# Patient Record
Sex: MALE | Race: WHITE | NOT HISPANIC OR LATINO | Employment: FULL TIME | URBAN - METROPOLITAN AREA
[De-identification: names, ages, dates, MRNs, and addresses within clinical notes are randomized per-mention and may not be internally consistent; named-entity substitution may affect disease eponyms.]

---

## 2017-11-18 ENCOUNTER — ANESTHESIA - HEALTHEAST (OUTPATIENT)
Dept: SURGERY | Facility: CLINIC | Age: 22
End: 2017-11-18

## 2017-11-18 ENCOUNTER — SURGERY - HEALTHEAST (OUTPATIENT)
Dept: SURGERY | Facility: CLINIC | Age: 22
End: 2017-11-18

## 2017-11-18 ASSESSMENT — MIFFLIN-ST. JEOR: SCORE: 1680.26

## 2021-05-28 ENCOUNTER — RECORDS - HEALTHEAST (OUTPATIENT)
Dept: ADMINISTRATIVE | Facility: CLINIC | Age: 26
End: 2021-05-28

## 2021-05-29 ENCOUNTER — RECORDS - HEALTHEAST (OUTPATIENT)
Dept: ADMINISTRATIVE | Facility: CLINIC | Age: 26
End: 2021-05-29

## 2021-05-30 ENCOUNTER — RECORDS - HEALTHEAST (OUTPATIENT)
Dept: ADMINISTRATIVE | Facility: CLINIC | Age: 26
End: 2021-05-30

## 2021-05-31 ENCOUNTER — RECORDS - HEALTHEAST (OUTPATIENT)
Dept: ADMINISTRATIVE | Facility: CLINIC | Age: 26
End: 2021-05-31

## 2021-05-31 VITALS — WEIGHT: 163.5 LBS | HEIGHT: 67 IN | BODY MASS INDEX: 25.66 KG/M2

## 2021-06-14 NOTE — ANESTHESIA PREPROCEDURE EVALUATION
Anesthesia Evaluation      Patient summary reviewed   No history of anesthetic complications     Airway   Mallampati: II  Neck ROM: full   Pulmonary - normal exam    breath sounds clear to auscultation  (+) a smoker                         Cardiovascular - normal exam  Exercise tolerance: > or = 4 METS  Rhythm: regular  Rate: normal,         Neuro/Psych - negative ROS     Endo/Other - negative ROS      GI/Hepatic/Renal - negative ROS           Dental    (+) poor dentition and chipped                       Anesthesia Plan  Planned anesthetic: general endotracheal  -- RSI with Succ  -- PONV prophylaxis with Decadron 10 mg and Zofran 4 mg  -- Equal volume reversal of relaxant (esmolol for iatrogenic tachycardia)    ASA 1 - emergent   Induction: intravenous   Anesthetic plan and risks discussed with: patient  Anesthesia plan special considerations: rapid sequence induction, antiemetics,   Post-op plan: routine recovery

## 2021-06-14 NOTE — ANESTHESIA CARE TRANSFER NOTE
Last vitals:   Vitals:    11/18/17 1236   BP: 166/81   Pulse: 90   Resp: 12   Temp: 36.3  C (97.3  F)   SpO2: 100%     Patient's level of consciousness is awake  Spontaneous respirations: yes  Maintains airway independently: yes  Dentition unchanged: yes  Oropharynx: oropharynx clear of all foreign objects    QCDR Measures:  ASA# 20 - Surgical Safety Checklist: WHO surgical safety checklist completed prior to induction  PQRS# 430 - Adult PONV Prevention: 4558F - Pt received => 2 anti-emetic agents (different classes) preop & intraop  ASA# 8 - Peds PONV Prevention: NA - Not pediatric patient, not GA or 2 or more risk factors NOT present  PQRS# 424 - Shira-op Temp Management: 4559F - At least one body temp DOCUMENTED => 35.5C or 95.9F within required timeframe  PQRS# 426 - PACU Transfer Protocol: - Transfer of care checklist used  ASA# 14 - Acute Post-op Pain: ASA14B - Patient did NOT experience pain >= 7 out of 10

## 2021-06-14 NOTE — ANESTHESIA POSTPROCEDURE EVALUATION
Patient: Duane Ramos  APPENDECTOMY, LAPAROSCOPIC  Anesthesia type: general    Patient location: floor  Last vitals:   Vitals:    11/18/17 1400   BP: 136/74   Pulse: 77   Resp: 16   Temp: 37  C (98.6  F)   SpO2: 97%     Post vital signs: stable  Level of consciousness: awake and responds to simple questions  Post-anesthesia pain: pain controlled  Post-anesthesia nausea and vomiting: no  Pulmonary: unassisted, return to baseline  Cardiovascular: stable and blood pressure at baseline  Hydration: adequate  Anesthetic events: no    QCDR Measures:  ASA# 11 - Shira-op Cardiac Arrest: ASA11B - Patient did NOT experience unanticipated cardiac arrest  ASA# 12 - Shira-op Mortality Rate: ASA12B - Patient did NOT die  ASA# 13 - PACU Re-Intubation Rate: ASA13B - Patient did NOT require a new airway mgmt  ASA# 10 - Composite Anes Safety: ASA10A - No serious adverse event    Additional Notes:

## 2021-06-16 PROBLEM — R10.31 RLQ ABDOMINAL PAIN: Status: ACTIVE | Noted: 2017-11-18

## 2024-02-11 ENCOUNTER — APPOINTMENT (OUTPATIENT)
Dept: CT IMAGING | Facility: CLINIC | Age: 29
DRG: 439 | End: 2024-02-11
Attending: FAMILY MEDICINE
Payer: COMMERCIAL

## 2024-02-11 ENCOUNTER — HOSPITAL ENCOUNTER (INPATIENT)
Facility: CLINIC | Age: 29
LOS: 3 days | Discharge: HOME | DRG: 439 | End: 2024-02-14
Attending: FAMILY MEDICINE | Admitting: STUDENT IN AN ORGANIZED HEALTH CARE EDUCATION/TRAINING PROGRAM
Payer: COMMERCIAL

## 2024-02-11 DIAGNOSIS — K85.20 ALCOHOL-INDUCED ACUTE PANCREATITIS WITHOUT INFECTION OR NECROSIS: ICD-10-CM

## 2024-02-11 LAB
ALBUMIN SERPL BCG-MCNC: 5.1 G/DL (ref 3.5–5.2)
ALBUMIN UR-MCNC: NEGATIVE MG/DL
ALP SERPL-CCNC: 67 U/L (ref 40–150)
ALT SERPL W P-5'-P-CCNC: 168 U/L (ref 0–70)
ANION GAP SERPL CALCULATED.3IONS-SCNC: 21 MMOL/L (ref 7–15)
APPEARANCE UR: CLEAR
AST SERPL W P-5'-P-CCNC: 85 U/L (ref 0–45)
BASOPHILS # BLD AUTO: 0.1 10E3/UL (ref 0–0.2)
BASOPHILS NFR BLD AUTO: 0 %
BILIRUB SERPL-MCNC: 0.3 MG/DL
BILIRUB UR QL STRIP: NEGATIVE
BUN SERPL-MCNC: 17.5 MG/DL (ref 6–20)
CALCIUM SERPL-MCNC: 9.6 MG/DL (ref 8.6–10)
CHLORIDE SERPL-SCNC: 101 MMOL/L (ref 98–107)
COLOR UR AUTO: YELLOW
CREAT SERPL-MCNC: 1.06 MG/DL (ref 0.67–1.17)
DEPRECATED HCO3 PLAS-SCNC: 22 MMOL/L (ref 22–29)
EGFRCR SERPLBLD CKD-EPI 2021: >90 ML/MIN/1.73M2
EOSINOPHIL # BLD AUTO: 0.1 10E3/UL (ref 0–0.7)
EOSINOPHIL NFR BLD AUTO: 0 %
ERYTHROCYTE [DISTWIDTH] IN BLOOD BY AUTOMATED COUNT: 11.9 % (ref 10–15)
ETHANOL SERPL-MCNC: 0.1 G/DL
GLUCOSE SERPL-MCNC: 145 MG/DL (ref 70–99)
GLUCOSE UR STRIP-MCNC: NEGATIVE MG/DL
HCT VFR BLD AUTO: 48.9 % (ref 40–53)
HGB BLD-MCNC: 16.7 G/DL (ref 13.3–17.7)
HGB UR QL STRIP: NEGATIVE
HOLD SPECIMEN: NORMAL
IMM GRANULOCYTES # BLD: 0.2 10E3/UL
IMM GRANULOCYTES NFR BLD: 1 %
KETONES UR STRIP-MCNC: 5 MG/DL
LEUKOCYTE ESTERASE UR QL STRIP: NEGATIVE
LIPASE SERPL-CCNC: >3000 U/L (ref 13–60)
LYMPHOCYTES # BLD AUTO: 4.9 10E3/UL (ref 0.8–5.3)
LYMPHOCYTES NFR BLD AUTO: 20 %
MAGNESIUM SERPL-MCNC: 2.2 MG/DL (ref 1.7–2.3)
MCH RBC QN AUTO: 30.1 PG (ref 26.5–33)
MCHC RBC AUTO-ENTMCNC: 34.2 G/DL (ref 31.5–36.5)
MCV RBC AUTO: 88 FL (ref 78–100)
MONOCYTES # BLD AUTO: 1.2 10E3/UL (ref 0–1.3)
MONOCYTES NFR BLD AUTO: 5 %
NEUTROPHILS # BLD AUTO: 18.5 10E3/UL (ref 1.6–8.3)
NEUTROPHILS NFR BLD AUTO: 74 %
NITRATE UR QL: NEGATIVE
NRBC # BLD AUTO: 0 10E3/UL
NRBC BLD AUTO-RTO: 0 /100
PH UR STRIP: 5 [PH] (ref 5–7)
PHOSPHATE SERPL-MCNC: 4.3 MG/DL (ref 2.5–4.5)
PLATELET # BLD AUTO: 310 10E3/UL (ref 150–450)
POTASSIUM SERPL-SCNC: 3.6 MMOL/L (ref 3.4–5.3)
PROT SERPL-MCNC: 8.6 G/DL (ref 6.4–8.3)
RBC # BLD AUTO: 5.54 10E6/UL (ref 4.4–5.9)
SODIUM SERPL-SCNC: 144 MMOL/L (ref 135–145)
SP GR UR STRIP: 1.01 (ref 1–1.03)
TROPONIN T SERPL HS-MCNC: <6 NG/L
UROBILINOGEN UR STRIP-MCNC: NORMAL MG/DL
WBC # BLD AUTO: 24.9 10E3/UL (ref 4–11)

## 2024-02-11 PROCEDURE — 99221 1ST HOSP IP/OBS SF/LOW 40: CPT

## 2024-02-11 PROCEDURE — 250N000011 HC RX IP 250 OP 636

## 2024-02-11 PROCEDURE — 96375 TX/PRO/DX INJ NEW DRUG ADDON: CPT | Performed by: FAMILY MEDICINE

## 2024-02-11 PROCEDURE — C9113 INJ PANTOPRAZOLE SODIUM, VIA: HCPCS | Performed by: FAMILY MEDICINE

## 2024-02-11 PROCEDURE — 84100 ASSAY OF PHOSPHORUS: CPT

## 2024-02-11 PROCEDURE — 83735 ASSAY OF MAGNESIUM: CPT

## 2024-02-11 PROCEDURE — 250N000013 HC RX MED GY IP 250 OP 250 PS 637

## 2024-02-11 PROCEDURE — 83690 ASSAY OF LIPASE: CPT | Performed by: FAMILY MEDICINE

## 2024-02-11 PROCEDURE — 81003 URINALYSIS AUTO W/O SCOPE: CPT | Performed by: FAMILY MEDICINE

## 2024-02-11 PROCEDURE — 84484 ASSAY OF TROPONIN QUANT: CPT | Performed by: FAMILY MEDICINE

## 2024-02-11 PROCEDURE — 99285 EMERGENCY DEPT VISIT HI MDM: CPT | Mod: 25 | Performed by: FAMILY MEDICINE

## 2024-02-11 PROCEDURE — 96361 HYDRATE IV INFUSION ADD-ON: CPT | Performed by: FAMILY MEDICINE

## 2024-02-11 PROCEDURE — 258N000003 HC RX IP 258 OP 636: Performed by: FAMILY MEDICINE

## 2024-02-11 PROCEDURE — 96374 THER/PROPH/DIAG INJ IV PUSH: CPT | Mod: 59 | Performed by: FAMILY MEDICINE

## 2024-02-11 PROCEDURE — 250N000009 HC RX 250: Performed by: FAMILY MEDICINE

## 2024-02-11 PROCEDURE — 93010 ELECTROCARDIOGRAM REPORT: CPT | Performed by: FAMILY MEDICINE

## 2024-02-11 PROCEDURE — 250N000011 HC RX IP 250 OP 636: Performed by: FAMILY MEDICINE

## 2024-02-11 PROCEDURE — 85004 AUTOMATED DIFF WBC COUNT: CPT | Performed by: EMERGENCY MEDICINE

## 2024-02-11 PROCEDURE — 258N000003 HC RX IP 258 OP 636

## 2024-02-11 PROCEDURE — 85025 COMPLETE CBC W/AUTO DIFF WBC: CPT | Performed by: FAMILY MEDICINE

## 2024-02-11 PROCEDURE — 250N000009 HC RX 250

## 2024-02-11 PROCEDURE — 250N000011 HC RX IP 250 OP 636: Performed by: EMERGENCY MEDICINE

## 2024-02-11 PROCEDURE — 120N000001 HC R&B MED SURG/OB

## 2024-02-11 PROCEDURE — 80053 COMPREHEN METABOLIC PANEL: CPT | Performed by: FAMILY MEDICINE

## 2024-02-11 PROCEDURE — 36415 COLL VENOUS BLD VENIPUNCTURE: CPT | Performed by: EMERGENCY MEDICINE

## 2024-02-11 PROCEDURE — 96376 TX/PRO/DX INJ SAME DRUG ADON: CPT | Performed by: FAMILY MEDICINE

## 2024-02-11 PROCEDURE — 82077 ASSAY SPEC XCP UR&BREATH IA: CPT | Performed by: FAMILY MEDICINE

## 2024-02-11 PROCEDURE — 74177 CT ABD & PELVIS W/CONTRAST: CPT

## 2024-02-11 RX ORDER — HALOPERIDOL 5 MG/ML
2.5-5 INJECTION INTRAMUSCULAR EVERY 6 HOURS PRN
Status: DISCONTINUED | OUTPATIENT
Start: 2024-02-11 | End: 2024-02-14 | Stop reason: HOSPADM

## 2024-02-11 RX ORDER — OXYCODONE HYDROCHLORIDE 5 MG/1
5 TABLET ORAL EVERY 4 HOURS PRN
Status: DISCONTINUED | OUTPATIENT
Start: 2024-02-11 | End: 2024-02-14 | Stop reason: HOSPADM

## 2024-02-11 RX ORDER — DIAZEPAM 5 MG
10 TABLET ORAL EVERY 30 MIN PRN
Status: DISCONTINUED | OUTPATIENT
Start: 2024-02-11 | End: 2024-02-14 | Stop reason: HOSPADM

## 2024-02-11 RX ORDER — NALOXONE HYDROCHLORIDE 0.4 MG/ML
0.4 INJECTION, SOLUTION INTRAMUSCULAR; INTRAVENOUS; SUBCUTANEOUS
Status: DISCONTINUED | OUTPATIENT
Start: 2024-02-11 | End: 2024-02-14 | Stop reason: HOSPADM

## 2024-02-11 RX ORDER — GABAPENTIN 300 MG/1
900 CAPSULE ORAL EVERY 8 HOURS
Qty: 27 CAPSULE | Refills: 0 | Status: DISCONTINUED | OUTPATIENT
Start: 2024-02-12 | End: 2024-02-14 | Stop reason: HOSPADM

## 2024-02-11 RX ORDER — ONDANSETRON 2 MG/ML
4 INJECTION INTRAMUSCULAR; INTRAVENOUS EVERY 6 HOURS PRN
Status: DISCONTINUED | OUTPATIENT
Start: 2024-02-11 | End: 2024-02-14 | Stop reason: HOSPADM

## 2024-02-11 RX ORDER — ONDANSETRON 4 MG/1
4 TABLET, ORALLY DISINTEGRATING ORAL EVERY 6 HOURS PRN
Status: DISCONTINUED | OUTPATIENT
Start: 2024-02-11 | End: 2024-02-14 | Stop reason: HOSPADM

## 2024-02-11 RX ORDER — ONDANSETRON 2 MG/ML
4 INJECTION INTRAMUSCULAR; INTRAVENOUS EVERY 6 HOURS PRN
Status: DISCONTINUED | OUTPATIENT
Start: 2024-02-11 | End: 2024-02-11

## 2024-02-11 RX ORDER — FOLIC ACID 1 MG/1
1 TABLET ORAL DAILY
Status: DISCONTINUED | OUTPATIENT
Start: 2024-02-12 | End: 2024-02-14 | Stop reason: HOSPADM

## 2024-02-11 RX ORDER — HYDROMORPHONE HYDROCHLORIDE 1 MG/ML
.3-.5 INJECTION, SOLUTION INTRAMUSCULAR; INTRAVENOUS; SUBCUTANEOUS
Status: DISCONTINUED | OUTPATIENT
Start: 2024-02-11 | End: 2024-02-11

## 2024-02-11 RX ORDER — HYDROMORPHONE HYDROCHLORIDE 1 MG/ML
0.5 INJECTION, SOLUTION INTRAMUSCULAR; INTRAVENOUS; SUBCUTANEOUS
Status: COMPLETED | OUTPATIENT
Start: 2024-02-11 | End: 2024-02-11

## 2024-02-11 RX ORDER — SODIUM CHLORIDE 9 MG/ML
INJECTION, SOLUTION INTRAVENOUS CONTINUOUS
Status: DISCONTINUED | OUTPATIENT
Start: 2024-02-11 | End: 2024-02-11 | Stop reason: ALTCHOICE

## 2024-02-11 RX ORDER — PROCHLORPERAZINE 25 MG
25 SUPPOSITORY, RECTAL RECTAL EVERY 12 HOURS PRN
Status: DISCONTINUED | OUTPATIENT
Start: 2024-02-11 | End: 2024-02-14 | Stop reason: HOSPADM

## 2024-02-11 RX ORDER — CALCIUM CARBONATE 500 MG/1
1000 TABLET, CHEWABLE ORAL 4 TIMES DAILY PRN
Status: DISCONTINUED | OUTPATIENT
Start: 2024-02-11 | End: 2024-02-11

## 2024-02-11 RX ORDER — ACETAMINOPHEN 325 MG/1
650 TABLET ORAL EVERY 4 HOURS PRN
Status: DISCONTINUED | OUTPATIENT
Start: 2024-02-11 | End: 2024-02-14 | Stop reason: HOSPADM

## 2024-02-11 RX ORDER — HYDROMORPHONE HYDROCHLORIDE 1 MG/ML
0.3 INJECTION, SOLUTION INTRAMUSCULAR; INTRAVENOUS; SUBCUTANEOUS
Status: DISCONTINUED | OUTPATIENT
Start: 2024-02-11 | End: 2024-02-14 | Stop reason: HOSPADM

## 2024-02-11 RX ORDER — MULTIPLE VITAMINS W/ MINERALS TAB 9MG-400MCG
1 TAB ORAL DAILY
Status: DISCONTINUED | OUTPATIENT
Start: 2024-02-12 | End: 2024-02-14 | Stop reason: HOSPADM

## 2024-02-11 RX ORDER — AMOXICILLIN 250 MG
1 CAPSULE ORAL 2 TIMES DAILY PRN
Status: DISCONTINUED | OUTPATIENT
Start: 2024-02-11 | End: 2024-02-13

## 2024-02-11 RX ORDER — AMOXICILLIN 250 MG
2 CAPSULE ORAL 2 TIMES DAILY PRN
Status: DISCONTINUED | OUTPATIENT
Start: 2024-02-11 | End: 2024-02-13

## 2024-02-11 RX ORDER — OLANZAPINE 5 MG/1
5-10 TABLET, ORALLY DISINTEGRATING ORAL EVERY 6 HOURS PRN
Status: DISCONTINUED | OUTPATIENT
Start: 2024-02-11 | End: 2024-02-14 | Stop reason: HOSPADM

## 2024-02-11 RX ORDER — CLONIDINE HYDROCHLORIDE 0.1 MG/1
0.1 TABLET ORAL EVERY 8 HOURS
Status: DISCONTINUED | OUTPATIENT
Start: 2024-02-11 | End: 2024-02-14 | Stop reason: HOSPADM

## 2024-02-11 RX ORDER — LIDOCAINE 40 MG/G
CREAM TOPICAL
Status: DISCONTINUED | OUTPATIENT
Start: 2024-02-11 | End: 2024-02-14 | Stop reason: HOSPADM

## 2024-02-11 RX ORDER — DIAZEPAM 10 MG/2ML
5-10 INJECTION, SOLUTION INTRAMUSCULAR; INTRAVENOUS EVERY 30 MIN PRN
Status: DISCONTINUED | OUTPATIENT
Start: 2024-02-11 | End: 2024-02-14 | Stop reason: HOSPADM

## 2024-02-11 RX ORDER — NALOXONE HYDROCHLORIDE 0.4 MG/ML
0.2 INJECTION, SOLUTION INTRAMUSCULAR; INTRAVENOUS; SUBCUTANEOUS
Status: DISCONTINUED | OUTPATIENT
Start: 2024-02-11 | End: 2024-02-14 | Stop reason: HOSPADM

## 2024-02-11 RX ORDER — GABAPENTIN 100 MG/1
100 CAPSULE ORAL EVERY 8 HOURS
Qty: 9 CAPSULE | Refills: 0 | Status: DISCONTINUED | OUTPATIENT
Start: 2024-02-19 | End: 2024-02-14 | Stop reason: HOSPADM

## 2024-02-11 RX ORDER — GABAPENTIN 300 MG/1
600 CAPSULE ORAL EVERY 8 HOURS
Qty: 12 CAPSULE | Refills: 0 | Status: DISCONTINUED | OUTPATIENT
Start: 2024-02-15 | End: 2024-02-14 | Stop reason: HOSPADM

## 2024-02-11 RX ORDER — IOPAMIDOL 755 MG/ML
97 INJECTION, SOLUTION INTRAVASCULAR ONCE
Status: COMPLETED | OUTPATIENT
Start: 2024-02-11 | End: 2024-02-11

## 2024-02-11 RX ORDER — GABAPENTIN 600 MG/1
1200 TABLET ORAL ONCE
Status: COMPLETED | OUTPATIENT
Start: 2024-02-11 | End: 2024-02-11

## 2024-02-11 RX ORDER — ONDANSETRON 4 MG/1
4 TABLET, ORALLY DISINTEGRATING ORAL EVERY 6 HOURS PRN
Status: DISCONTINUED | OUTPATIENT
Start: 2024-02-11 | End: 2024-02-11

## 2024-02-11 RX ORDER — SODIUM CHLORIDE, SODIUM LACTATE, POTASSIUM CHLORIDE, CALCIUM CHLORIDE 600; 310; 30; 20 MG/100ML; MG/100ML; MG/100ML; MG/100ML
INJECTION, SOLUTION INTRAVENOUS CONTINUOUS
Status: DISCONTINUED | OUTPATIENT
Start: 2024-02-11 | End: 2024-02-14 | Stop reason: HOSPADM

## 2024-02-11 RX ORDER — GABAPENTIN 300 MG/1
300 CAPSULE ORAL EVERY 8 HOURS
Qty: 6 CAPSULE | Refills: 0 | Status: DISCONTINUED | OUTPATIENT
Start: 2024-02-17 | End: 2024-02-14 | Stop reason: HOSPADM

## 2024-02-11 RX ORDER — FLUMAZENIL 0.1 MG/ML
0.2 INJECTION, SOLUTION INTRAVENOUS
Status: DISCONTINUED | OUTPATIENT
Start: 2024-02-11 | End: 2024-02-14 | Stop reason: HOSPADM

## 2024-02-11 RX ORDER — OXYCODONE HYDROCHLORIDE 5 MG/1
5 TABLET ORAL EVERY 4 HOURS PRN
Status: DISCONTINUED | OUTPATIENT
Start: 2024-02-11 | End: 2024-02-11

## 2024-02-11 RX ORDER — PROCHLORPERAZINE MALEATE 10 MG
10 TABLET ORAL EVERY 6 HOURS PRN
Status: DISCONTINUED | OUTPATIENT
Start: 2024-02-11 | End: 2024-02-14 | Stop reason: HOSPADM

## 2024-02-11 RX ORDER — CALCIUM CARBONATE 500 MG/1
1000 TABLET, CHEWABLE ORAL 4 TIMES DAILY PRN
Status: DISCONTINUED | OUTPATIENT
Start: 2024-02-11 | End: 2024-02-14 | Stop reason: HOSPADM

## 2024-02-11 RX ORDER — ONDANSETRON 2 MG/ML
4 INJECTION INTRAMUSCULAR; INTRAVENOUS ONCE
Status: COMPLETED | OUTPATIENT
Start: 2024-02-11 | End: 2024-02-11

## 2024-02-11 RX ADMIN — IOPAMIDOL 97 ML: 755 INJECTION, SOLUTION INTRAVENOUS at 19:11

## 2024-02-11 RX ADMIN — HYDROMORPHONE HYDROCHLORIDE 0.3 MG: 1 INJECTION, SOLUTION INTRAMUSCULAR; INTRAVENOUS; SUBCUTANEOUS at 23:25

## 2024-02-11 RX ADMIN — GABAPENTIN 1200 MG: 600 TABLET, FILM COATED ORAL at 22:32

## 2024-02-11 RX ADMIN — HYDROMORPHONE HYDROCHLORIDE 0.5 MG: 1 INJECTION, SOLUTION INTRAMUSCULAR; INTRAVENOUS; SUBCUTANEOUS at 20:20

## 2024-02-11 RX ADMIN — ONDANSETRON 4 MG: 2 INJECTION INTRAMUSCULAR; INTRAVENOUS at 18:15

## 2024-02-11 RX ADMIN — HYDROMORPHONE HYDROCHLORIDE 0.5 MG: 1 INJECTION, SOLUTION INTRAMUSCULAR; INTRAVENOUS; SUBCUTANEOUS at 21:01

## 2024-02-11 RX ADMIN — SODIUM CHLORIDE 64 ML: 9 INJECTION, SOLUTION INTRAVENOUS at 19:11

## 2024-02-11 RX ADMIN — PANTOPRAZOLE SODIUM 40 MG: 40 INJECTION, POWDER, FOR SOLUTION INTRAVENOUS at 19:02

## 2024-02-11 RX ADMIN — ASCORBIC ACID, VITAMIN A PALMITATE, CHOLECALCIFEROL, THIAMINE HYDROCHLORIDE, RIBOFLAVIN-5 PHOSPHATE SODIUM, PYRIDOXINE HYDROCHLORIDE, NIACINAMIDE, DEXPANTHENOL, ALPHA-TOCOPHEROL ACETATE, VITAMIN K1, FOLIC ACID, BIOTIN, CYANOCOBALAMIN: 200; 3300; 200; 6; 3.6; 6; 40; 15; 10; 150; 600; 60; 5 INJECTION, SOLUTION INTRAVENOUS at 23:15

## 2024-02-11 RX ADMIN — CLONIDINE HYDROCHLORIDE 0.1 MG: 0.1 TABLET ORAL at 22:31

## 2024-02-11 RX ADMIN — ACETAMINOPHEN 650 MG: 325 TABLET, FILM COATED ORAL at 22:32

## 2024-02-11 RX ADMIN — SODIUM CHLORIDE, POTASSIUM CHLORIDE, SODIUM LACTATE AND CALCIUM CHLORIDE: 600; 310; 30; 20 INJECTION, SOLUTION INTRAVENOUS at 22:34

## 2024-02-11 RX ADMIN — OXYCODONE HYDROCHLORIDE 2.5 MG: 5 TABLET ORAL at 22:32

## 2024-02-11 RX ADMIN — ONDANSETRON 4 MG: 4 TABLET, ORALLY DISINTEGRATING ORAL at 22:31

## 2024-02-11 RX ADMIN — HYDROMORPHONE HYDROCHLORIDE 0.5 MG: 1 INJECTION, SOLUTION INTRAMUSCULAR; INTRAVENOUS; SUBCUTANEOUS at 19:02

## 2024-02-11 RX ADMIN — SODIUM CHLORIDE 1000 ML: 9 INJECTION, SOLUTION INTRAVENOUS at 19:37

## 2024-02-11 ASSESSMENT — ACTIVITIES OF DAILY LIVING (ADL)
ADLS_ACUITY_SCORE: 18
ADLS_ACUITY_SCORE: 35
ADLS_ACUITY_SCORE: 35

## 2024-02-11 NOTE — LETTER
[facility] SURGICAL  [address] [address]  [phone]      2024    Duane Ramos  [address]  [facility] [place] [address]  [phone] (home)     : 1995      To Whom it may concern:    Duane Ramos was admitted to [facility] on 2024 for a serious medical condition.  He was discharged to home with recovery instructions on 2024.  He may return to work without restriction on 2024.      Sincerely,        Leo Melendez MD

## 2024-02-11 NOTE — LETTER
Communication to Referring Provider                    Duane Ramos MRN# [id number]   YOB: 1995 Age: 28 year old     Date of Admission:  ***  Date of Discharge:  {DISCHARGE DATE:119492}  Admitting Physician:  Anatoly Malin MD  Discharging Physician: {:[id number]} (Contact: ***)  Discharging Service:  {:[id number]}  Hospitalization Status: {:[id number]}     Primary Care Clinic:  {:[id number]}  Primary Care Provider: No Ref-Primary, Physician     {   Salutation            :[id number]}            You have been identified as the Primary Care Provider for Duane Ramos, who was recently admitted to {[facility]:[id number]}.  Thank you for the referral to our hospital.  It is our goal to provide the highest quality of care for our patients, including planning for seamless continuity of care by providing you with timely, accurate and concise information.  After reviewing the following combined discharge summary and final progress note, please contact us if you have any remaining questions.  The Discharging Physician will be the best informed, with their contact information listed above.  If unable to reach them, or if you have received this letter in error, please call *** and someone will try to help you.

## 2024-02-12 LAB
ALBUMIN SERPL BCG-MCNC: 4.6 G/DL (ref 3.5–5.2)
ALP SERPL-CCNC: 55 U/L (ref 40–150)
ALT SERPL W P-5'-P-CCNC: 123 U/L (ref 0–70)
ANION GAP SERPL CALCULATED.3IONS-SCNC: 18 MMOL/L (ref 7–15)
AST SERPL W P-5'-P-CCNC: 56 U/L (ref 0–45)
BASOPHILS # BLD AUTO: 0 10E3/UL (ref 0–0.2)
BASOPHILS NFR BLD AUTO: 0 %
BILIRUB SERPL-MCNC: 0.6 MG/DL
BUN SERPL-MCNC: 19.4 MG/DL (ref 6–20)
CALCIUM SERPL-MCNC: 8.7 MG/DL (ref 8.6–10)
CHLORIDE SERPL-SCNC: 99 MMOL/L (ref 98–107)
CREAT SERPL-MCNC: 0.89 MG/DL (ref 0.67–1.17)
DEPRECATED HCO3 PLAS-SCNC: 21 MMOL/L (ref 22–29)
EGFRCR SERPLBLD CKD-EPI 2021: >90 ML/MIN/1.73M2
EOSINOPHIL # BLD AUTO: 0 10E3/UL (ref 0–0.7)
EOSINOPHIL NFR BLD AUTO: 0 %
ERYTHROCYTE [DISTWIDTH] IN BLOOD BY AUTOMATED COUNT: 11.9 % (ref 10–15)
GLUCOSE SERPL-MCNC: 200 MG/DL (ref 70–99)
HCT VFR BLD AUTO: 48.6 % (ref 40–53)
HGB BLD-MCNC: 16.7 G/DL (ref 13.3–17.7)
IMM GRANULOCYTES # BLD: 0.2 10E3/UL
IMM GRANULOCYTES NFR BLD: 1 %
LYMPHOCYTES # BLD AUTO: 0.9 10E3/UL (ref 0.8–5.3)
LYMPHOCYTES NFR BLD AUTO: 5 %
MCH RBC QN AUTO: 30.1 PG (ref 26.5–33)
MCHC RBC AUTO-ENTMCNC: 34.4 G/DL (ref 31.5–36.5)
MCV RBC AUTO: 88 FL (ref 78–100)
MONOCYTES # BLD AUTO: 1 10E3/UL (ref 0–1.3)
MONOCYTES NFR BLD AUTO: 6 %
NEUTROPHILS # BLD AUTO: 15.7 10E3/UL (ref 1.6–8.3)
NEUTROPHILS NFR BLD AUTO: 88 %
NRBC # BLD AUTO: 0 10E3/UL
NRBC BLD AUTO-RTO: 0 /100
PLATELET # BLD AUTO: 253 10E3/UL (ref 150–450)
POTASSIUM SERPL-SCNC: 4.8 MMOL/L (ref 3.4–5.3)
PROT SERPL-MCNC: 7.5 G/DL (ref 6.4–8.3)
RBC # BLD AUTO: 5.54 10E6/UL (ref 4.4–5.9)
SODIUM SERPL-SCNC: 138 MMOL/L (ref 135–145)
WBC # BLD AUTO: 17.8 10E3/UL (ref 4–11)

## 2024-02-12 PROCEDURE — 80053 COMPREHEN METABOLIC PANEL: CPT

## 2024-02-12 PROCEDURE — 250N000011 HC RX IP 250 OP 636

## 2024-02-12 PROCEDURE — 258N000003 HC RX IP 258 OP 636

## 2024-02-12 PROCEDURE — 250N000011 HC RX IP 250 OP 636: Performed by: FAMILY MEDICINE

## 2024-02-12 PROCEDURE — 250N000013 HC RX MED GY IP 250 OP 250 PS 637

## 2024-02-12 PROCEDURE — 120N000001 HC R&B MED SURG/OB

## 2024-02-12 PROCEDURE — 250N000013 HC RX MED GY IP 250 OP 250 PS 637: Performed by: INTERNAL MEDICINE

## 2024-02-12 PROCEDURE — 99232 SBSQ HOSP IP/OBS MODERATE 35: CPT | Performed by: INTERNAL MEDICINE

## 2024-02-12 PROCEDURE — 36415 COLL VENOUS BLD VENIPUNCTURE: CPT

## 2024-02-12 PROCEDURE — 85025 COMPLETE CBC W/AUTO DIFF WBC: CPT

## 2024-02-12 RX ORDER — BISACODYL 5 MG
10 TABLET, DELAYED RELEASE (ENTERIC COATED) ORAL ONCE
Status: COMPLETED | OUTPATIENT
Start: 2024-02-12 | End: 2024-02-12

## 2024-02-12 RX ADMIN — OXYCODONE HYDROCHLORIDE 5 MG: 5 TABLET ORAL at 22:20

## 2024-02-12 RX ADMIN — CLONIDINE HYDROCHLORIDE 0.1 MG: 0.1 TABLET ORAL at 05:40

## 2024-02-12 RX ADMIN — SODIUM CHLORIDE, POTASSIUM CHLORIDE, SODIUM LACTATE AND CALCIUM CHLORIDE: 600; 310; 30; 20 INJECTION, SOLUTION INTRAVENOUS at 22:22

## 2024-02-12 RX ADMIN — SODIUM CHLORIDE, POTASSIUM CHLORIDE, SODIUM LACTATE AND CALCIUM CHLORIDE: 600; 310; 30; 20 INJECTION, SOLUTION INTRAVENOUS at 09:31

## 2024-02-12 RX ADMIN — HYDROMORPHONE HYDROCHLORIDE 0.3 MG: 1 INJECTION, SOLUTION INTRAMUSCULAR; INTRAVENOUS; SUBCUTANEOUS at 04:21

## 2024-02-12 RX ADMIN — THIAMINE HCL TAB 100 MG 100 MG: 100 TAB at 07:55

## 2024-02-12 RX ADMIN — BISACODYL 10 MG: 5 TABLET, COATED ORAL at 13:19

## 2024-02-12 RX ADMIN — CLONIDINE HYDROCHLORIDE 0.1 MG: 0.1 TABLET ORAL at 15:01

## 2024-02-12 RX ADMIN — GABAPENTIN 900 MG: 300 CAPSULE ORAL at 15:00

## 2024-02-12 RX ADMIN — HYDROMORPHONE HYDROCHLORIDE 0.3 MG: 1 INJECTION, SOLUTION INTRAMUSCULAR; INTRAVENOUS; SUBCUTANEOUS at 01:28

## 2024-02-12 RX ADMIN — SODIUM CHLORIDE, POTASSIUM CHLORIDE, SODIUM LACTATE AND CALCIUM CHLORIDE: 600; 310; 30; 20 INJECTION, SOLUTION INTRAVENOUS at 16:23

## 2024-02-12 RX ADMIN — ACETAMINOPHEN 650 MG: 325 TABLET, FILM COATED ORAL at 07:54

## 2024-02-12 RX ADMIN — FOLIC ACID 1 MG: 1 TABLET ORAL at 07:55

## 2024-02-12 RX ADMIN — GABAPENTIN 900 MG: 300 CAPSULE ORAL at 05:41

## 2024-02-12 RX ADMIN — OXYCODONE HYDROCHLORIDE 5 MG: 5 TABLET ORAL at 17:28

## 2024-02-12 RX ADMIN — HYDROMORPHONE HYDROCHLORIDE 0.3 MG: 1 INJECTION, SOLUTION INTRAMUSCULAR; INTRAVENOUS; SUBCUTANEOUS at 06:29

## 2024-02-12 RX ADMIN — GABAPENTIN 900 MG: 300 CAPSULE ORAL at 22:12

## 2024-02-12 RX ADMIN — OXYCODONE HYDROCHLORIDE 5 MG: 5 TABLET ORAL at 09:30

## 2024-02-12 RX ADMIN — ONDANSETRON 4 MG: 2 INJECTION INTRAMUSCULAR; INTRAVENOUS at 06:22

## 2024-02-12 RX ADMIN — SENNOSIDES AND DOCUSATE SODIUM 2 TABLET: 8.6; 5 TABLET ORAL at 11:54

## 2024-02-12 RX ADMIN — SENNOSIDES AND DOCUSATE SODIUM 1 TABLET: 8.6; 5 TABLET ORAL at 22:13

## 2024-02-12 RX ADMIN — CLONIDINE HYDROCHLORIDE 0.1 MG: 0.1 TABLET ORAL at 22:13

## 2024-02-12 RX ADMIN — ACETAMINOPHEN 650 MG: 325 TABLET, FILM COATED ORAL at 15:01

## 2024-02-12 RX ADMIN — CALCIUM CARBONATE (ANTACID) CHEW TAB 500 MG 1000 MG: 500 CHEW TAB at 22:20

## 2024-02-12 RX ADMIN — Medication 1 TABLET: at 07:55

## 2024-02-12 ASSESSMENT — ACTIVITIES OF DAILY LIVING (ADL)
ADLS_ACUITY_SCORE: 18

## 2024-02-12 NOTE — ED TRIAGE NOTES
Pt had sudden onset of abd pain this evening.  Pt states that it feels different than the 24 hr bug he had 2 weeks ago.  Sharp pain in upper abd.  RN notes patient to be pale in triage.  Pain 10/10.  Nausea and vomiting.      RN line and lab in triage while awaiting a room for patient.  Zofran administered for comfort.      Triage Assessment (Adult)       Row Name 02/11/24 1800          Triage Assessment    Airway WDL WDL        Respiratory WDL    Respiratory WDL WDL        Skin Circulation/Temperature WDL    Skin Circulation/Temperature WDL temperature;all     Skin Temperature cool        Cardiac WDL    Cardiac WDL WDL        Peripheral/Neurovascular WDL    Peripheral Neurovascular WDL WDL        Cognitive/Neuro/Behavioral WDL    Cognitive/Neuro/Behavioral WDL WDL

## 2024-02-12 NOTE — ED PROVIDER NOTES
History     Chief Complaint   Patient presents with    Abdominal Pain     Pt had sudden onset of abd pain this evening.  Pt states that it feels different than the 24 hr bug he had 2 weeks ago.  Sharp pain in upper abd.  RN notes patient to be pale in triage.  Pain 10/10.  Nausea and vomiting.       HPI  Duane Ramos is a 28 year old male, past medical history significant for daily alcohol use, presents to the emergency department accompanied by significant other with onset of severe mid abdominal pain around 3:00 this afternoon.  History is obtained from both the patient and his significant other.  She states that they were up late and drinking last night and so he slept until about noon.  Did not eat anything since last night but probably consumed a significant quantity of alcohol which he does on a regular basis.  The pain is sharp and severe central, worse all over when he moves.  He has not tried anything for pain.  He has never had pain like this before.  He is status post appendectomy.  He denies any other Co. Intoxicants.  He notes no fever chills or sweats.  Last normal bowel movement was yesterday, usually has a bowel movement by this time today but has not.  He is nauseated and had 4 episodes of nonblack/bloody or coffee-ground appearing emesis at home.  No history for black or tarry appearing stools.      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    Past Surgical History:    Past Surgical History:   Procedure Laterality Date    OTHER SURGICAL HISTORY Right 2008    broken clavicle    MD LAP,APPENDECTOMY N/A 11/18/2017    Procedure: APPENDECTOMY, LAPAROSCOPIC;  Surgeon: Milton Francois MD;  Location: [facility] OR;   "Service: General       Family History:    No family history on file.    Social History:  Marital Status:  Single [1]  Social History     Tobacco Use    Smoking status: Every Day    Smokeless tobacco: Never   Substance Use Topics    Alcohol use: Yes    Drug use: No        Medications:    No current outpatient medications on file.        Review of Systems   All other systems reviewed and are negative.      Physical Exam   BP: 124/68  Pulse: 80  Resp: 16  Height: 170.2 cm (5' 7\")  Weight: 90.7 kg (200 lb)  SpO2: 95 %      Physical Exam  Vitals and nursing note reviewed.   Constitutional:       General: He is in acute distress.      Appearance: He is well-developed and normal weight. He is ill-appearing.      Comments: Pale, diaphoretic, appears very uncomfortable due to pain   HENT:      Head: Normocephalic and atraumatic.      Mouth/Throat:      Mouth: Mucous membranes are moist.      Pharynx: Oropharynx is clear.   Eyes:      Extraocular Movements: Extraocular movements intact.      Pupils: Pupils are equal, round, and reactive to light.   Cardiovascular:      Rate and Rhythm: Normal rate and regular rhythm.      Heart sounds: Normal heart sounds.   Pulmonary:      Effort: Pulmonary effort is normal.      Breath sounds: Normal breath sounds.   Abdominal:      Comments: Obese abdomen, diminished bowel sounds, tender left upper quadrant and epigastric area primarily but guarding throughout the abdomen and also tender bilateral CVAs.  Rebound and referred pain are present.   Skin:     General: Skin is warm and dry.      Capillary Refill: Capillary refill takes less than 2 seconds.   Neurological:      General: No focal deficit present.      Mental Status: He is alert.   Psychiatric:         Mood and Affect: Mood normal.         Behavior: Behavior normal.         ED Course                 Procedures              EKG Interpretation:      Interpreted by Daren Irwin MD  Time reviewed: Time obtained 741 time " interpreted same 81 bpm sinus rhythm no acute ST-T wave changes.  Impression normal EKG             Results for orders placed or performed during the hospital encounter of 02/11/24 (from the past 24 hour(s))   [place] Draw    Narrative    The following orders were created for panel order [place] Draw.  Procedure                               Abnormality         Status                     ---------                               -----------         ------                     Extra Blue Top Tube[[id number]]                              Final result               Extra Red Top Tube[[id number]]                               Final result               Extra Green Top (Lithium...[[id number]]                      Final result               Extra Purple Top Tube[[id number]]                            Final result                 Please view results for these tests on the individual orders.   Extra Blue Top Tube   Result Value Ref Range    Hold Specimen JIC    Extra Red Top Tube   Result Value Ref Range    Hold Specimen JIC    Extra Green Top (Lithium Heparin) Tube   Result Value Ref Range    Hold Specimen JIC    Extra Purple Top Tube   Result Value Ref Range    Hold Specimen JIC    CBC with platelets, differential    Narrative    The following orders were created for panel order CBC with platelets, differential.  Procedure                               Abnormality         Status                     ---------                               -----------         ------                     CBC with platelets and d...[[id number]]  Abnormal            Final result                 Please view results for these tests on the individual orders.   Comprehensive metabolic panel   Result Value Ref Range    Sodium 144 135 - 145 mmol/L    Potassium 3.6 3.4 - 5.3 mmol/L    Carbon Dioxide (CO2) 22 22 - 29 mmol/L    Anion Gap 21 (H) 7 - 15 mmol/L    Urea Nitrogen 17.5 6.0 - 20.0 mg/dL    Creatinine 1.06 0.67 - 1.17 mg/dL    GFR Estimate >90 >60  mL/min/1.73m2    Calcium 9.6 8.6 - 10.0 mg/dL    Chloride 101 98 - 107 mmol/L    Glucose 145 (H) 70 - 99 mg/dL    Alkaline Phosphatase 67 40 - 150 U/L    AST 85 (H) 0 - 45 U/L     (H) 0 - 70 U/L    Protein Total 8.6 (H) 6.4 - 8.3 g/dL    Albumin 5.1 3.5 - 5.2 g/dL    Bilirubin Total 0.3 <=1.2 mg/dL   Lipase   Result Value Ref Range    Lipase >3,000 (H) 13 - 60 U/L   CBC with platelets and differential   Result Value Ref Range    WBC Count 24.9 (H) 4.0 - 11.0 10e3/uL    RBC Count 5.54 4.40 - 5.90 10e6/uL    Hemoglobin 16.7 13.3 - 17.7 g/dL    Hematocrit 48.9 40.0 - 53.0 %    MCV 88 78 - 100 fL    MCH 30.1 26.5 - 33.0 pg    MCHC 34.2 31.5 - 36.5 g/dL    RDW 11.9 10.0 - 15.0 %    Platelet Count 310 150 - 450 10e3/uL    % Neutrophils 74 %    % Lymphocytes 20 %    % Monocytes 5 %    % Eosinophils 0 %    % Basophils 0 %    % Immature Granulocytes 1 %    NRBCs per 100 WBC 0 <1 /100    Absolute Neutrophils 18.5 (H) 1.6 - 8.3 10e3/uL    Absolute Lymphocytes 4.9 0.8 - 5.3 10e3/uL    Absolute Monocytes 1.2 0.0 - 1.3 10e3/uL    Absolute Eosinophils 0.1 0.0 - 0.7 10e3/uL    Absolute Basophils 0.1 0.0 - 0.2 10e3/uL    Absolute Immature Granulocytes 0.2 <=0.4 10e3/uL    Absolute NRBCs 0.0 10e3/uL   Troponin T, High Sensitivity   Result Value Ref Range    Troponin T, High Sensitivity <6 <=22 ng/L   Alcohol level blood   Result Value Ref Range    Alcohol ethyl 0.10 (H) <=0.01 g/dL   CT Abdomen Pelvis w Contrast    Narrative    EXAM: CT ABDOMEN PELVIS W CONTRAST  LOCATION: [facility]  DATE: 2/11/2024    INDICATION: Severe abdominal pain, peritonitis on exam, significant leukocytosis  COMPARISON: 11/17/2017  TECHNIQUE: CT scan of the abdomen and pelvis was performed following injection of IV contrast. Multiplanar reformats were obtained. Dose reduction techniques were used.  CONTRAST: 97mL isovue 370    FINDINGS:   LOWER CHEST: Normal.    HEPATOBILIARY: Diffuse hepatic steatosis. No radiopaque  gallstones.    PANCREAS: Mild pancreatic enlargement with surrounding inflammatory/edematous changes. These extend inferiorly along the anterior pararenal spaces and into the transverse mesial colon. No contained areas of fluid accumulation with peripheral enhancement   to suggest acute pancreatic fluid collections. Preserved glandular enhancement. No evidence for necrosis.    SPLEEN: Normal.    ADRENAL GLANDS: Normal.    KIDNEYS/BLADDER: 5 mm calculus within the interpolar region of the right kidney. 3 mm calculus within the upper pole of the right kidney. No hydronephrosis.    BOWEL: Normal caliber small bowel. Postoperative changes within the cecum. Normal caliber colon.    LYMPH NODES: Ill-defined celiac axis lymph nodes which measure less than 1 cm in diameter. These are most likely reactive.    VASCULATURE: No abdominal aortic aneurysm. Normal portal venous and splenic venous enhancement. Normal hepatic venous enhancement.    PELVIC ORGANS: Small amount of free fluid within the pelvis. Partially visualized small bilateral hydroceles.    MUSCULOSKELETAL: Normal.      Impression    IMPRESSION:   1.  Imaging features consistent with acute interstitial pancreatitis. No evidence for acute pancreatic fluid collections. No necrosis.  2.  Hepatic steatosis.  3.  Small, nonobstructing right renal calculi.       Medications   HYDROmorphone (PF) (DILAUDID) injection 0.5 mg (0.5 mg Intravenous $Given 2/11/24 2020)   ondansetron (ZOFRAN) injection 4 mg (4 mg Intravenous $Given 2/11/24 1815)   sodium chloride 0.9% BOLUS 1,000 mL (1,000 mLs Intravenous $New Bag 2/11/24 1937)   pantoprazole (PROTONIX) IV push injection 40 mg (40 mg Intravenous $Given 2/11/24 1902)   iopamidol (ISOVUE-370) solution 97 mL (97 mLs Intravenous $Given 2/11/24 1911)   sodium chloride 0.9 % bag 500mL for CT scan flush use (64 mLs As instructed $Given 2/11/24 1911)   8:37 PM  I reviewed imaging as well as lab diagnostics in the room with the patient  and his significant other.  This is consistent with alcoholic pancreatitis without evidence of complication on CT.  The patient's lipase is markedly elevated at greater than 3000 per lab.  I recommended admission and I discussed the patient with the hospitalist team Doroteo who agreed to admit the patient.  The patient then announced to his nurse and also to the hospitalist when he came to see him that because of insurance reasons and cost concerns he did not want to be admitted to hospital but would prefer to go home.  I revisited with the patient and his significant other in the room, explained that he can certainly make the decision to go home if he wants to, this is certainly not recommended.  I would not expect that he would be able to tolerate really much in the way of oral intake including simple fluids like water without becoming extremely uncomfortable and needing to return.  He would also run the risk of significant dehydration not being able to drink at all.  I reinforced my recommendation that he be admitted to hospital and after discussion further with he and his significant other he decided he would like to stay.  Transition orders are placed.      Assessments & Plan (with Medical Decision Making)   Assessments and plan with medical decision making at the time stamp above.    Disclaimer: This note consists of symbols derived from keyboarding, dictation and/or voice recognition software. As a result, there may be errors in the script that have gone undetected. Please consider this when interpreting information found in this chart.      I have reviewed the nursing notes.    I have reviewed the findings, diagnosis, plan and need for follow up with the patient.          New Prescriptions    No medications on file       Final diagnoses:   Alcohol-induced acute pancreatitis without infection or necrosis       2/11/2024   [facility] EMERGENCY DEPT       Daren Irwin MD  02/11/24  2040

## 2024-02-12 NOTE — PLAN OF CARE
Completed admission skin check with admitting nurse and agree with assessment.

## 2024-02-12 NOTE — PROGRESS NOTES
[place] [place]    Patient admitted to room 2407 at approximately 2210 via cart from emergency room. Patient was accompanied by nurse.     Verbal SBAR report received from Aniya prior to patient arrival.     Patient ambulated to bed independently. Patient alert and oriented X 3. Pain is not well controlled.  Medication(s) being used: narcotic analgesics including hydromorphone (Dilaudid).  . Admission vital signs: Blood pressure (!) 149/99, pulse 69, temperature 97.5  F (36.4  C), temperature source Oral, resp. rate 18, height 1.702 m (5' 7\"), weight 90.7 kg (200 lb), SpO2 94%. Patient was oriented to plan of care, call light, bed controls, tv, telephone, bathroom, and visiting hours.     Risk Assessment    The following safety risks were identified during admission: none. Yellow risk band applied: NO.     Skin Initial Assessment    This writer admitted this patient and completed a full skin assessment and Raghu score in the Adult PCS flowsheet. Appropriate interventions initiated as needed.     Secondary skin check completed by Gwendolyn ARNOLD RN.    Raghu Risk Assessment  Sensory Perception: 4-->no impairment  Moisture: 4-->rarely moist  Activity: 4-->walks frequently  Mobility: 4-->no limitation  Nutrition: 4-->excellent  Friction and Shear: 3-->no apparent problem  Raghu Score: 23  Mattress: Standard gel/foam mattress (IsoFlex, Atmos Air, etc.)  Bed Frame: Standard width and length    Education    Patient has a [employer] to Observation order: No  Observation education completed and documented: N/A    Plan  Monitor and pain management.      Kala Valdes RN      "

## 2024-02-12 NOTE — PLAN OF CARE
"Goal Outcome Evaluation:      Plan of Care Reviewed With: patient    Overall Patient Progress: no changeOverall Patient Progress: no change     Pt A&Ox4. Able to make needs known. Up ad jason. C/O abdominal pain. Some relief stated with PRN oxycodone. Pt states works better than dilaudid.    C/O intermittent nausea and vomiting.  Relief stated after vomiting.   C/O constipation.  PRN medications given. Provider contacted. Dose bisacodyl ordered.Pt states he normally has 2 BM/day. Last BM 2/10 p.m.  Last time he ate solid food was also 2/10 p.m.     Patient BP risin/110 @ 1500  Pt states he feels \"bloated,\"reports abdominal pain, nausea relived by vomiting, denies headache, dizziness.  "

## 2024-02-12 NOTE — PLAN OF CARE
Goal Outcome Evaluation:      Plan of Care Reviewed With: patient    Overall Patient Progress: no changeOverall Patient Progress: no change    Alert and oriented. Up to bathroom independently. Complaints of severe abdominal pain during the night, rating an 8 or 9 out of 10. He received IV Dilaudid at 2320, 0130, and 0420 with short lived relief. One episode of nausea and emesis while in the bathroom. Taking sips of water, but no other clear liquids since admission. Patient states emesis was clear. CIWA scores were 1 at 0000 and 0400. IV vitamin bag running at 100cc an hour for the whole bag, then will resume the ordered LR at 150cc an hour.

## 2024-02-12 NOTE — H&P
"[facility]    History and Physical  Hospital Medicine       Date of Admission:  2/11/2024  Date of Service: 2/11/2024     Assessment & Plan   Duane Ramos is a 28 year old male who presents on 2/11/2024 with severe epigastric pain and chronic alcohol use.    Alcohol-induced acute pancreatitis without infection or necrosis  Alcohol use disorder    States he drinks nearly daily and has 2-3 shots, with more on weekends. Last drink 0200 PTA. Woke up with severe epigastric pain 10/10. Arrived pale with frequent episodes of non-bloody emesis. Hasn't eaten since last night. Last bowel movement yesterday.    Lipase >3,000. AST 85. . WBC 24.9, likely reactionary. Afebrile. Alcohol level 0.1. UA non-infectious. CT showed acute interstitial pancreatitis, hepatic steatosis, small non-obstructing right renal calculi    - Clear liquid diet  - LR 150ml/hr  - CIWA protocol  - SW consulted for chemical dependency    Hepatic steatosis  Seen on CT on admission. AST 85. . Chronic alcohol use.  - CMP in am    Clinically Significant Risk Factors Present on Admission             # Anion Gap Metabolic Acidosis: Highest Anion Gap = 21 mmol/L in last 2 days, will monitor and treat as appropriate           # Obesity: Estimated body mass index is 31.32 kg/m  as calculated from the following:    Height as of this encounter: 1.702 m (5' 7\").    Weight as of this encounter: 90.7 kg (200 lb).               Diet: Clear Liquid Diet  DVT Prophylaxis: Ambulate every shift  Quevedo Catheter: Not present  Code Status: Full Code  Lines: Peripheral IV    Disposition Plan      Expected Discharge Date: 02/12/2024             Entered: Doroteo Mckeon PA-C 02/11/2024, 10:39 PM     Doroteo Mckeon PA-C        The patient's care was discussed with the Attending Physician, Dr. Malin .    Primary Care Physician   No primary care provider on file. None    History is obtained from the patient, his spouse, and review of " old records via the EMR.    History of Present Illness   Duane Ramos is a 28 year old male who presents on 2/11/2024 with severe epigastric pain and chronic alcohol use.    States he drinks almost daily and has 2-3 shots per night. He drank last night and his last drink was at 0200. He woke up around noon and developed 10/10 epigastric pain around 1500. He vomited 10x today. Last bowel movement last night. Last food intake last night.     Denies black vomit, black stools, chest pain, SOB, diarrhea, jaundice    Review of Systems   The 10 point Review of Systems is negative other than noted in the HPI or here.     Past Medical History    No past medical history on file.  Patient Active Problem List    Diagnosis Date Noted    Alcohol-induced acute pancreatitis without infection or necrosis 02/11/2024     Priority: Medium    RLQ abdominal pain 11/18/2017     Priority: Medium        Past Surgical History     Past Surgical History:   Procedure Laterality Date    OTHER SURGICAL HISTORY Right 2008    broken clavicle    ME LAP,APPENDECTOMY N/A 11/18/2017    Procedure: APPENDECTOMY, LAPAROSCOPIC;  Surgeon: Milton Francois MD;  Location: [facility];  Service: General        Prior to Admission Medications   None     Allergies   No Known Allergies    Family History    No family history on file.    Social History   Social History     Socioeconomic History    Marital status: Single     Spouse name: Not on file    Number of children: Not on file    Years of education: Not on file    Highest education level: Not on file   Occupational History    Not on file   Tobacco Use    Smoking status: Every Day    Smokeless tobacco: Never   Substance and Sexual Activity    Alcohol use: Yes    Drug use: No    Sexual activity: Not on file   Other Topics Concern    Not on file   Social History Narrative    Works as  at [employer].      Social Determinants of Health     Financial Resource Strain: Not on file   Food Insecurity: Not  "on file   Transportation Needs: Not on file   Physical Activity: Not on file   Stress: Not on file   Social Connections: Not on file   Interpersonal Safety: Not on file   Housing Stability: Not on file       Physical Exam   BP (!) 149/99   Pulse 69   Temp 97.5  F (36.4  C) (Oral)   Resp 18   Ht 1.702 m (5' 7\")   Wt 90.7 kg (200 lb)   SpO2 93%   BMI 31.32 kg/m       Weight: 200 lbs 0 oz Body mass index is 31.32 kg/m .     Constitutional: Alert, oriented, cooperative, in no acute distress, appears nontoxic.  HENT: Oropharynx is clear and moist. No evidence of cranial trauma. Normocephalic. Eyes anicteric. EOM intact. PERRLA  Cardiovascular: Regular rate and rhythm, normal S1 and S2, and no murmur noted. Good peripheral pulses in wrists bilaterally. No lower extremity edema.  Respiratory: Clear to auscultation bilaterally with no adventitious breath sounds.   GI: Soft, tender in epigastrium, normal bowel sounds, no hepatomegaly, no masses.  Musculoskeletal: Normal muscle bulk and tone. FROM in all extremities   Skin: Warm and dry, no rashes.   Neurologic: Cranial nerves 2-12 are grossly intact.       Data   Data reviewed today:   Recent Labs   Lab 02/11/24  1813   WBC 24.9*   HGB 16.7   MCV 88         POTASSIUM 3.6   CHLORIDE 101   CO2 22   BUN 17.5   CR 1.06   ANIONGAP 21*   BUZZ 9.6   *   ALBUMIN 5.1   PROTTOTAL 8.6*   BILITOTAL 0.3   ALKPHOS 67   *   AST 85*   LIPASE >3,000*       Recent Results (from the past 24 hour(s))   CT Abdomen Pelvis w Contrast    Narrative    EXAM: CT ABDOMEN PELVIS W CONTRAST  LOCATION: [facility]  DATE: 2/11/2024    INDICATION: Severe abdominal pain, peritonitis on exam, significant leukocytosis  COMPARISON: 11/17/2017  TECHNIQUE: CT scan of the abdomen and pelvis was performed following injection of IV contrast. Multiplanar reformats were obtained. Dose reduction techniques were used.  CONTRAST: 97mL isovue 370    FINDINGS: "   LOWER CHEST: Normal.    HEPATOBILIARY: Diffuse hepatic steatosis. No radiopaque gallstones.    PANCREAS: Mild pancreatic enlargement with surrounding inflammatory/edematous changes. These extend inferiorly along the anterior pararenal spaces and into the transverse mesial colon. No contained areas of fluid accumulation with peripheral enhancement   to suggest acute pancreatic fluid collections. Preserved glandular enhancement. No evidence for necrosis.    SPLEEN: Normal.    ADRENAL GLANDS: Normal.    KIDNEYS/BLADDER: 5 mm calculus within the interpolar region of the right kidney. 3 mm calculus within the upper pole of the right kidney. No hydronephrosis.    BOWEL: Normal caliber small bowel. Postoperative changes within the cecum. Normal caliber colon.    LYMPH NODES: Ill-defined celiac axis lymph nodes which measure less than 1 cm in diameter. These are most likely reactive.    VASCULATURE: No abdominal aortic aneurysm. Normal portal venous and splenic venous enhancement. Normal hepatic venous enhancement.    PELVIC ORGANS: Small amount of free fluid within the pelvis. Partially visualized small bilateral hydroceles.    MUSCULOSKELETAL: Normal.      Impression    IMPRESSION:   1.  Imaging features consistent with acute interstitial pancreatitis. No evidence for acute pancreatic fluid collections. No necrosis.  2.  Hepatic steatosis.  3.  Small, nonobstructing right renal calculi.       I personally reviewed the abdominal CT image(s) showing pancreatitis, hepatic steatosis.

## 2024-02-12 NOTE — PROGRESS NOTES
"[facility]    Hospitalist Progress Note    Date of Service (when I saw the patient): 02/12/2024    Assessment & Plan   Duane Ramos is a 28 year old male who presents on 2/11/2024 with severe epigastric pain and chronic alcohol use.     Alcohol-induced acute pancreatitis without infection or necrosis  Alcohol use disorder    States he drinks nearly daily and has 2-3 shots, with more on weekends. Last drink 0200 PTA. Woke up with severe epigastric pain 10/10. Arrived pale with frequent episodes of non-bloody emesis. Hasn't eaten since last night. Last bowel movement yesterday.    Lipase >3,000. AST 85. . WBC 24.9, likely reactionary. Afebrile. Alcohol level 0.1. UA non-infectious. CT showed acute interstitial pancreatitis, hepatic steatosis, small non-obstructing right renal calculi  - Patient tolerating only sips, continue clear liquid diet  - LR 150ml/hr  - CIWA protocol, recommend cessation  - Patient declines chemical dependency     Hepatic steatosis  Seen on CT on admission. AST 85. . Chronic alcohol use.  -Transaminases improving    Clinically Significant Risk Factors Present on Admission       # Anion Gap Metabolic Acidosis: Highest Anion Gap = 21 mmol/L in last 2 days, will monitor and treat as appropriate           # Obesity: Estimated body mass index is 31.32 kg/m  as calculated from the following:    Height as of this encounter: 1.702 m (5' 7\").    Weight as of this encounter: 90.7 kg (200 lb).             Diet: Clear Liquid Diet  DVT Prophylaxis: Ambulate every shift  Quevedo Catheter: Not present  Code Status: Full Code  Lines: Peripheral IV     Disposition: Expected discharge in 1-3 days once tolerating regular diet.    40 MINUTES SPENT BY ME on the date of service doing chart review, history, exam, documentation & further activities per the note.    Leo Melendez MD    Interval History   Patient sitting comfortably in bed.  He states that he does not want " to eat due to epigastric pain.  Also complains of constipation, last bowel movement was several days ago.    -Data reviewed today: I reviewed all new labs and imaging results over the last 24 hours. I personally reviewed no images or EKG's today.    Physical Exam   Temp: 98  F (36.7  C) Temp src: Oral BP: (!) 161/104 Pulse: 68   Resp: 16 SpO2: 95 % O2 Device: None (Room air)    Vitals:    02/11/24 1800   Weight: 90.7 kg (200 lb)     Vital Signs with Ranges  Temp:  [97.5  F (36.4  C)-98.3  F (36.8  C)] 98  F (36.7  C)  Pulse:  [67-80] 68  Resp:  [16-18] 16  BP: (124-186)/() 161/104  SpO2:  [93 %-95 %] 95 %  No intake/output data recorded.    Gen: Well nourished, well developed, alert and oriented x 3, no acute distressed  HEENT: Atraumatic, normocephalic; sclera non-injected, anicterric; oral mucosa moist, no lesion, no exudate  Lungs: Clear to ausculation, no wheezes, no rhonchi, no rales  Heart: Regular rate, regular rhythm, no gallops, no rubs, no murmurs  GI: Bowel sound minimal no hepatosplenomegaly, no masses, mild epigastric tenderness, non-distended, no guarding, no rebound tenderness  Lymph: No lymphadenopathy, no edema  Skin: No rashes, no chronic venous stasis     Medications    lactated ringers Stopped (02/12/24 0932)      cloNIDine  0.1 mg Oral Q8H    folic acid  1 mg Oral Daily    [START ON 2/19/2024] gabapentin  100 mg Oral Q8H    [START ON 2/17/2024] gabapentin  300 mg Oral Q8H    [START ON 2/15/2024] gabapentin  600 mg Oral Q8H    gabapentin  900 mg Oral Q8H    multivitamin w/minerals  1 tablet Oral Daily    sodium chloride (PF)  3 mL Intracatheter Q8H    thiamine  100 mg Oral Daily       Data   Recent Labs   Lab 02/12/24  0510 02/11/24  1813   WBC 17.8* 24.9*   HGB 16.7 16.7   MCV 88 88    310    144   POTASSIUM 4.8 3.6   CHLORIDE 99 101   CO2 21* 22   BUN 19.4 17.5   CR 0.89 1.06   ANIONGAP 18* 21*   BUZZ 8.7 9.6   * 145*   ALBUMIN 4.6 5.1   PROTTOTAL 7.5 8.6*   BILITOTAL  0.6 0.3   ALKPHOS 55 67   * 168*   AST 56* 85*   LIPASE  --  >3,000*       Recent Results (from the past 24 hour(s))   CT Abdomen Pelvis w Contrast    Narrative    EXAM: CT ABDOMEN PELVIS W CONTRAST  LOCATION: [facility]  DATE: 2/11/2024    INDICATION: Severe abdominal pain, peritonitis on exam, significant leukocytosis  COMPARISON: 11/17/2017  TECHNIQUE: CT scan of the abdomen and pelvis was performed following injection of IV contrast. Multiplanar reformats were obtained. Dose reduction techniques were used.  CONTRAST: 97mL isovue 370    FINDINGS:   LOWER CHEST: Normal.    HEPATOBILIARY: Diffuse hepatic steatosis. No radiopaque gallstones.    PANCREAS: Mild pancreatic enlargement with surrounding inflammatory/edematous changes. These extend inferiorly along the anterior pararenal spaces and into the transverse mesial colon. No contained areas of fluid accumulation with peripheral enhancement   to suggest acute pancreatic fluid collections. Preserved glandular enhancement. No evidence for necrosis.    SPLEEN: Normal.    ADRENAL GLANDS: Normal.    KIDNEYS/BLADDER: 5 mm calculus within the interpolar region of the right kidney. 3 mm calculus within the upper pole of the right kidney. No hydronephrosis.    BOWEL: Normal caliber small bowel. Postoperative changes within the cecum. Normal caliber colon.    LYMPH NODES: Ill-defined celiac axis lymph nodes which measure less than 1 cm in diameter. These are most likely reactive.    VASCULATURE: No abdominal aortic aneurysm. Normal portal venous and splenic venous enhancement. Normal hepatic venous enhancement.    PELVIC ORGANS: Small amount of free fluid within the pelvis. Partially visualized small bilateral hydroceles.    MUSCULOSKELETAL: Normal.      Impression    IMPRESSION:   1.  Imaging features consistent with acute interstitial pancreatitis. No evidence for acute pancreatic fluid collections. No necrosis.  2.  Hepatic  steatosis.  3.  Small, nonobstructing right renal calculi.

## 2024-02-13 LAB
ALBUMIN SERPL BCG-MCNC: 3.5 G/DL (ref 3.5–5.2)
ALP SERPL-CCNC: 37 U/L (ref 40–150)
ALT SERPL W P-5'-P-CCNC: 61 U/L (ref 0–70)
ANION GAP SERPL CALCULATED.3IONS-SCNC: 10 MMOL/L (ref 7–15)
AST SERPL W P-5'-P-CCNC: 57 U/L (ref 0–45)
BILIRUB DIRECT SERPL-MCNC: 0.52 MG/DL (ref 0–0.3)
BILIRUB SERPL-MCNC: 1.6 MG/DL
BUN SERPL-MCNC: 12.7 MG/DL (ref 6–20)
CALCIUM SERPL-MCNC: 8.3 MG/DL (ref 8.6–10)
CHLORIDE SERPL-SCNC: 94 MMOL/L (ref 98–107)
CREAT SERPL-MCNC: 0.89 MG/DL (ref 0.67–1.17)
DEPRECATED HCO3 PLAS-SCNC: 23 MMOL/L (ref 22–29)
EGFRCR SERPLBLD CKD-EPI 2021: >90 ML/MIN/1.73M2
ERYTHROCYTE [DISTWIDTH] IN BLOOD BY AUTOMATED COUNT: 12.1 % (ref 10–15)
GLUCOSE SERPL-MCNC: 170 MG/DL (ref 70–99)
HCT VFR BLD AUTO: 47 % (ref 40–53)
HGB BLD-MCNC: 16.1 G/DL (ref 13.3–17.7)
LIPASE SERPL-CCNC: 1127 U/L (ref 13–60)
MCH RBC QN AUTO: 30 PG (ref 26.5–33)
MCHC RBC AUTO-ENTMCNC: 34.3 G/DL (ref 31.5–36.5)
MCV RBC AUTO: 88 FL (ref 78–100)
PLATELET # BLD AUTO: 200 10E3/UL (ref 150–450)
POTASSIUM SERPL-SCNC: 4.4 MMOL/L (ref 3.4–5.3)
PROT SERPL-MCNC: 6.2 G/DL (ref 6.4–8.3)
RBC # BLD AUTO: 5.37 10E6/UL (ref 4.4–5.9)
SODIUM SERPL-SCNC: 127 MMOL/L (ref 135–145)
WBC # BLD AUTO: 19.9 10E3/UL (ref 4–11)

## 2024-02-13 PROCEDURE — 250N000013 HC RX MED GY IP 250 OP 250 PS 637

## 2024-02-13 PROCEDURE — 120N000001 HC R&B MED SURG/OB

## 2024-02-13 PROCEDURE — 250N000013 HC RX MED GY IP 250 OP 250 PS 637: Performed by: INTERNAL MEDICINE

## 2024-02-13 PROCEDURE — 258N000003 HC RX IP 258 OP 636

## 2024-02-13 PROCEDURE — 83690 ASSAY OF LIPASE: CPT | Performed by: INTERNAL MEDICINE

## 2024-02-13 PROCEDURE — 99232 SBSQ HOSP IP/OBS MODERATE 35: CPT | Performed by: INTERNAL MEDICINE

## 2024-02-13 PROCEDURE — 85027 COMPLETE CBC AUTOMATED: CPT | Performed by: INTERNAL MEDICINE

## 2024-02-13 PROCEDURE — 36415 COLL VENOUS BLD VENIPUNCTURE: CPT | Performed by: INTERNAL MEDICINE

## 2024-02-13 PROCEDURE — 80076 HEPATIC FUNCTION PANEL: CPT | Performed by: INTERNAL MEDICINE

## 2024-02-13 RX ORDER — BISACODYL 5 MG
10 TABLET, DELAYED RELEASE (ENTERIC COATED) ORAL AT BEDTIME
Status: DISCONTINUED | OUTPATIENT
Start: 2024-02-13 | End: 2024-02-14 | Stop reason: HOSPADM

## 2024-02-13 RX ORDER — AMOXICILLIN 250 MG
2 CAPSULE ORAL 2 TIMES DAILY
Status: DISCONTINUED | OUTPATIENT
Start: 2024-02-13 | End: 2024-02-14 | Stop reason: HOSPADM

## 2024-02-13 RX ADMIN — ACETAMINOPHEN 650 MG: 325 TABLET, FILM COATED ORAL at 09:25

## 2024-02-13 RX ADMIN — Medication 1 TABLET: at 08:10

## 2024-02-13 RX ADMIN — FOLIC ACID 1 MG: 1 TABLET ORAL at 08:09

## 2024-02-13 RX ADMIN — SODIUM CHLORIDE, POTASSIUM CHLORIDE, SODIUM LACTATE AND CALCIUM CHLORIDE: 600; 310; 30; 20 INJECTION, SOLUTION INTRAVENOUS at 13:18

## 2024-02-13 RX ADMIN — OXYCODONE HYDROCHLORIDE 5 MG: 5 TABLET ORAL at 12:59

## 2024-02-13 RX ADMIN — SODIUM CHLORIDE, POTASSIUM CHLORIDE, SODIUM LACTATE AND CALCIUM CHLORIDE: 600; 310; 30; 20 INJECTION, SOLUTION INTRAVENOUS at 05:13

## 2024-02-13 RX ADMIN — SENNOSIDES AND DOCUSATE SODIUM 2 TABLET: 8.6; 5 TABLET ORAL at 20:58

## 2024-02-13 RX ADMIN — SENNOSIDES AND DOCUSATE SODIUM 2 TABLET: 8.6; 5 TABLET ORAL at 13:00

## 2024-02-13 RX ADMIN — THIAMINE HCL TAB 100 MG 100 MG: 100 TAB at 08:09

## 2024-02-13 RX ADMIN — SODIUM CHLORIDE, POTASSIUM CHLORIDE, SODIUM LACTATE AND CALCIUM CHLORIDE: 600; 310; 30; 20 INJECTION, SOLUTION INTRAVENOUS at 19:43

## 2024-02-13 ASSESSMENT — ACTIVITIES OF DAILY LIVING (ADL)
ADLS_ACUITY_SCORE: 19
ADLS_ACUITY_SCORE: 18
ADLS_ACUITY_SCORE: 19
ADLS_ACUITY_SCORE: 18

## 2024-02-13 NOTE — PLAN OF CARE
"  Problem: Adult Inpatient Plan of Care  Goal: Plan of Care Review  Description: The Plan of Care Review/Shift note should be completed every shift.  The Outcome Evaluation is a brief statement about your assessment that the patient is improving, declining, or no change.  This information will be displayed automatically on your shift  note.  Outcome: Not Progressing  Flowsheets (Taken 2/12/2024 2153)  Plan of Care Reviewed With: patient  Goal: Patient-Specific Goal (Individualized)  Description: You can add care plan individualizations to a care plan. Examples of Individualization might be:  \"Parent requests to be called daily at 9am for status\", \"I have a hard time hearing out of my right ear\", or \"Do not touch me to wake me up as it startles  me\".  Outcome: Not Progressing  Goal: Absence of Hospital-Acquired Illness or Injury  Outcome: Not Progressing  Intervention: Identify and Manage Fall Risk  Recent Flowsheet Documentation  Taken 2/12/2024 1728 by Supriya Lara RN  Safety Promotion/Fall Prevention:   clutter free environment maintained   nonskid shoes/slippers when out of bed   room near nurse's station  Intervention: Prevent Skin Injury  Recent Flowsheet Documentation  Taken 2/12/2024 1728 by Supriya Lara RN  Body Position:   position changed independently   weight shifting  Taken 2/12/2024 1713 by Supriya Lara RN  Body Position:   position changed independently   weight shifting  Intervention: Prevent and Manage VTE (Venous Thromboembolism) Risk  Recent Flowsheet Documentation  Taken 2/12/2024 1728 by Supriya Lara RN  VTE Prevention/Management: SCDs (sequential compression devices) off  Goal: Optimal Comfort and Wellbeing  Outcome: Not Progressing  Intervention: Monitor Pain and Promote Comfort  Recent Flowsheet Documentation  Taken 2/12/2024 1728 by Supriya Lara RN  Pain Management Interventions: medication (see MAR)  Goal: Readiness for Transition of " Care  Outcome: Not Progressing     Problem: Pain Acute  Goal: Optimal Pain Control and Function  Outcome: Not Progressing  Intervention: Develop Pain Management Plan  Recent Flowsheet Documentation  Taken 2/12/2024 1728 by Supriya Lara RN  Pain Management Interventions: medication (see MAR)  Intervention: Prevent or Manage Pain  Recent Flowsheet Documentation  Taken 2/12/2024 1728 by Supriya Lara RN  Medication Review/Management: medications reviewed     Problem: Nausea and Vomiting  Goal: Nausea and Vomiting Relief  Outcome: Not Progressing   Goal Outcome Evaluation:      Plan of Care Reviewed With: patient      C/O RUQ and RLQ/LUQ pain, Oxycodone given.Patient stated pain was better. Ambulated in the hallways. No BM this shift, senna given. Patient requested TUMS.

## 2024-02-13 NOTE — PROGRESS NOTES
Pt is refusing his Gabapentin and Clonidine this am. CIWA's remain 0. Pt is A & O X 4 and totally appropriate.

## 2024-02-13 NOTE — PROGRESS NOTES
"[facility]    Hospitalist Progress Note    Date of Service (when I saw the patient): 02/13/2024    Assessment & Plan   Duane Ramos is a 28 year old male who presents on 2/11/2024 with severe epigastric pain and chronic alcohol use.     Alcohol-induced acute pancreatitis without infection or necrosis  Alcohol use disorder    States he drinks nearly daily and has 2-3 shots, with more on weekends. Last drink 0200 PTA. Woke up with severe epigastric pain 10/10. Arrived pale with frequent episodes of non-bloody emesis. Hasn't eaten since last night. Last bowel movement yesterday.    Lipase >3,000. AST 85. . WBC 24.9, likely reactionary. Afebrile. Alcohol level 0.1. UA non-infectious. CT showed acute interstitial pancreatitis, hepatic steatosis, small non-obstructing right renal calculi  - Patient tolerating some clear liquids, advance to full liquids for lunch  - Unfortunately, patient is not tolerating even clear liquids and has been vomiting.  He complains of abdominal distention and pain  - LR 150ml/hr  - CIWA protocol, recommend cessation  - Patient declines chemical dependency  - Patient to stay an additional night due to pain and poor intake    Narcotic induced constipation  Patient states been several days since he has had a bowel movement.  He complains of constipation.  - Shira-Colace 2 tabs twice daily and Dulcolax 10 mg at bedtime     Hepatic steatosis  Seen on CT on admission. AST 85. . Chronic alcohol use.  -Transaminases improving    Clinically Significant Risk Factors Present on Admission       # Anion Gap Metabolic Acidosis: Highest Anion Gap = 21 mmol/L in last 2 days, will monitor and treat as appropriate           # Obesity: Estimated body mass index is 31.32 kg/m  as calculated from the following:    Height as of this encounter: 1.702 m (5' 7\").    Weight as of this encounter: 90.7 kg (200 lb).             Diet: Clear Liquid Diet  DVT Prophylaxis: Ambulate " every shift  Quevedo Catheter: Not present  Code Status: Full Code  Lines: Peripheral IV     Disposition: Expected discharge in 1-3 days once tolerating regular diet.    45 MINUTES SPENT BY ME on the date of service doing chart review, history, exam, documentation & further activities per the note.    Leo Melendez MD    Interval History   Patient sitting in bed.  States his abdomen feels distended.  He complains of early satiety and constipation.  He continues to have abdominal pain requiring oxycodone.    -Data reviewed today: I reviewed all new labs and imaging results over the last 24 hours. I personally reviewed no images or EKG's today.    Physical Exam   Temp: 98.9  F (37.2  C) Temp src: Oral BP: (!) 163/95 Pulse: 107   Resp: 18 SpO2: 96 % O2 Device: None (Room air)    Vitals:    02/11/24 1800   Weight: 90.7 kg (200 lb)     Vital Signs with Ranges  Temp:  [98.6  F (37  C)-98.9  F (37.2  C)] 98.9  F (37.2  C)  Pulse:  [] 107  Resp:  [18] 18  BP: (131-178)/() 163/95  SpO2:  [96 %-97 %] 96 %  I/O last 3 completed shifts:  In: 3599 [P.O.:480; I.V.:3119]  Out: -     Gen: Well nourished, well developed, alert and oriented x 3, no acute distressed  HEENT: Atraumatic, normocephalic; sclera non-injected, anicterric; oral mucosa moist, no lesion, no exudate  Lungs: Clear to ausculation, no wheezes, no rhonchi, no rales  Heart: Regular rate, regular rhythm, no gallops, no rubs, no murmurs  GI: Bowel sound minimal no hepatosplenomegaly, no masses, mild epigastric tenderness, moderately distended, no guarding, no rebound tenderness  Lymph: No lymphadenopathy, no edema  Skin: No rashes, no chronic venous stasis     Medications    lactated ringers 150 mL/hr at 02/13/24 1318      [Held by provider] cloNIDine  0.1 mg Oral Q8H    folic acid  1 mg Oral Daily    [Held by provider] gabapentin  100 mg Oral Q8H    [Held by provider] gabapentin  300 mg Oral Q8H    [Held by provider] gabapentin  600 mg Oral Q8H     [Held by provider] gabapentin  900 mg Oral Q8H    multivitamin w/minerals  1 tablet Oral Daily    sodium chloride (PF)  3 mL Intracatheter Q8H    thiamine  100 mg Oral Daily       Data   Recent Labs   Lab 02/13/24  0557 02/12/24  0510 02/11/24  1813   WBC 19.9* 17.8* 24.9*   HGB 16.1 16.7 16.7   MCV 88 88 88    253 310   * 138 144   POTASSIUM 4.4 4.8 3.6   CHLORIDE 94* 99 101   CO2 23 21* 22   BUN 12.7 19.4 17.5   CR 0.89 0.89 1.06   ANIONGAP 10 18* 21*   BUZZ 8.3* 8.7 9.6   * 200* 145*   ALBUMIN 3.5 4.6 5.1   PROTTOTAL 6.2* 7.5 8.6*   BILITOTAL 1.6* 0.6 0.3   ALKPHOS 37* 55 67   ALT 61 123* 168*   AST 57* 56* 85*   LIPASE 1,127*  --  >3,000*       No results found for this or any previous visit (from the past 24 hour(s)).

## 2024-02-13 NOTE — PLAN OF CARE
Goal Outcome Evaluation:      Plan of Care Reviewed With: patient, significant other    Overall Patient Progress: no change    A&Ox4. Up ad jason per clearance. Able to make needs known. C/O epigastic and abdominal pain. PRN medications give some relief. Concern with lack of bowel movement. States he normally has 2 bowel movements a day and last had a BM on 10th.     Ambulated in [place] with fiance. Reported increased epigastric pain 7/10. Emesis with mucous clumps colored tan/brown upon return to room.    Ambulated 2 more times in [place] with fiance.  Reported increased abdominal pain and nausea, but no emesis.    Pt was able to eat full full-liquid diet for dinner without nausea. Reported feeling very full.

## 2024-02-13 NOTE — PROGRESS NOTES
"CIWA= 0 X 2. Pt states abdominal pain \"not too bad\" when asked. Pt would prefer not to take narcotics in order to help his bowels start moving again. Pt states no flatus or BM so far overnight. IV continues patent at 150 ml/hr.  "

## 2024-02-14 VITALS
DIASTOLIC BLOOD PRESSURE: 85 MMHG | TEMPERATURE: 98.6 F | SYSTOLIC BLOOD PRESSURE: 166 MMHG | RESPIRATION RATE: 18 BRPM | WEIGHT: 200 LBS | OXYGEN SATURATION: 92 % | HEIGHT: 67 IN | HEART RATE: 97 BPM | BODY MASS INDEX: 31.39 KG/M2

## 2024-02-14 LAB
ALBUMIN SERPL BCG-MCNC: 3.3 G/DL (ref 3.5–5.2)
ALP SERPL-CCNC: 41 U/L (ref 40–150)
ALT SERPL W P-5'-P-CCNC: 42 U/L (ref 0–70)
ANION GAP SERPL CALCULATED.3IONS-SCNC: 11 MMOL/L (ref 7–15)
AST SERPL W P-5'-P-CCNC: 41 U/L (ref 0–45)
BASOPHILS # BLD AUTO: 0 10E3/UL (ref 0–0.2)
BASOPHILS NFR BLD AUTO: 0 %
BILIRUB SERPL-MCNC: 1 MG/DL
BUN SERPL-MCNC: 8.2 MG/DL (ref 6–20)
CALCIUM SERPL-MCNC: 8.5 MG/DL (ref 8.6–10)
CHLORIDE SERPL-SCNC: 99 MMOL/L (ref 98–107)
CREAT SERPL-MCNC: 0.81 MG/DL (ref 0.67–1.17)
DEPRECATED HCO3 PLAS-SCNC: 25 MMOL/L (ref 22–29)
EGFRCR SERPLBLD CKD-EPI 2021: >90 ML/MIN/1.73M2
EOSINOPHIL # BLD AUTO: 0 10E3/UL (ref 0–0.7)
EOSINOPHIL NFR BLD AUTO: 0 %
ERYTHROCYTE [DISTWIDTH] IN BLOOD BY AUTOMATED COUNT: 12.3 % (ref 10–15)
GLUCOSE SERPL-MCNC: 153 MG/DL (ref 70–99)
HCT VFR BLD AUTO: 35.1 % (ref 40–53)
HGB BLD-MCNC: 12 G/DL (ref 13.3–17.7)
IMM GRANULOCYTES # BLD: 0.1 10E3/UL
IMM GRANULOCYTES NFR BLD: 1 %
LYMPHOCYTES # BLD AUTO: 0.8 10E3/UL (ref 0.8–5.3)
LYMPHOCYTES NFR BLD AUTO: 7 %
MCH RBC QN AUTO: 30.7 PG (ref 26.5–33)
MCHC RBC AUTO-ENTMCNC: 34.2 G/DL (ref 31.5–36.5)
MCV RBC AUTO: 90 FL (ref 78–100)
MONOCYTES # BLD AUTO: 0.8 10E3/UL (ref 0–1.3)
MONOCYTES NFR BLD AUTO: 7 %
NEUTROPHILS # BLD AUTO: 9.4 10E3/UL (ref 1.6–8.3)
NEUTROPHILS NFR BLD AUTO: 85 %
NRBC # BLD AUTO: 0 10E3/UL
NRBC BLD AUTO-RTO: 0 /100
PLATELET # BLD AUTO: 135 10E3/UL (ref 150–450)
POTASSIUM SERPL-SCNC: 3.8 MMOL/L (ref 3.4–5.3)
PROT SERPL-MCNC: 6.1 G/DL (ref 6.4–8.3)
RBC # BLD AUTO: 3.91 10E6/UL (ref 4.4–5.9)
SODIUM SERPL-SCNC: 135 MMOL/L (ref 135–145)
WBC # BLD AUTO: 11.1 10E3/UL (ref 4–11)

## 2024-02-14 PROCEDURE — 99239 HOSP IP/OBS DSCHRG MGMT >30: CPT | Performed by: INTERNAL MEDICINE

## 2024-02-14 PROCEDURE — 250N000013 HC RX MED GY IP 250 OP 250 PS 637

## 2024-02-14 PROCEDURE — 80053 COMPREHEN METABOLIC PANEL: CPT | Performed by: INTERNAL MEDICINE

## 2024-02-14 PROCEDURE — 258N000003 HC RX IP 258 OP 636

## 2024-02-14 PROCEDURE — 85025 COMPLETE CBC W/AUTO DIFF WBC: CPT | Performed by: INTERNAL MEDICINE

## 2024-02-14 PROCEDURE — 250N000013 HC RX MED GY IP 250 OP 250 PS 637: Performed by: INTERNAL MEDICINE

## 2024-02-14 PROCEDURE — 36415 COLL VENOUS BLD VENIPUNCTURE: CPT | Performed by: INTERNAL MEDICINE

## 2024-02-14 RX ADMIN — FOLIC ACID 1 MG: 1 TABLET ORAL at 08:07

## 2024-02-14 RX ADMIN — THIAMINE HCL TAB 100 MG 100 MG: 100 TAB at 08:07

## 2024-02-14 RX ADMIN — Medication 1 TABLET: at 08:07

## 2024-02-14 RX ADMIN — SENNOSIDES AND DOCUSATE SODIUM 2 TABLET: 8.6; 5 TABLET ORAL at 08:07

## 2024-02-14 RX ADMIN — SODIUM CHLORIDE, POTASSIUM CHLORIDE, SODIUM LACTATE AND CALCIUM CHLORIDE: 600; 310; 30; 20 INJECTION, SOLUTION INTRAVENOUS at 01:50

## 2024-02-14 ASSESSMENT — ACTIVITIES OF DAILY LIVING (ADL)
ADLS_ACUITY_SCORE: 18

## 2024-02-14 NOTE — PLAN OF CARE
Goal Outcome Evaluation:      Plan of Care Reviewed With: patient    Overall Patient Progress: improvingOverall Patient Progress: improving    Outcome Evaluation: Patient's nausea has improved. Patient's diet advanced yesterday from liquids to full liquid. Discharge probable once patient tolerating a regular diet.

## 2024-02-14 NOTE — DISCHARGE SUMMARY
[facility]  Hospitalist Discharge Summary       Date of Admission:  2/11/2024  Date of Discharge:  2/14/2024 11:50 AM  Discharging Provider: Leo Melendez MD      Discharge Diagnoses     Alcohol-induced acute pancreatitis without infection or necrosis  Alcohol use disorder  Narcotic induced constipation  Hepatic steatosis  Possible essential hypertension  Anion Gap Metabolic Acidosis  Obesity    Follow-ups Needed After Discharge   Follow-up Appointments     Follow-up and recommended labs and tests       Follow up with primary care provider, Physician No Ref-Primary, within 7   days for hospital follow- up.  The following labs/tests are recommended:   CBC and CMP.          Discharge Disposition   Discharged to home  Condition at discharge: Stable    Hospital Course   Duane Ramos is a 28 year old male who presents on 2/11/2024 with severe epigastric pain due to alcoholic pancreatitis from chronic alcohol use.     Alcohol-induced acute pancreatitis without infection or necrosis  Alcohol use disorder    States he drinks nearly daily and has 2-3 shots, with more on weekends. Last drink 0200 PTA. Woke up with severe epigastric pain 10/10. Arrived pale with frequent episodes of non-bloody emesis.     On admission, lipase >3,000. AST 85. . WBC 24.9, likely reactionary. Afebrile. Alcohol level 0.1. UA non-infectious. CT showed acute interstitial pancreatitis, hepatic steatosis, small non-obstructing right renal calculi  - LR 150ml/hr  - Recommend alcohol cessation  - Patient declines chemical dependency  - Patient tolerating full liquid diet at time of discharge    Narcotic induced constipation  Patient states been several days since he has had a bowel movement.  He complains of constipation.  - Shira-Colace 2 tabs twice daily and Dulcolax 10 mg at bedtime during hospitalization.  Constipation resolved prior to discharge     Hepatic steatosis  Seen on CT on admission. AST 85. ALT  "168. Chronic alcohol use.  -Transaminases improving    Possible essential hypertension  Blood pressure noted to be elevated during hospitalization  -Patient encouraged to establish care with PCP for monitoring  -Recommending patient  a home blood pressure monitor to record blood pressure at home.    Clinically Significant Risk Factors Present on Admission       # Anion Gap Metabolic Acidosis: Highest Anion Gap = 21 mmol/L in last 2 days, will monitor and treat as appropriate           # Obesity: Estimated body mass index is 31.32 kg/m  as calculated from the following:    Height as of this encounter: 1.702 m (5' 7\").    Weight as of this encounter: 90.7 kg (200 lb).           Consultations This Hospital Stay   CARE MANAGEMENT / SOCIAL WORK IP CONSULT    Code Status   Full Code    35 MINUTES SPENT BY ME on the date of service doing chart review, history, exam, documentation & further activities per the note.         Leo Melendez MD  [facility]  ______________________________________________________________________    Physical Exam   Vital Signs: Temp: 98.6  F (37  C) Temp src: Oral BP: (!) 166/85 Pulse: 97   Resp: 18 SpO2: 92 % O2 Device: None (Room air)    Weight: 200 lbs 0 oz       Gen: Well nourished, well developed, alert and oriented x 3, no acute distressed  HEENT: Atraumatic, normocephalic; sclera non-injected, anicterric; oral mucosa moist, no lesion, no exudate  Lungs: Clear to ausculation, no wheezes, no rhonchi, no rales  Heart: Regular rate, regular rhythm, no gallops, no rubs, no murmurs  GI: Bowel sound normal, no hepatosplenomegaly, no masses, non-tender, non-distended, no guarding, no rebound tenderness  Lymph: No lymphadenopathy, no edema  Skin: No rashes, no chronic venous stasis     Primary Care Physician   Physician No Ref-Primary    Discharge Orders      Reason for your hospital stay    This is a 28-year-old male admitted with alcoholic pancreatitis. "     Follow-up and recommended labs and tests     Follow up with primary care provider, Physician No Ref-Primary, within 7 days for hospital follow- up.  The following labs/tests are recommended: CBC and CMP.     Activity    Your activity upon discharge: activity as tolerated     Full Code     Diet    Follow this diet upon discharge: Orders Placed This Encounter      Full Liquid Diet         Significant Results and Procedures   Most Recent 3 CBC's:  Recent Labs   Lab Test 02/14/24  0554 02/13/24  0557 02/12/24  0510   WBC 11.1* 19.9* 17.8*   HGB 12.0* 16.1 16.7   MCV 90 88 88   * 200 253     Most Recent 3 BMP's:  Recent Labs   Lab Test 02/14/24  0554 02/13/24  0557 02/12/24  0510    127* 138   POTASSIUM 3.8 4.4 4.8   CHLORIDE 99 94* 99   CO2 25 23 21*   BUN 8.2 12.7 19.4   CR 0.81 0.89 0.89   ANIONGAP 11 10 18*   BUZZ 8.5* 8.3* 8.7   * 170* 200*     Most Recent 2 LFT's:  Recent Labs   Lab Test 02/14/24  0554 02/13/24  0557   AST 41 57*   ALT 42 61   ALKPHOS 41 37*   BILITOTAL 1.0 1.6*   ,   Results for orders placed or performed during the hospital encounter of 02/11/24   CT Abdomen Pelvis w Contrast    Narrative    EXAM: CT ABDOMEN PELVIS W CONTRAST  LOCATION: [facility]  DATE: 2/11/2024    INDICATION: Severe abdominal pain, peritonitis on exam, significant leukocytosis  COMPARISON: 11/17/2017  TECHNIQUE: CT scan of the abdomen and pelvis was performed following injection of IV contrast. Multiplanar reformats were obtained. Dose reduction techniques were used.  CONTRAST: 97mL isovue 370    FINDINGS:   LOWER CHEST: Normal.    HEPATOBILIARY: Diffuse hepatic steatosis. No radiopaque gallstones.    PANCREAS: Mild pancreatic enlargement with surrounding inflammatory/edematous changes. These extend inferiorly along the anterior pararenal spaces and into the transverse mesial colon. No contained areas of fluid accumulation with peripheral enhancement   to suggest acute  pancreatic fluid collections. Preserved glandular enhancement. No evidence for necrosis.    SPLEEN: Normal.    ADRENAL GLANDS: Normal.    KIDNEYS/BLADDER: 5 mm calculus within the interpolar region of the right kidney. 3 mm calculus within the upper pole of the right kidney. No hydronephrosis.    BOWEL: Normal caliber small bowel. Postoperative changes within the cecum. Normal caliber colon.    LYMPH NODES: Ill-defined celiac axis lymph nodes which measure less than 1 cm in diameter. These are most likely reactive.    VASCULATURE: No abdominal aortic aneurysm. Normal portal venous and splenic venous enhancement. Normal hepatic venous enhancement.    PELVIC ORGANS: Small amount of free fluid within the pelvis. Partially visualized small bilateral hydroceles.    MUSCULOSKELETAL: Normal.      Impression    IMPRESSION:   1.  Imaging features consistent with acute interstitial pancreatitis. No evidence for acute pancreatic fluid collections. No necrosis.  2.  Hepatic steatosis.  3.  Small, nonobstructing right renal calculi.       Discharge Medications   There are no discharge medications for this patient.    Allergies   No Known Allergies

## 2024-02-14 NOTE — PLAN OF CARE
[facility] DISCHARGE NOTE    Patient discharged to home at 11:51 AM via wheel chair. Accompanied by spouse and staff. Discharge instructions reviewed with patient, opportunity offered to ask questions. Prescriptions - None ordered for discharge. All belongings sent with patient.    Nita Cosby RN

## 2024-02-15 ENCOUNTER — OFFICE VISIT (OUTPATIENT)
Dept: URGENT CARE | Facility: URGENT CARE | Age: 29
End: 2024-02-15
Payer: COMMERCIAL

## 2024-02-15 VITALS
DIASTOLIC BLOOD PRESSURE: 95 MMHG | SYSTOLIC BLOOD PRESSURE: 168 MMHG | OXYGEN SATURATION: 96 % | HEART RATE: 87 BPM | BODY MASS INDEX: 31.95 KG/M2 | RESPIRATION RATE: 20 BRPM | WEIGHT: 204 LBS | TEMPERATURE: 98.8 F

## 2024-02-15 DIAGNOSIS — R05.9 COUGH, UNSPECIFIED TYPE: Primary | ICD-10-CM

## 2024-02-15 PROCEDURE — 99212 OFFICE O/P EST SF 10 MIN: CPT | Performed by: NURSE PRACTITIONER

## 2024-02-15 RX ORDER — IBUPROFEN 200 MG
400 TABLET ORAL EVERY 6 HOURS PRN
Status: ON HOLD | COMMUNITY
End: 2024-03-28

## 2024-02-15 NOTE — PROGRESS NOTES
SUBJECTIVE:   Duane Ramos is a 28 year old male presenting with a chief complaint of cough  Discharged from hospital yesterday for alcohol induced pancreatitis was there 4 days  Noticed on his AVS his 02 was only 92. Presents today to have it rechecked  Feels like symptoms are improving  Denies anything new or worsening with fever or sob, no body aches  Hydrated  Was told to do full liquid diet    History reviewed. No pertinent past medical history.  Current Outpatient Medications   Medication Sig Dispense Refill    ibuprofen (ADVIL/MOTRIN) 200 MG tablet Take 200 mg by mouth every 4 hours as needed for pain       Social History     Tobacco Use    Smoking status: Former     Types: Cigarettes    Smokeless tobacco: Never   Substance Use Topics    Alcohol use: Yes       ROS:  Review of systems negative except as stated above.    OBJECTIVE:  BP (!) 168/95   Pulse 87   Temp 98.8  F (37.1  C) (Oral)   Resp 20   Wt 92.5 kg (204 lb)   SpO2 96%   BMI 31.95 kg/m    GENERAL APPEARANCE: alert and no distress  RESP: lungs clear to auscultation - no rales, rhonchi or wheezes  CV: regular rates and rhythm, normal S1 S2, no murmur noted    ASSESSMENT:  (R05.9) Cough, unspecified type  (primary encounter diagnosis)    Plan: Will home treat and monitor sx vitals are stable o2 is increased to 96 percent lungs are clear  To er if emergent sx as reviewed   Not improving see pcp

## 2024-02-19 ENCOUNTER — TELEPHONE (OUTPATIENT)
Facility: CLINIC | Age: 29
End: 2024-02-19
Payer: COMMERCIAL

## 2024-02-19 NOTE — TELEPHONE ENCOUNTER
Forms/Letter Request    Type of form/letter: letter- pt requesting a work note re admission dates and a return to work date    Do we have the form/letter: Yes    When is form/letter needed by: asap    How would you like the form/letter returned: [facility]    Patient Notified form requests are processed in 5-7 business days:Yes    Could we send this information to you in [place] or would you prefer to receive a phone call?:   Patient would prefer a phone call   Okay to leave a detailed message?: Yes at Cell number on file:    Telephone Information:   Mobile [phone]

## 2024-02-26 NOTE — PROGRESS NOTES
"  Assessment & Plan     (Z09) Hospital discharge follow-up  (primary encounter diagnosis)  (K85.20) Alcohol-induced acute pancreatitis without infection or necrosis  Comment: improved  Plan: Comprehensive metabolic panel (BMP + Alb, Alk         Phos, ALT, AST, Total. Bili, TP), Lipase, CT         Abdomen Pelvis w Contrast        Check labs, CT ordered.            MED REC REQUIRED  Post Medication Reconciliation Status: discharge medications reconciled, continue medications without change  BMI  Estimated body mass index is 29.44 kg/m  as calculated from the following:    Height as of this encounter: 1.702 m (5' 7\").    Weight as of this encounter: 85.3 kg (188 lb).   Weight management plan: Discussed healthy diet and exercise guidelines      See Patient Instructions    Subjective   Duane is a 28 year old, presenting for the following health issues:  ER F/U        2/29/2024     7:57 AM   Additional Questions   Roomed by Eliud Kline MA   Accompanied by Self     History of Present Illness       Reason for visit:  I was admitted for pancreatitis. I need follow up scans    He eats 0-1 servings of fruits and vegetables daily.He consumes 1 sweetened beverage(s) daily.He exercises with enough effort to increase his heart rate 30 to 60 minutes per day.  He exercises with enough effort to increase his heart rate 5 days per week.   He is taking medications regularly.           Hospital Follow-up Visit:    Hospital/Nursing Home/IP Rehab Facility:  [facility]  Date of Admission: 02/11/2024  Date of Discharge: 02/15/2024  Reason(s) for Admission: Pancreatitis    Was your hospitalization related to COVID-19? No   Problems taking medications regularly:  None  Medication changes since discharge: None  Problems adhering to non-medication therapy:  None    Summary of hospitalization:  [facility] hospital discharge summary reviewed  Diagnostic Tests/Treatments reviewed.  Follow up needed: repeat CT and labs  Other " "Healthcare Providers Involved in Patient s Care:         None  Update since discharge: improved.     Continues to have some mild pain in left mid abdomen, no nausea/vomiting, no diarrhea.   Constipation has improved  He has stopped alcohol, tried THC drink though not planning to use that regularly.  No longer smokes or chews, does not vape.    Feels he does not need support to remain sober.      Plan of care communicated with patient                   Review of Systems  Constitutional, HEENT, cardiovascular, pulmonary, gi and gu systems are negative, except as otherwise noted.      Objective    /83   Pulse 77   Temp 97.8  F (36.6  C) (Tympanic)   Resp 16   Ht 1.702 m (5' 7\")   Wt 85.3 kg (188 lb)   SpO2 98%   BMI 29.44 kg/m    Body mass index is 29.44 kg/m .  Physical Exam   GENERAL: alert and no distress  EYES: Eyes grossly normal to inspection, PERRL and conjunctivae and sclerae normal  ABDOMEN: tenderness left middle abdomen, no R/G/R, no organomegaly or masses, and bowel sounds normal  MS: no gross musculoskeletal defects noted, no edema  SKIN: no suspicious lesions or rashes  PSYCH: mentation appears normal, affect normal/bright            Signed Electronically by: Rogelio Mcdonough PA-C      "

## 2024-02-27 NOTE — PROGRESS NOTES
FYI: Tracking    Waste of 5 mg oxycodone recorded as 0 mg in [employer].  Medication fell out of its torn packaging onto the floor when removed from [employer]. This was never brought to patient (charted as not administered). Second dose of 5 mg oxycodone removed from [employer] was administered to patient (charted as administered).     Event witnessed by Robbie YORK RN. Reported to charge nurse Hermes SOMERS and floor pharmacist. Reattempt to get [employer] to record waste properly with Hermes SOMERS RN failed. Hermes SOMERS RN witnessed waste into waste container and reported it.  Follow-up to event by Meche CABRERA, pharmacy .

## 2024-02-29 ENCOUNTER — OFFICE VISIT (OUTPATIENT)
Dept: FAMILY MEDICINE | Facility: CLINIC | Age: 29
End: 2024-02-29
Payer: COMMERCIAL

## 2024-02-29 VITALS
DIASTOLIC BLOOD PRESSURE: 83 MMHG | OXYGEN SATURATION: 98 % | SYSTOLIC BLOOD PRESSURE: 128 MMHG | TEMPERATURE: 97.8 F | BODY MASS INDEX: 29.51 KG/M2 | HEIGHT: 67 IN | RESPIRATION RATE: 16 BRPM | WEIGHT: 188 LBS | HEART RATE: 77 BPM

## 2024-02-29 DIAGNOSIS — K85.20 ALCOHOL-INDUCED ACUTE PANCREATITIS WITHOUT INFECTION OR NECROSIS: ICD-10-CM

## 2024-02-29 DIAGNOSIS — R93.5 ABNORMAL CT OF THE ABDOMEN: ICD-10-CM

## 2024-02-29 DIAGNOSIS — Z09 HOSPITAL DISCHARGE FOLLOW-UP: Primary | ICD-10-CM

## 2024-02-29 PROBLEM — F10.10 ETOH ABUSE: Status: ACTIVE | Noted: 2024-02-29

## 2024-02-29 LAB
ALBUMIN SERPL BCG-MCNC: 4.4 G/DL (ref 3.5–5.2)
ALP SERPL-CCNC: 64 U/L (ref 40–150)
ALT SERPL W P-5'-P-CCNC: 50 U/L (ref 0–70)
ANION GAP SERPL CALCULATED.3IONS-SCNC: 13 MMOL/L (ref 7–15)
AST SERPL W P-5'-P-CCNC: 34 U/L (ref 0–45)
BILIRUB SERPL-MCNC: 0.4 MG/DL
BUN SERPL-MCNC: 12.5 MG/DL (ref 6–20)
CALCIUM SERPL-MCNC: 10 MG/DL (ref 8.6–10)
CHLORIDE SERPL-SCNC: 101 MMOL/L (ref 98–107)
CREAT SERPL-MCNC: 0.96 MG/DL (ref 0.67–1.17)
DEPRECATED HCO3 PLAS-SCNC: 24 MMOL/L (ref 22–29)
EGFRCR SERPLBLD CKD-EPI 2021: >90 ML/MIN/1.73M2
GLUCOSE SERPL-MCNC: 107 MG/DL (ref 70–99)
LIPASE SERPL-CCNC: 127 U/L (ref 13–60)
POTASSIUM SERPL-SCNC: 4.5 MMOL/L (ref 3.4–5.3)
PROT SERPL-MCNC: 7.3 G/DL (ref 6.4–8.3)
SODIUM SERPL-SCNC: 138 MMOL/L (ref 135–145)

## 2024-02-29 PROCEDURE — 99214 OFFICE O/P EST MOD 30 MIN: CPT | Performed by: FAMILY MEDICINE

## 2024-02-29 PROCEDURE — 36415 COLL VENOUS BLD VENIPUNCTURE: CPT | Performed by: FAMILY MEDICINE

## 2024-02-29 PROCEDURE — 83690 ASSAY OF LIPASE: CPT | Performed by: FAMILY MEDICINE

## 2024-02-29 PROCEDURE — 80053 COMPREHEN METABOLIC PANEL: CPT | Performed by: FAMILY MEDICINE

## 2024-02-29 NOTE — LETTER
February 29, 2024      Duane M Rachel  [address] [place] [address]        To Whom It May Concern:    Duane Ramos was seen in our clinic 02/29/24. He may return to work without restrictions.      Sincerely,        Yarely Tee MD

## 2024-03-01 ENCOUNTER — MYC MEDICAL ADVICE (OUTPATIENT)
Dept: FAMILY MEDICINE | Facility: CLINIC | Age: 29
End: 2024-03-01
Payer: COMMERCIAL

## 2024-03-01 NOTE — TELEPHONE ENCOUNTER
Routing to provider, see [facility] messages and answers to questions/symptoms below.  Patient was in ER on 2/11 for alcohol-induced acute pancreatitis.  Had follow up visit with Dr. Tee yesterday (not PCP, has never seen primary care with [place] until yesterday).  Unclear on plan, patient reporting worsening pain since visit yesterday. No fever, is nauseous but no vomiting. Rates pain 6/10. Constant pain. Not following liquid diet any longer, started eating normal foods. Tried a piece of pizza. Denies any recent alcohol use.  Lab values were rechecked yesterday, values returning and trending back to normal levels.    Please advise on next best steps. Return to ER? Or UC? Otherwise, patient asking if there is anything he can take to help or for his pain.      Georgina Huber RN  Clinical Triage/Primary Care  [facility]

## 2024-03-01 NOTE — TELEPHONE ENCOUNTER
No response after sending out [facility] triage on huddle board x 2 notifications.  Clinic office closed at 5:00 PM. No one left in building.    Additional message sent on to patient advising to be seen again in ER if worsening/increasing pain. Advised for patient to call and speak with FNA triage RNs if needed, still has questions, etc.as primary offices have now closed.      Georgina Huber RN  Clinical Triage/Primary Care  [facility]

## 2024-03-03 ENCOUNTER — HEALTH MAINTENANCE LETTER (OUTPATIENT)
Age: 29
End: 2024-03-03

## 2024-03-11 ENCOUNTER — ANCILLARY PROCEDURE (OUTPATIENT)
Dept: CT IMAGING | Facility: CLINIC | Age: 29
End: 2024-03-11
Attending: FAMILY MEDICINE
Payer: COMMERCIAL

## 2024-03-11 DIAGNOSIS — K85.20 ALCOHOL-INDUCED ACUTE PANCREATITIS WITHOUT INFECTION OR NECROSIS: ICD-10-CM

## 2024-03-11 PROCEDURE — 74177 CT ABD & PELVIS W/CONTRAST: CPT | Mod: TC | Performed by: RADIOLOGY

## 2024-03-11 RX ORDER — IOPAMIDOL 755 MG/ML
200 INJECTION, SOLUTION INTRAVASCULAR ONCE
Status: COMPLETED | OUTPATIENT
Start: 2024-03-11 | End: 2024-03-11

## 2024-03-11 RX ADMIN — Medication 73 ML: at 18:20

## 2024-03-11 RX ADMIN — IOPAMIDOL 96 ML: 755 INJECTION, SOLUTION INTRAVASCULAR at 18:20

## 2024-03-12 ENCOUNTER — TELEPHONE (OUTPATIENT)
Dept: GASTROENTEROLOGY | Facility: CLINIC | Age: 29
End: 2024-03-12
Payer: COMMERCIAL

## 2024-03-12 ENCOUNTER — MYC MEDICAL ADVICE (OUTPATIENT)
Dept: FAMILY MEDICINE | Facility: CLINIC | Age: 29
End: 2024-03-12
Payer: COMMERCIAL

## 2024-03-12 DIAGNOSIS — N20.0 KIDNEY STONE: Primary | ICD-10-CM

## 2024-03-12 NOTE — TELEPHONE ENCOUNTER
Advanced [facility]oscopy     Referring provider: Yarely Tee MD     Referred to: [facility]     Provider Requested: na     Referral Received: 03/12/24    Records received: Epic     Images received: PACS    Insurance Coverage:[facility]    Evaluation for:   acute pancreatitis, fu scan shows necrosis and loculated evette-pancreatic fluid compressing the splenic vein without thrombosis.     Clinical History (per RN review):     Admission for acute pancreatitis @ [facility] in February 2024.   Alcohol-induced acute pancreatitis without infection or necrosis  Alcohol use disorder    States he drinks nearly daily and has 2-3 shots, with more on weekends. Last drink 0200 PTA. Woke up with severe epigastric pain 10/10. Arrived pale with frequent episodes of non-bloody emesis.     On admission, lipase >3,000. AST 85. . WBC 24.9, likely reactionary. Afebrile. Alcohol level 0.1. UA non-infectious. CT showed acute interstitial pancreatitis, hepatic steatosis, small non-obstructing right renal calculi  - LR 150ml/hr  - Recommend alcohol cessation  - Patient declines chemical dependency  - Patient tolerating full liquid diet at time of discharge    Follow up CT w/ concern for fluid collection compressing splenic vein (prior imaging report states no concern for fluid collections)    Narrative & Impression   EXAM: CT ABDOMEN PELVIS W CONTRAST  LOCATION: [facility]  DATE: 3/11/2024     INDICATION: Follow-up alcohol induced pancreatitis.  COMPARISON: 02/11/2024 and 1/17/2017  TECHNIQUE: CT scan of the abdomen and pelvis was performed following injection of IV contrast. Multiplanar reformats were obtained. Dose reduction techniques were used.  CONTRAST: 96mL Isovue 370     FINDINGS:   LOWER CHEST: Negative.     HEPATOBILIARY: Diffuse hepatic steatosis. Normal gallbladder and biliary system.     PANCREAS: Multiloculated low dense peripancreatic fluid collections, largest measuring 7.8 x 13.6 x  8.3 cm image 82 series 5. Smaller low-density fluid collection adjacent to pancreatic uncinate measures 3.5 x 5.1 x 6 cm. Suspect necrosis of   neck/proximal body with isolated segment of pancreatic tail image 74 series 5.     SPLEEN: Stable mild splenomegaly measuring 13.7 cm.     ADRENAL GLANDS: Normal.     KIDNEYS/BLADDER: No significant mass, stone, or hydronephrosis. Normal bladder.     BOWEL: No obstruction or inflammatory change. Previous appendectomy.     LYMPH NODES: No lymphadenopathy.     VASCULATURE: No aortoiliac aneurysm. Splenic vein is patent but narrowed adjacent to the largest pancreatic fluid collection.     PELVIC ORGANS: Small amount of free peritoneal fluid in the cul-de-sac.     MUSCULOSKELETAL: No suspicious osseous lesions                                                                      IMPRESSION:   1.  Multiloculated peripancreatic fluid collections as described above with probable necrotic segment of pancreatic neck/proximal body.  2.  Mild splenomegaly is stable.  3.  Narrowed segment of splenic vein adjacent to the largest peripancreatic fluid collection without splenic vein thrombosis       Prior CT during admission:  Narrative & Impression   EXAM: CT ABDOMEN PELVIS W CONTRAST  LOCATION: [facility]  DATE: 2/11/2024     INDICATION: Severe abdominal pain, peritonitis on exam, significant leukocytosis  COMPARISON: 11/17/2017  TECHNIQUE: CT scan of the abdomen and pelvis was performed following injection of IV contrast. Multiplanar reformats were obtained. Dose reduction techniques were used.  CONTRAST: 97mL isovue 370     FINDINGS:   LOWER CHEST: Normal.     HEPATOBILIARY: Diffuse hepatic steatosis. No radiopaque gallstones.     PANCREAS: Mild pancreatic enlargement with surrounding inflammatory/edematous changes. These extend inferiorly along the anterior pararenal spaces and into the transverse mesial colon. No contained areas of fluid accumulation with  peripheral enhancement   to suggest acute pancreatic fluid collections. Preserved glandular enhancement. No evidence for necrosis.     SPLEEN: Normal.     ADRENAL GLANDS: Normal.     KIDNEYS/BLADDER: 5 mm calculus within the interpolar region of the right kidney. 3 mm calculus within the upper pole of the right kidney. No hydronephrosis.     BOWEL: Normal caliber small bowel. Postoperative changes within the cecum. Normal caliber colon.     LYMPH NODES: Ill-defined celiac axis lymph nodes which measure less than 1 cm in diameter. These are most likely reactive.     VASCULATURE: No abdominal aortic aneurysm. Normal portal venous and splenic venous enhancement. Normal hepatic venous enhancement.     PELVIC ORGANS: Small amount of free fluid within the pelvis. Partially visualized small bilateral hydroceles.     MUSCULOSKELETAL: Normal.                                                                      IMPRESSION:   1.  Imaging features consistent with acute interstitial pancreatitis. No evidence for acute pancreatic fluid collections. No necrosis.  2.  Hepatic steatosis.  3.  Small, nonobstructing right renal calculi.       MD review date:   MD Decision for clinic consultation/Orders:            Referral updates/Patient contacted:

## 2024-03-13 NOTE — H&P (VIEW-ONLY)
"Establish care and follow up  Assessment & Plan     Alcohol-induced acute pancreatitis without infection or necrosis  Ongoing   - Lipase; Future  - Lipase  Has not had alcohol since.  Will track lipase down again.  Visit with GI 4/1, labs and CT scheduled for 3/26.  Will need surgical resection of nerotized tissue of pancreas. GI to follow spleen for splenic vein thrombosis     Reviewed previous ED notes and imaging notes and discussed with patient expected outcome.       The longitudinal plan of care for the diagnosis(es)/condition(s) as documented were addressed during this visit. Due to the added complexity in care, I will continue to support Duane in the subsequent management and with ongoing continuity of care.        Follow up with GI     Subjective   Duane is a 28 year old, presenting for the following health issues:  RECHECK and Establish Care        3/20/2024     4:45 PM   Additional Questions   Roomed by Eliud Kline MA   Accompanied by Self     History of Present Illness       Reason for visit:  Talk about primary care provider    He eats 0-1 servings of fruits and vegetables daily.He consumes 1 sweetened beverage(s) daily.He exercises with enough effort to increase his heart rate 9 or less minutes per day.  He exercises with enough effort to increase his heart rate 3 or less days per week.   He is taking medications regularly.       Review of Systems  Constitutional, neuro, ENT, endocrine, pulmonary, cardiac, gastrointestinal, genitourinary, musculoskeletal, integument and psychiatric systems are negative, except as otherwise noted.      Objective    /83   Pulse 74   Temp 97.5  F (36.4  C) (Tympanic)   Resp 16   Ht 1.702 m (5' 7\")   Wt 82.1 kg (181 lb)   SpO2 98%   BMI 28.35 kg/m    Body mass index is 28.35 kg/m .      Physical Exam   GENERAL: alert and no distress  EYES: Eyes grossly normal to inspection, PERRL and conjunctivae and sclerae normal  NECK: no adenopathy, no asymmetry, masses, or " scars  RESP: lungs clear to auscultation - no rales, rhonchi or wheezes  CV: regular rate and rhythm, normal S1 S2, no S3 or S4, no murmur, click or rub, no peripheral edema  ABDOMEN: soft, nontender, no hepatosplenomegaly, no masses and bowel sounds normal  MS: no gross musculoskeletal defects noted, no edema  SKIN: no suspicious lesions or rashes  PSYCH: mentation appears normal, affect normal/bright        Signed Electronically by: Rogelio Mcdonough PA-C

## 2024-03-14 NOTE — TELEPHONE ENCOUNTER
Referral for GI is in the system.   Not sure why patient isn't able to schedule an appointment.   Routing to referral team to advise if anything is needed from provider.    Collette Barrera BSN, RN

## 2024-03-19 ENCOUNTER — PATIENT OUTREACH (OUTPATIENT)
Dept: GASTROENTEROLOGY | Facility: CLINIC | Age: 29
End: 2024-03-19
Payer: COMMERCIAL

## 2024-03-19 ENCOUNTER — PREP FOR PROCEDURE (OUTPATIENT)
Dept: GASTROENTEROLOGY | Facility: CLINIC | Age: 29
End: 2024-03-19
Payer: COMMERCIAL

## 2024-03-19 DIAGNOSIS — K85.91 NECROTIZING PANCREATITIS: ICD-10-CM

## 2024-03-19 DIAGNOSIS — K85.20 ALCOHOL-INDUCED ACUTE PANCREATITIS WITHOUT INFECTION OR NECROSIS: Primary | ICD-10-CM

## 2024-03-19 DIAGNOSIS — K86.89 ACUTE PANCREATIC FLUID COLLECTION: Primary | ICD-10-CM

## 2024-03-19 DIAGNOSIS — K86.89 ACUTE PANCREATIC FLUID COLLECTION: ICD-10-CM

## 2024-03-19 NOTE — TELEPHONE ENCOUNTER
Called pt to discuss referral and Dr Gallardo's recommendations:    1) It appears my next available clinic visit as a first overbook will be .   OK to schedule clinic visit then. Ideally in person to allow exam.     2) Labs and imaging the Thurs or Friday before clinic.   CT abdomen with contrast.   CBC, CMP, CRP, PEth, Hgb A1C, triglycerides     3) Please tentatively schedule for EUS necrosectomy in OR , which will be dependent on status at that visit. General, 45 minute endoscopic time.     4) Please arrange for virtual dietitian consultation ASAP for education in pancreatitis diet and calorie counts.     Pt was at work and had limited time to talk, but he is in agreement with in person clinic on  at 1 pm. Will have scheduling call to make CT and lab appointment next week. Orders placed.    Pt in agreement with dietician consult, order placed and message sent to scheduling for asap appt. [employer]hart message also sent with pancreatitis prevention guidelines.     Pt understands that procedure will likely follow clinic visit, though  would be next available date for procedure. Did not yet offer a procedure date, will call pt back to discuss that. Order placed and message routed to Dr Gallardo as update that  is not an option.       Procedure/Imaging/Clinic: EUS with necrosectomy  Physician: Sami  Timin24  Scope time needed:45 min  Anesthesia:General  Dx: necrotizing pancreatitis, evette pancreatic fluid collection  Tier:Tier 2 - Not life/limb threatening but potential for  patient morbidity/mortality or adverse  patient/disease outcome if  surgery/procedure not done within 30 day  Location: [facility].  Header of letter for pt communication:  Endoscopic ultrasound with necrosectomy    Comments: Referred by Dr Yarely Smith, RN, BSN,   [facility]

## 2024-03-20 ENCOUNTER — OFFICE VISIT (OUTPATIENT)
Dept: FAMILY MEDICINE | Facility: CLINIC | Age: 29
End: 2024-03-20
Payer: COMMERCIAL

## 2024-03-20 VITALS
WEIGHT: 181 LBS | HEART RATE: 74 BPM | DIASTOLIC BLOOD PRESSURE: 83 MMHG | HEIGHT: 67 IN | TEMPERATURE: 97.5 F | RESPIRATION RATE: 16 BRPM | SYSTOLIC BLOOD PRESSURE: 123 MMHG | OXYGEN SATURATION: 98 % | BODY MASS INDEX: 28.41 KG/M2

## 2024-03-20 DIAGNOSIS — K85.20 ALCOHOL-INDUCED ACUTE PANCREATITIS WITHOUT INFECTION OR NECROSIS: Primary | ICD-10-CM

## 2024-03-20 PROCEDURE — 99213 OFFICE O/P EST LOW 20 MIN: CPT | Performed by: PHYSICIAN ASSISTANT

## 2024-03-20 PROCEDURE — G2211 COMPLEX E/M VISIT ADD ON: HCPCS | Performed by: PHYSICIAN ASSISTANT

## 2024-03-20 PROCEDURE — 36415 COLL VENOUS BLD VENIPUNCTURE: CPT | Performed by: PHYSICIAN ASSISTANT

## 2024-03-20 PROCEDURE — 83690 ASSAY OF LIPASE: CPT | Performed by: PHYSICIAN ASSISTANT

## 2024-03-20 ASSESSMENT — PAIN SCALES - GENERAL: PAINLEVEL: NO PAIN (0)

## 2024-03-21 LAB — LIPASE SERPL-CCNC: 240 U/L (ref 13–60)

## 2024-03-23 ENCOUNTER — HOSPITAL ENCOUNTER (EMERGENCY)
Facility: CLINIC | Age: 29
Discharge: STILL ADMITTED OR ADMINISTRATIVE | DRG: 438 | End: 2024-03-23
Attending: EMERGENCY MEDICINE | Admitting: EMERGENCY MEDICINE
Payer: COMMERCIAL

## 2024-03-23 ENCOUNTER — APPOINTMENT (OUTPATIENT)
Dept: CT IMAGING | Facility: CLINIC | Age: 29
DRG: 438 | End: 2024-03-23
Attending: EMERGENCY MEDICINE
Payer: COMMERCIAL

## 2024-03-23 ENCOUNTER — HOSPITAL ENCOUNTER (INPATIENT)
Facility: CLINIC | Age: 29
LOS: 4 days | Discharge: HOME | DRG: 438 | End: 2024-03-28
Attending: INTERNAL MEDICINE | Admitting: INTERNAL MEDICINE
Payer: COMMERCIAL

## 2024-03-23 VITALS
SYSTOLIC BLOOD PRESSURE: 116 MMHG | RESPIRATION RATE: 16 BRPM | HEART RATE: 80 BPM | HEIGHT: 67 IN | BODY MASS INDEX: 28.35 KG/M2 | DIASTOLIC BLOOD PRESSURE: 72 MMHG | TEMPERATURE: 98 F | OXYGEN SATURATION: 97 %

## 2024-03-23 DIAGNOSIS — D62 ANEMIA DUE TO BLOOD LOSS, ACUTE: ICD-10-CM

## 2024-03-23 DIAGNOSIS — K86.89 ACUTE PANCREATIC FLUID COLLECTION: ICD-10-CM

## 2024-03-23 DIAGNOSIS — R52 PAIN: Primary | ICD-10-CM

## 2024-03-23 DIAGNOSIS — K85.90 ACUTE HEMORRHAGIC PANCREATITIS: ICD-10-CM

## 2024-03-23 DIAGNOSIS — K85.20 ALCOHOL-INDUCED ACUTE PANCREATITIS WITHOUT INFECTION OR NECROSIS: ICD-10-CM

## 2024-03-23 LAB
ABO/RH(D): NORMAL
ALBUMIN SERPL BCG-MCNC: 4.3 G/DL (ref 3.5–5.2)
ALP SERPL-CCNC: 79 U/L (ref 40–150)
ALT SERPL W P-5'-P-CCNC: 26 U/L (ref 0–70)
ANION GAP SERPL CALCULATED.3IONS-SCNC: 15 MMOL/L (ref 7–15)
ANTIBODY SCREEN: NEGATIVE
AST SERPL W P-5'-P-CCNC: 17 U/L (ref 0–45)
BASOPHILS # BLD AUTO: 0.1 10E3/UL (ref 0–0.2)
BASOPHILS NFR BLD AUTO: 0 %
BILIRUB SERPL-MCNC: 0.6 MG/DL
BUN SERPL-MCNC: 19.1 MG/DL (ref 6–20)
CALCIUM SERPL-MCNC: 9.7 MG/DL (ref 8.6–10)
CHLORIDE SERPL-SCNC: 100 MMOL/L (ref 98–107)
CREAT SERPL-MCNC: 0.94 MG/DL (ref 0.67–1.17)
DEPRECATED HCO3 PLAS-SCNC: 24 MMOL/L (ref 22–29)
EGFRCR SERPLBLD CKD-EPI 2021: >90 ML/MIN/1.73M2
EOSINOPHIL # BLD AUTO: 0 10E3/UL (ref 0–0.7)
EOSINOPHIL NFR BLD AUTO: 0 %
ERYTHROCYTE [DISTWIDTH] IN BLOOD BY AUTOMATED COUNT: 12.3 % (ref 10–15)
GLUCOSE BLDC GLUCOMTR-MCNC: 157 MG/DL (ref 70–99)
GLUCOSE SERPL-MCNC: 179 MG/DL (ref 70–99)
HCT VFR BLD AUTO: 31.9 % (ref 40–53)
HGB BLD-MCNC: 10.5 G/DL (ref 13.3–17.7)
HOLD SPECIMEN: NORMAL
IMM GRANULOCYTES # BLD: 0.1 10E3/UL
IMM GRANULOCYTES NFR BLD: 0 %
LACTATE SERPL-SCNC: 1.8 MMOL/L (ref 0.7–2)
LIPASE SERPL-CCNC: 884 U/L (ref 13–60)
LYMPHOCYTES # BLD AUTO: 2.2 10E3/UL (ref 0.8–5.3)
LYMPHOCYTES NFR BLD AUTO: 11 %
MCH RBC QN AUTO: 28.5 PG (ref 26.5–33)
MCHC RBC AUTO-ENTMCNC: 32.9 G/DL (ref 31.5–36.5)
MCV RBC AUTO: 87 FL (ref 78–100)
MONOCYTES # BLD AUTO: 0.8 10E3/UL (ref 0–1.3)
MONOCYTES NFR BLD AUTO: 4 %
NEUTROPHILS # BLD AUTO: 16.6 10E3/UL (ref 1.6–8.3)
NEUTROPHILS NFR BLD AUTO: 84 %
NRBC # BLD AUTO: 0 10E3/UL
NRBC BLD AUTO-RTO: 0 /100
PLATELET # BLD AUTO: 346 10E3/UL (ref 150–450)
POTASSIUM SERPL-SCNC: 3.8 MMOL/L (ref 3.4–5.3)
PROT SERPL-MCNC: 6.8 G/DL (ref 6.4–8.3)
RADIOLOGIST FLAGS: ABNORMAL
RBC # BLD AUTO: 3.68 10E6/UL (ref 4.4–5.9)
SODIUM SERPL-SCNC: 139 MMOL/L (ref 135–145)
SPECIMEN EXPIRATION DATE: NORMAL
WBC # BLD AUTO: 19.8 10E3/UL (ref 4–11)

## 2024-03-23 PROCEDURE — 96374 THER/PROPH/DIAG INJ IV PUSH: CPT | Mod: 59 | Performed by: EMERGENCY MEDICINE

## 2024-03-23 PROCEDURE — 250N000011 HC RX IP 250 OP 636: Performed by: EMERGENCY MEDICINE

## 2024-03-23 PROCEDURE — 250N000009 HC RX 250: Performed by: EMERGENCY MEDICINE

## 2024-03-23 PROCEDURE — 83690 ASSAY OF LIPASE: CPT | Performed by: EMERGENCY MEDICINE

## 2024-03-23 PROCEDURE — 250N000011 HC RX IP 250 OP 636: Performed by: INTERNAL MEDICINE

## 2024-03-23 PROCEDURE — 80053 COMPREHEN METABOLIC PANEL: CPT | Performed by: EMERGENCY MEDICINE

## 2024-03-23 PROCEDURE — 96376 TX/PRO/DX INJ SAME DRUG ADON: CPT | Mod: 59 | Performed by: EMERGENCY MEDICINE

## 2024-03-23 PROCEDURE — 82962 GLUCOSE BLOOD TEST: CPT

## 2024-03-23 PROCEDURE — 93005 ELECTROCARDIOGRAM TRACING: CPT | Performed by: EMERGENCY MEDICINE

## 2024-03-23 PROCEDURE — 86923 COMPATIBILITY TEST ELECTRIC: CPT | Performed by: INTERNAL MEDICINE

## 2024-03-23 PROCEDURE — 258N000003 HC RX IP 258 OP 636: Performed by: EMERGENCY MEDICINE

## 2024-03-23 PROCEDURE — 85025 COMPLETE CBC W/AUTO DIFF WBC: CPT | Performed by: EMERGENCY MEDICINE

## 2024-03-23 PROCEDURE — 36415 COLL VENOUS BLD VENIPUNCTURE: CPT | Performed by: EMERGENCY MEDICINE

## 2024-03-23 PROCEDURE — 99223 1ST HOSP IP/OBS HIGH 75: CPT | Mod: AI | Performed by: INTERNAL MEDICINE

## 2024-03-23 PROCEDURE — 99291 CRITICAL CARE FIRST HOUR: CPT | Mod: 25 | Performed by: EMERGENCY MEDICINE

## 2024-03-23 PROCEDURE — 86900 BLOOD TYPING SEROLOGIC ABO: CPT | Performed by: EMERGENCY MEDICINE

## 2024-03-23 PROCEDURE — 93010 ELECTROCARDIOGRAM REPORT: CPT | Performed by: EMERGENCY MEDICINE

## 2024-03-23 PROCEDURE — 83605 ASSAY OF LACTIC ACID: CPT | Performed by: EMERGENCY MEDICINE

## 2024-03-23 PROCEDURE — 74177 CT ABD & PELVIS W/CONTRAST: CPT

## 2024-03-23 RX ORDER — NALOXONE HYDROCHLORIDE 0.4 MG/ML
0.4 INJECTION, SOLUTION INTRAMUSCULAR; INTRAVENOUS; SUBCUTANEOUS
Status: DISCONTINUED | OUTPATIENT
Start: 2024-03-23 | End: 2024-03-28 | Stop reason: HOSPADM

## 2024-03-23 RX ORDER — NALOXONE HYDROCHLORIDE 0.4 MG/ML
0.2 INJECTION, SOLUTION INTRAMUSCULAR; INTRAVENOUS; SUBCUTANEOUS
Status: DISCONTINUED | OUTPATIENT
Start: 2024-03-23 | End: 2024-03-28 | Stop reason: HOSPADM

## 2024-03-23 RX ORDER — HYDROMORPHONE HYDROCHLORIDE 1 MG/ML
0.3 INJECTION, SOLUTION INTRAMUSCULAR; INTRAVENOUS; SUBCUTANEOUS
Status: DISCONTINUED | OUTPATIENT
Start: 2024-03-23 | End: 2024-03-28 | Stop reason: HOSPADM

## 2024-03-23 RX ORDER — OXYCODONE HYDROCHLORIDE 5 MG/1
5 TABLET ORAL EVERY 4 HOURS PRN
Status: DISCONTINUED | OUTPATIENT
Start: 2024-03-23 | End: 2024-03-28 | Stop reason: HOSPADM

## 2024-03-23 RX ORDER — FLUMAZENIL 0.1 MG/ML
0.2 INJECTION, SOLUTION INTRAVENOUS
Status: DISCONTINUED | OUTPATIENT
Start: 2024-03-23 | End: 2024-03-26

## 2024-03-23 RX ORDER — IOPAMIDOL 755 MG/ML
89 INJECTION, SOLUTION INTRAVASCULAR ONCE
Status: COMPLETED | OUTPATIENT
Start: 2024-03-23 | End: 2024-03-23

## 2024-03-23 RX ORDER — HEPARIN SODIUM 200 [USP'U]/100ML
1 INJECTION, SOLUTION INTRAVENOUS CONTINUOUS PRN
Status: DISCONTINUED | OUTPATIENT
Start: 2024-03-23 | End: 2024-03-26

## 2024-03-23 RX ORDER — NALOXONE HYDROCHLORIDE 0.4 MG/ML
0.4 INJECTION, SOLUTION INTRAMUSCULAR; INTRAVENOUS; SUBCUTANEOUS
Status: DISCONTINUED | OUTPATIENT
Start: 2024-03-23 | End: 2024-03-24

## 2024-03-23 RX ORDER — SODIUM CHLORIDE 9 MG/ML
INJECTION, SOLUTION INTRAVENOUS CONTINUOUS
Status: DISCONTINUED | OUTPATIENT
Start: 2024-03-24 | End: 2024-03-26

## 2024-03-23 RX ORDER — FENTANYL CITRATE 50 UG/ML
25-50 INJECTION, SOLUTION INTRAMUSCULAR; INTRAVENOUS EVERY 5 MIN PRN
Status: DISCONTINUED | OUTPATIENT
Start: 2024-03-23 | End: 2024-03-26

## 2024-03-23 RX ORDER — NALOXONE HYDROCHLORIDE 0.4 MG/ML
0.2 INJECTION, SOLUTION INTRAMUSCULAR; INTRAVENOUS; SUBCUTANEOUS
Status: DISCONTINUED | OUTPATIENT
Start: 2024-03-23 | End: 2024-03-24

## 2024-03-23 RX ORDER — OXYCODONE HYDROCHLORIDE 5 MG/1
10 TABLET ORAL EVERY 4 HOURS PRN
Status: DISCONTINUED | OUTPATIENT
Start: 2024-03-23 | End: 2024-03-28 | Stop reason: HOSPADM

## 2024-03-23 RX ORDER — HYDROMORPHONE HYDROCHLORIDE 1 MG/ML
0.5 INJECTION, SOLUTION INTRAMUSCULAR; INTRAVENOUS; SUBCUTANEOUS
Status: DISCONTINUED | OUTPATIENT
Start: 2024-03-23 | End: 2024-03-28 | Stop reason: HOSPADM

## 2024-03-23 RX ORDER — FENTANYL CITRATE 50 UG/ML
50 INJECTION, SOLUTION INTRAMUSCULAR; INTRAVENOUS EVERY 5 MIN PRN
Status: DISCONTINUED | OUTPATIENT
Start: 2024-03-23 | End: 2024-03-23 | Stop reason: HOSPADM

## 2024-03-23 RX ORDER — DEXTROSE, SODIUM CHLORIDE, SODIUM LACTATE, POTASSIUM CHLORIDE, AND CALCIUM CHLORIDE 5; .6; .31; .03; .02 G/100ML; G/100ML; G/100ML; G/100ML; G/100ML
INJECTION, SOLUTION INTRAVENOUS CONTINUOUS
Status: DISCONTINUED | OUTPATIENT
Start: 2024-03-23 | End: 2024-03-23 | Stop reason: HOSPADM

## 2024-03-23 RX ADMIN — FENTANYL CITRATE 50 MCG: 50 INJECTION INTRAMUSCULAR; INTRAVENOUS at 20:13

## 2024-03-23 RX ADMIN — SODIUM CHLORIDE, POTASSIUM CHLORIDE, SODIUM LACTATE AND CALCIUM CHLORIDE 1000 ML: 600; 310; 30; 20 INJECTION, SOLUTION INTRAVENOUS at 20:15

## 2024-03-23 RX ADMIN — HYDROMORPHONE HYDROCHLORIDE 0.5 MG: 1 INJECTION, SOLUTION INTRAMUSCULAR; INTRAVENOUS; SUBCUTANEOUS at 23:50

## 2024-03-23 RX ADMIN — FENTANYL CITRATE 50 MCG: 50 INJECTION INTRAMUSCULAR; INTRAVENOUS at 21:26

## 2024-03-23 RX ADMIN — IOPAMIDOL 89 ML: 755 INJECTION, SOLUTION INTRAVENOUS at 20:26

## 2024-03-23 RX ADMIN — SODIUM CHLORIDE 62 ML: 9 INJECTION, SOLUTION INTRAVENOUS at 20:27

## 2024-03-23 RX ADMIN — SODIUM CHLORIDE, SODIUM LACTATE, POTASSIUM CHLORIDE, CALCIUM CHLORIDE AND DEXTROSE MONOHYDRATE: 5; 600; 310; 30; 20 INJECTION, SOLUTION INTRAVENOUS at 22:02

## 2024-03-23 ASSESSMENT — ENCOUNTER SYMPTOMS
UNEXPECTED WEIGHT CHANGE: 1
WOUND: 0
LIGHT-HEADEDNESS: 1
NECK STIFFNESS: 0
SHORTNESS OF BREATH: 0
COUGH: 0
NAUSEA: 1
SEIZURES: 0
NECK PAIN: 0
ABDOMINAL PAIN: 1
SORE THROAT: 0
HEADACHES: 0
WEAKNESS: 0
SPEECH DIFFICULTY: 0
FEVER: 0
VOMITING: 1
CONFUSION: 0
DIARRHEA: 0
APPETITE CHANGE: 1
CHEST TIGHTNESS: 0

## 2024-03-23 ASSESSMENT — ACTIVITIES OF DAILY LIVING (ADL)
ADLS_ACUITY_SCORE: 35

## 2024-03-23 ASSESSMENT — COLUMBIA-SUICIDE SEVERITY RATING SCALE - C-SSRS
6. HAVE YOU EVER DONE ANYTHING, STARTED TO DO ANYTHING, OR PREPARED TO DO ANYTHING TO END YOUR LIFE?: NO
2. HAVE YOU ACTUALLY HAD ANY THOUGHTS OF KILLING YOURSELF IN THE PAST MONTH?: NO
1. IN THE PAST MONTH, HAVE YOU WISHED YOU WERE DEAD OR WISHED YOU COULD GO TO SLEEP AND NOT WAKE UP?: NO

## 2024-03-24 ENCOUNTER — APPOINTMENT (OUTPATIENT)
Dept: INTERVENTIONAL RADIOLOGY/VASCULAR | Facility: CLINIC | Age: 29
DRG: 438 | End: 2024-03-24
Attending: RADIOLOGY
Payer: COMMERCIAL

## 2024-03-24 PROBLEM — K86.89: Status: ACTIVE | Noted: 2024-03-24

## 2024-03-24 LAB
ALBUMIN SERPL BCG-MCNC: 3.8 G/DL (ref 3.5–5.2)
ALP SERPL-CCNC: 63 U/L (ref 40–150)
ALT SERPL W P-5'-P-CCNC: 21 U/L (ref 0–70)
ANION GAP SERPL CALCULATED.3IONS-SCNC: 14 MMOL/L (ref 7–15)
APTT PPP: 31 SECONDS (ref 22–38)
AST SERPL W P-5'-P-CCNC: 14 U/L (ref 0–45)
BILIRUB SERPL-MCNC: 0.9 MG/DL
BLD PROD TYP BPU: NORMAL
BLD PROD TYP BPU: NORMAL
BLOOD COMPONENT TYPE: NORMAL
BLOOD COMPONENT TYPE: NORMAL
BUN SERPL-MCNC: 14.9 MG/DL (ref 6–20)
CALCIUM SERPL-MCNC: 8.7 MG/DL (ref 8.6–10)
CHLORIDE SERPL-SCNC: 103 MMOL/L (ref 98–107)
CODING SYSTEM: NORMAL
CODING SYSTEM: NORMAL
CREAT SERPL-MCNC: 0.88 MG/DL (ref 0.67–1.17)
CROSSMATCH: NORMAL
CROSSMATCH: NORMAL
DEPRECATED HCO3 PLAS-SCNC: 22 MMOL/L (ref 22–29)
EGFRCR SERPLBLD CKD-EPI 2021: >90 ML/MIN/1.73M2
ERYTHROCYTE [DISTWIDTH] IN BLOOD BY AUTOMATED COUNT: 12.3 % (ref 10–15)
FIBRINOGEN PPP-MCNC: 320 MG/DL (ref 170–490)
GLUCOSE SERPL-MCNC: 126 MG/DL (ref 70–99)
HCT VFR BLD AUTO: 24.8 % (ref 40–53)
HGB BLD-MCNC: 7.7 G/DL (ref 13.3–17.7)
HGB BLD-MCNC: 7.9 G/DL (ref 13.3–17.7)
HGB BLD-MCNC: 8.2 G/DL (ref 13.3–17.7)
HGB BLD-MCNC: 8.4 G/DL (ref 13.3–17.7)
HGB BLD-MCNC: 8.5 G/DL (ref 13.3–17.7)
HGB BLD-MCNC: 8.6 G/DL (ref 13.3–17.7)
INR PPP: 1.23 (ref 0.85–1.15)
ISSUE DATE AND TIME: NORMAL
ISSUE DATE AND TIME: NORMAL
MCH RBC QN AUTO: 28 PG (ref 26.5–33)
MCHC RBC AUTO-ENTMCNC: 31.9 G/DL (ref 31.5–36.5)
MCV RBC AUTO: 88 FL (ref 78–100)
PLATELET # BLD AUTO: 168 10E3/UL (ref 150–450)
POTASSIUM SERPL-SCNC: 4.1 MMOL/L (ref 3.4–5.3)
PROCALCITONIN SERPL IA-MCNC: 0.16 NG/ML
PROT SERPL-MCNC: 5.8 G/DL (ref 6.4–8.3)
RBC # BLD AUTO: 2.82 10E6/UL (ref 4.4–5.9)
SODIUM SERPL-SCNC: 139 MMOL/L (ref 135–145)
UNIT ABO/RH: NORMAL
UNIT ABO/RH: NORMAL
UNIT NUMBER: NORMAL
UNIT NUMBER: NORMAL
UNIT STATUS: NORMAL
UNIT STATUS: NORMAL
UNIT TYPE ISBT: 5100
UNIT TYPE ISBT: 5100
WBC # BLD AUTO: 7.2 10E3/UL (ref 4–11)

## 2024-03-24 PROCEDURE — C9113 INJ PANTOPRAZOLE SODIUM, VIA: HCPCS | Performed by: INTERNAL MEDICINE

## 2024-03-24 PROCEDURE — 255N000002 HC RX 255 OP 636: Performed by: RADIOLOGY

## 2024-03-24 PROCEDURE — 272N000196 HC ACCESSORY CR5

## 2024-03-24 PROCEDURE — 36245 INS CATH ABD/L-EXT ART 1ST: CPT

## 2024-03-24 PROCEDURE — 85018 HEMOGLOBIN: CPT | Performed by: HOSPITALIST

## 2024-03-24 PROCEDURE — 85018 HEMOGLOBIN: CPT | Performed by: INTERNAL MEDICINE

## 2024-03-24 PROCEDURE — 36415 COLL VENOUS BLD VENIPUNCTURE: CPT | Performed by: HOSPITALIST

## 2024-03-24 PROCEDURE — C1769 GUIDE WIRE: HCPCS

## 2024-03-24 PROCEDURE — 36415 COLL VENOUS BLD VENIPUNCTURE: CPT | Performed by: INTERNAL MEDICINE

## 2024-03-24 PROCEDURE — 84145 PROCALCITONIN (PCT): CPT | Performed by: INTERNAL MEDICINE

## 2024-03-24 PROCEDURE — 258N000003 HC RX IP 258 OP 636: Performed by: INTERNAL MEDICINE

## 2024-03-24 PROCEDURE — 250N000011 HC RX IP 250 OP 636: Performed by: RADIOLOGY

## 2024-03-24 PROCEDURE — 272N000116 HC CATH CR1

## 2024-03-24 PROCEDURE — 85730 THROMBOPLASTIN TIME PARTIAL: CPT | Performed by: HOSPITALIST

## 2024-03-24 PROCEDURE — 99233 SBSQ HOSP IP/OBS HIGH 50: CPT | Performed by: HOSPITALIST

## 2024-03-24 PROCEDURE — B4131ZZ FLUOROSCOPY OF SPLENIC ARTERIES USING LOW OSMOLAR CONTRAST: ICD-10-PCS | Performed by: RADIOLOGY

## 2024-03-24 PROCEDURE — 250N000011 HC RX IP 250 OP 636: Performed by: INTERNAL MEDICINE

## 2024-03-24 PROCEDURE — 272N000127 HC CATH CR16

## 2024-03-24 PROCEDURE — 75726 ARTERY X-RAYS ABDOMEN: CPT

## 2024-03-24 PROCEDURE — 120N000001 HC R&B MED SURG/OB

## 2024-03-24 PROCEDURE — P9016 RBC LEUKOCYTES REDUCED: HCPCS | Performed by: INTERNAL MEDICINE

## 2024-03-24 PROCEDURE — 80053 COMPREHEN METABOLIC PANEL: CPT | Performed by: INTERNAL MEDICINE

## 2024-03-24 PROCEDURE — 99221 1ST HOSP IP/OBS SF/LOW 40: CPT | Performed by: STUDENT IN AN ORGANIZED HEALTH CARE EDUCATION/TRAINING PROGRAM

## 2024-03-24 PROCEDURE — 272N000567 HC SHEATH CR4

## 2024-03-24 PROCEDURE — 85384 FIBRINOGEN ACTIVITY: CPT | Performed by: HOSPITALIST

## 2024-03-24 PROCEDURE — 85610 PROTHROMBIN TIME: CPT | Performed by: HOSPITALIST

## 2024-03-24 PROCEDURE — 258N000003 HC RX IP 258 OP 636: Performed by: HOSPITALIST

## 2024-03-24 PROCEDURE — 250N000013 HC RX MED GY IP 250 OP 250 PS 637: Performed by: INTERNAL MEDICINE

## 2024-03-24 PROCEDURE — 250N000009 HC RX 250: Performed by: RADIOLOGY

## 2024-03-24 RX ORDER — IOPAMIDOL 612 MG/ML
100 INJECTION, SOLUTION INTRAVASCULAR ONCE
Status: COMPLETED | OUTPATIENT
Start: 2024-03-24 | End: 2024-03-24

## 2024-03-24 RX ORDER — LIDOCAINE 40 MG/G
CREAM TOPICAL
Status: DISCONTINUED | OUTPATIENT
Start: 2024-03-24 | End: 2024-03-28 | Stop reason: HOSPADM

## 2024-03-24 RX ORDER — AMOXICILLIN 250 MG
2 CAPSULE ORAL 2 TIMES DAILY PRN
Status: DISCONTINUED | OUTPATIENT
Start: 2024-03-24 | End: 2024-03-28 | Stop reason: HOSPADM

## 2024-03-24 RX ORDER — ACETAMINOPHEN 650 MG/1
650 SUPPOSITORY RECTAL EVERY 4 HOURS PRN
Status: DISCONTINUED | OUTPATIENT
Start: 2024-03-24 | End: 2024-03-28 | Stop reason: HOSPADM

## 2024-03-24 RX ORDER — ACETAMINOPHEN 325 MG/1
650 TABLET ORAL EVERY 4 HOURS PRN
Status: DISCONTINUED | OUTPATIENT
Start: 2024-03-24 | End: 2024-03-28 | Stop reason: HOSPADM

## 2024-03-24 RX ORDER — LIDOCAINE 40 MG/G
CREAM TOPICAL
Status: DISCONTINUED | OUTPATIENT
Start: 2024-03-24 | End: 2024-03-24

## 2024-03-24 RX ORDER — PIPERACILLIN SODIUM, TAZOBACTAM SODIUM 3; .375 G/15ML; G/15ML
3.38 INJECTION, POWDER, LYOPHILIZED, FOR SOLUTION INTRAVENOUS EVERY 6 HOURS
Status: DISCONTINUED | OUTPATIENT
Start: 2024-03-24 | End: 2024-03-24

## 2024-03-24 RX ORDER — AMOXICILLIN 250 MG
1 CAPSULE ORAL 2 TIMES DAILY PRN
Status: DISCONTINUED | OUTPATIENT
Start: 2024-03-24 | End: 2024-03-28 | Stop reason: HOSPADM

## 2024-03-24 RX ORDER — ONDANSETRON 4 MG/1
4 TABLET, ORALLY DISINTEGRATING ORAL EVERY 6 HOURS PRN
Status: DISCONTINUED | OUTPATIENT
Start: 2024-03-24 | End: 2024-03-28 | Stop reason: HOSPADM

## 2024-03-24 RX ORDER — CALCIUM CARBONATE 500 MG/1
1000 TABLET, CHEWABLE ORAL 4 TIMES DAILY PRN
Status: DISCONTINUED | OUTPATIENT
Start: 2024-03-24 | End: 2024-03-28 | Stop reason: HOSPADM

## 2024-03-24 RX ORDER — ONDANSETRON 2 MG/ML
4 INJECTION INTRAMUSCULAR; INTRAVENOUS EVERY 6 HOURS PRN
Status: DISCONTINUED | OUTPATIENT
Start: 2024-03-24 | End: 2024-03-28 | Stop reason: HOSPADM

## 2024-03-24 RX ADMIN — HYDROMORPHONE HYDROCHLORIDE 0.5 MG: 1 INJECTION, SOLUTION INTRAMUSCULAR; INTRAVENOUS; SUBCUTANEOUS at 02:08

## 2024-03-24 RX ADMIN — OXYCODONE HYDROCHLORIDE 10 MG: 5 TABLET ORAL at 20:20

## 2024-03-24 RX ADMIN — OXYCODONE HYDROCHLORIDE 10 MG: 5 TABLET ORAL at 07:26

## 2024-03-24 RX ADMIN — ACETAMINOPHEN 650 MG: 325 TABLET, FILM COATED ORAL at 15:59

## 2024-03-24 RX ADMIN — MIDAZOLAM 0.5 MG: 1 INJECTION INTRAMUSCULAR; INTRAVENOUS at 01:09

## 2024-03-24 RX ADMIN — PIPERACILLIN AND TAZOBACTAM 3.38 G: 3; .375 INJECTION, POWDER, FOR SOLUTION INTRAVENOUS at 02:16

## 2024-03-24 RX ADMIN — HYDROMORPHONE HYDROCHLORIDE 0.5 MG: 1 INJECTION, SOLUTION INTRAMUSCULAR; INTRAVENOUS; SUBCUTANEOUS at 06:07

## 2024-03-24 RX ADMIN — HEPARIN SODIUM 5 BAG: 200 INJECTION, SOLUTION INTRAVENOUS at 01:10

## 2024-03-24 RX ADMIN — MIDAZOLAM 1 MG: 1 INJECTION INTRAMUSCULAR; INTRAVENOUS at 00:52

## 2024-03-24 RX ADMIN — PANTOPRAZOLE SODIUM 40 MG: 40 INJECTION, POWDER, FOR SOLUTION INTRAVENOUS at 20:20

## 2024-03-24 RX ADMIN — FENTANYL CITRATE 50 MCG: 50 INJECTION, SOLUTION INTRAMUSCULAR; INTRAVENOUS at 00:52

## 2024-03-24 RX ADMIN — OXYCODONE HYDROCHLORIDE 10 MG: 5 TABLET ORAL at 03:04

## 2024-03-24 RX ADMIN — ACETAMINOPHEN 650 MG: 325 TABLET, FILM COATED ORAL at 20:20

## 2024-03-24 RX ADMIN — HYDROMORPHONE HYDROCHLORIDE 0.5 MG: 1 INJECTION, SOLUTION INTRAMUSCULAR; INTRAVENOUS; SUBCUTANEOUS at 04:14

## 2024-03-24 RX ADMIN — SODIUM CHLORIDE: 9 INJECTION, SOLUTION INTRAVENOUS at 00:16

## 2024-03-24 RX ADMIN — PANTOPRAZOLE SODIUM 40 MG: 40 INJECTION, POWDER, FOR SOLUTION INTRAVENOUS at 10:18

## 2024-03-24 RX ADMIN — ACETAMINOPHEN 650 MG: 325 TABLET, FILM COATED ORAL at 12:00

## 2024-03-24 RX ADMIN — IOPAMIDOL 60 ML: 612 INJECTION, SOLUTION INTRAVENOUS at 01:19

## 2024-03-24 RX ADMIN — FENTANYL CITRATE 25 MCG: 50 INJECTION, SOLUTION INTRAMUSCULAR; INTRAVENOUS at 01:11

## 2024-03-24 RX ADMIN — FENTANYL CITRATE 50 MCG: 50 INJECTION, SOLUTION INTRAMUSCULAR; INTRAVENOUS at 00:59

## 2024-03-24 RX ADMIN — OXYCODONE HYDROCHLORIDE 10 MG: 5 TABLET ORAL at 11:23

## 2024-03-24 RX ADMIN — SODIUM CHLORIDE: 9 INJECTION, SOLUTION INTRAVENOUS at 20:23

## 2024-03-24 RX ADMIN — LIDOCAINE HYDROCHLORIDE 8 ML: 10 INJECTION, SOLUTION INFILTRATION; PERINEURAL at 01:00

## 2024-03-24 RX ADMIN — MIDAZOLAM 1 MG: 1 INJECTION INTRAMUSCULAR; INTRAVENOUS at 00:59

## 2024-03-24 RX ADMIN — OXYCODONE HYDROCHLORIDE 10 MG: 5 TABLET ORAL at 15:29

## 2024-03-24 ASSESSMENT — ACTIVITIES OF DAILY LIVING (ADL)
ADLS_ACUITY_SCORE: 37
ADLS_ACUITY_SCORE: 21
ADLS_ACUITY_SCORE: 41
ADLS_ACUITY_SCORE: 37
ADLS_ACUITY_SCORE: 38
ADLS_ACUITY_SCORE: 36
ADLS_ACUITY_SCORE: 37
ADLS_ACUITY_SCORE: 41
ADLS_ACUITY_SCORE: 38
ADLS_ACUITY_SCORE: 41
ADLS_ACUITY_SCORE: 36
ADLS_ACUITY_SCORE: 21
ADLS_ACUITY_SCORE: 37
ADLS_ACUITY_SCORE: 21
ADLS_ACUITY_SCORE: 41
ADLS_ACUITY_SCORE: 38
ADLS_ACUITY_SCORE: 37
ADLS_ACUITY_SCORE: 21
ADLS_ACUITY_SCORE: 38
ADLS_ACUITY_SCORE: 38
ADLS_ACUITY_SCORE: 21
ADLS_ACUITY_SCORE: 41
ADLS_ACUITY_SCORE: 41

## 2024-03-24 NOTE — ED NOTES
MD in to speak with pt and SO.   Patient Placement on phone currently regarding transfer.  Pt aware and I will update them asap.  Pt remain VSS, pain 7/10.   MD aware.

## 2024-03-24 NOTE — PROCEDURES
[facility]    Procedure: IR Procedure Note    Date/Time: 3/24/2024 1:23 AM    Performed by: Ezio Morrow MD  Authorized by: zEio Morrow MD      UNIVERSAL PROTOCOL   Site Marked: NA  Prior Images Obtained and Reviewed:  Yes  Required items: Required blood products, implants, devices and special equipment available    Patient identity confirmed:  Verbally with patient, arm band, provided demographic data and hospital-assigned identification number  Patient was reevaluated immediately before administering moderate or deep sedation or anesthesia  Confirmation Checklist:  Patient's identity using two indicators, relevant allergies, procedure was appropriate and matched the consent or emergent situation and correct equipment/implants were available  Time out: Immediately prior to the procedure a time out was called    Universal Protocol: the Joint Commission Universal Protocol was followed    Preparation: Patient was prepped and draped in usual sterile fashion       ANESTHESIA    Anesthesia:  Local infiltration  Local Anesthetic:  Lidocaine 1% without epinephrine      SEDATION  Patient Sedated: Yes    Vital signs: Vital signs monitored during sedation    See dictated procedure note for full details.  Findings: Angiogram as per PACS    Specimens: none    Complications: None    Condition: Stable    Plan: Interventional Radiology Post-Procedure Note    Procedure: Angiogram without embolization.    Attending: Ezio Morrow MD    Findings: No evidence of active arterial hemorrhage from left gastric, splenic, GDA, gastroepiploic or hepatic arteries.    Plan: Bedrest x 2 hours.      PROCEDURE    Patient Tolerance:  Patient tolerated the procedure well with no immediate complications  Length of time physician/provider present for 1:1 monitoring during sedation: 45

## 2024-03-24 NOTE — ED NOTES
Report called to receiving hospital.  EMS called.   Pt and SO aware.  Pt remains vitally stable.

## 2024-03-24 NOTE — CONSULTS
GASTROENTEROLOGY CONSULTATION      Duane Ramos  [id number] [place] [address]  28 year old male     Admission Date/Time: 3/23/2024  Primary Care Provider: Rogelio Mcdonough     We were asked to see the patient in consultation by Dr. Fleming for evaluation of intra-abdominal bleeding.    IRP:      #1 Acute pancreatitis with evolving peripancreatic necrotic collection and internal arterial bleeding with negative angiogram overnight  #2 Prior alcohol use        Likely bleeding has stopped spontaneously. High risk arterial bleeding in the setting of necrotic collection.   - suggest inpatient monitoring  - trend Hb, vitals. If evidence of brisk bleeding, repeat IR angiogram next step, consider empiric embolization in that case.   - IV fluids  - PRBC as needed.  - NPO for now.  - No role for endoscopic intervention.  - avoid heparin/lovenox.   - mechanical DVT prophylaxis.    GI team will follow.    Todd Herring MD   [facility]  [phone]      ________________________________________________________________________        HPI:  Duane Ramos is a 28 year old male who was admitted due to presyncope/fall with dizziness. Pt had acute pancreatitis about 1 month ago 2/2 alcohol. He reports being sober since. Reports ongoing moderate upper abd pain since then with some severe flare-ups. Yesterday, he had severe pain with N/V and almost fell. In the triage- 2 syncopal episodes prompting emergent CT that showed large evolving necrotic collection with arterial bleeding within. Angiogram was done but was negative. He has not had any hematemesis or melena or rectal bleeding. Pain is better now.     ROS: A comprehensive ten point review of systems was negative aside from those in mentioned in the HPI.      PAST MEDICAL HISTORY:  No past medical history on file.  SOCIAL HISTORY:  Social History     Tobacco Use    Smoking status: Former     Types: Cigarettes    Smokeless tobacco: Never   Vaping Use     "Vaping Use: Never used   Substance Use Topics    Alcohol use: Yes    Drug use: No     FAMILY HISTORY:  No family history on file.  ALLERGIES: No Known Allergies  MEDICATIONS:   Prior to Admission medications    Medication Sig Start Date End Date Taking? Authorizing Provider   ibuprofen (ADVIL/MOTRIN) 200 MG tablet Take 400 mg by mouth every 6 hours as needed for pain   Yes Reported, Patient     PHYSICAL EXAM:   /73 (BP Location: Left arm)   Pulse 96   Temp 98.5  F (36.9  C) (Axillary)   Resp 22   Ht 1.702 m (5' 7\")   Wt 83.7 kg (184 lb 8.4 oz)   SpO2 94%   BMI 28.90 kg/m     GEN: No distress, Alert, comfortable.  DAI: No pallor, No icterus, oral mucosa moist.    NECK: No thyromegaly. No mass.    HEART: RRR, S1 S2 normal.   LUNGS: CTA BL  ABD: soft, non-tender, non-distended, no peritoneal signs.  NEURO: No gross motor deficits.  PSYCH: A O X 3.  EXTREMITIES: No pedal edema.      ADDITIONAL DATA:   I reviewed the patient's new clinical lab test results.   Recent Labs   Lab Test 03/24/24  1011 03/24/24  0528 03/24/24  0010 03/23/24 2002 02/14/24  0554   WBC  --  7.2  --  19.8* 11.1*   HGB 7.9* 7.9*  7.9* 8.5* 10.5* 12.0*   MCV  --  88  --  87 90   PLT  --  168  --  346 135*   INR 1.23*  --   --   --   --      Recent Labs   Lab Test 03/24/24  0528 03/23/24 2022 03/23/24 2002 02/29/24  0820     --  139 138   POTASSIUM 4.1  --  3.8 4.5   CHLORIDE 103  --  100 101   CO2 22 -- 24 24   BUN 14.9  --  19.1 12.5   CR 0.88  --  0.94 0.96   ANIONGAP 14  --  15 13   BUZZ 8.7  --  9.7 10.0   * 157* 179* 107*     Recent Labs   Lab Test 03/24/24  0528 03/23/24 2002 03/20/24  1731 02/29/24  0820 02/12/24  0510 02/11/24 2056   ALBUMIN 3.8 4.3  --  4.4   < >  --    BILITOTAL 0.9 0.6  --  0.4   < >  --    ALT 21 26  --  50   < >  --    AST 14 17  --  34   < >  --    PROTEIN  --   --   --   --   --  Negative   LIPASE  --  884* 240* 127*   < >  --     < > = values in this interval not displayed.        I " reviewed the patient's new imaging results.    Total time: 50 minutes.

## 2024-03-24 NOTE — PRE-PROCEDURE
GENERAL PRE-PROCEDURE:   Procedure:  Angiogram    Risks and benefits: Risks, benefits and alternatives were discussed    Consent given by:  Patient  Patient states understanding of procedure being performed: Yes    Patient's understanding of procedure matches consent: Yes    Procedure consent matches procedure scheduled: Yes    Expected level of sedation:  Moderate  Appropriately NPO:  Yes  ASA Class:  2  Mallampati  :  Grade 2- soft palate, base of uvula, tonsillar pillars, and portion of posterior pharyngeal wall visible  Lungs:  Lungs clear with good breath sounds bilaterally  Heart:  Normal heart sounds and rate  History & Physical reviewed:  History and physical reviewed and no updates needed  Statement of review:  I have reviewed the lab findings, diagnostic data, medications, and the plan for sedation    
03-Apr-2022 15:21

## 2024-03-24 NOTE — ED NOTES
Pt arrived via triage, passed out in lobby x 2, falling onto table, and SO and triage RN catching pt.  Rapid response called and pt taken to room 1.  Pt awake, quiet, appears confused, and is very, very pale.  No increased Resp rate or work of breathing.  Skin cool, mosit, and weak pulse noted at a controled rate.   MD arrival and report given.  SO at bedside providing information.     Pt undressed and placed on full monitors, 16g and 18g IV's placed in R arm, labs drawn and sent.    Cardiac:  S1, S2, no MRG noted.  NSR on monitor, occasional PVC's and PAC's noted.  Lungs: CTA bilat.  No cough, pt denies SOB  Abd: Round, pain at rest 7/10, increased with the slightest of touch, no BS noted.        1 lt LR hung WO per MD orders.  Pt medicated for pain.       Pt transported to CT with RN on monitor.  Pt tolerated well.    PLAN:  Will continue to monitor closely.  Comfort and reassurance given to pt and SO.

## 2024-03-24 NOTE — PLAN OF CARE
Pt arrived to floor ~2340.    AxOx4, pleasant; anxious at times. Tele = SR / ST.    VSS on RA. Assist x1 / SBA. NPO except meds + ice chips. Urinal at bedside with adequate output. No BM.    PIV x2, infusing NS at 150. Scheduled abx.    R groin site-- soft and non-tender, dressing CDI.    No skin issues noted.    Pain managed with PRN IV Dilaudid 0.5mg x4, PO Oxycodone 10mg x2. Denies nausea / shortness of breath.    Hgb 8.5 at 0000 -> 7.9 at 0600.    Pt down to IR ~0030, then back to floor at 0145. Site checks now hourly until 0800.    Tentative plan for surgery consult + GI consult today. Conditional transfusion orders in place. Continue plan of care.

## 2024-03-24 NOTE — PLAN OF CARE
Goal Outcome Evaluation:  VSS, tele SR-ST, pain 6-7/10 abd, oxycodone q4h, tylenol heat. Hgb stable q4h, last 8.2. NPO. cont to monitor

## 2024-03-24 NOTE — PHARMACY-ADMISSION MEDICATION HISTORY
Pharmacy Intern Admission Medication History    Admission medication history is complete. The information provided in this note is only as accurate as the sources available at the time of the update.    Information Source(s): Patient via in-person    Pertinent Information: None    Changes made to PTA medication list:  Added: None  Deleted: None  Changed: Updated ibuprofen directions    Allergies reviewed with patient and updates made in EHR: yes    Medication History Completed By: Osvaldo Johansen 3/24/2024 9:35 AM    PTA Med List   Medication Sig Last Dose    ibuprofen (ADVIL/MOTRIN) 200 MG tablet Take 400 mg by mouth every 6 hours as needed for pain  at prn

## 2024-03-24 NOTE — PROGRESS NOTES
[facility]    Hospitalist Progress Note    Interval History   Patient is awake and alert.  Endorsing ongoing epigastric pain and left mid abdominal pain.  Denies any radiation into the back.  Afebrile.  Mild nausea.    -Data reviewed today: I reviewed all new labs and imaging results over the last 24 hours. I personally reviewed the abdominal CT image(s) showing active bleeding with a large peripancreatic fluid collection described on 3/11/2024.  Fluid contains large amount of blood products and has increased in size compared to prior exam.  Small volume hemoperitoneum. .    Physical Exam   Temp: 98.6  F (37  C) Temp src: Oral BP: 136/85 Pulse: 90   Resp: 18 SpO2: 100 % O2 Device: None (Room air)    Vitals:    03/23/24 2340   Weight: 83.7 kg (184 lb 8.4 oz)     Vital Signs with Ranges  Temp:  [97.6  F (36.4  C)-98.8  F (37.1  C)] 98.6  F (37  C)  Pulse:  [] 90  Resp:  [10-62] 18  BP: (106-147)/() 136/85  SpO2:  [91 %-100 %] 100 %  I/O last 3 completed shifts:  In: 435 [I.V.:435]  Out: 350 [Urine:350]    Physical Exam  Constitutional:       Appearance: He is ill-appearing.   Cardiovascular:      Rate and Rhythm: Normal rate and regular rhythm.      Pulses: Normal pulses.      Heart sounds: Normal heart sounds.   Pulmonary:      Effort: Pulmonary effort is normal. No respiratory distress.      Breath sounds: Normal breath sounds.   Abdominal:      General: Abdomen is flat. Bowel sounds are normal. There is no distension.      Tenderness: There is no abdominal tenderness. There is no guarding.      Comments: Significant epigastric tenderness and left mid abdominal tenderness.  Bowel sounds present.   Skin:     General: Skin is warm and dry.      Coloration: Skin is pale.   Neurological:      General: No focal deficit present.           Medications    heparin      STATIN NOT PRESCRIBED      sodium chloride 125 mL/hr at 03/24/24 1200      pantoprazole  40 mg Intravenous BID    sodium  chloride (PF)  3 mL Intracatheter Q8H    sodium chloride (PF)  3 mL Intracatheter Q8H       Data   Recent Labs   Lab 03/24/24  1011 03/24/24  0528 03/24/24  0010 03/23/24 2022 03/23/24 2002 03/23/24 2002 03/20/24  1731   WBC  --  7.2  --   --   --  19.8*  --    HGB 7.9* 7.9*  7.9* 8.5*  --    < > 10.5*  --    MCV  --  88  --   --   --  87  --    PLT  --  168  --   --   --  346  --    INR 1.23*  --   --   --   --   --   --    NA  --  139  --   --   --  139  --    POTASSIUM  --  4.1  --   --   --  3.8  --    CHLORIDE  --  103  --   --   --  100  --    CO2  --  22  --   --   --  24  --    BUN  --  14.9  --   --   --  19.1  --    CR  --  0.88  --   --   --  0.94  --    ANIONGAP  --  14  --   --   --  15  --    BUZZ  --  8.7  --   --   --  9.7  --    GLC  --  126*  --  157*  --  179*  --    ALBUMIN  --  3.8  --   --   --  4.3  --    PROTTOTAL  --  5.8*  --   --   --  6.8  --    BILITOTAL  --  0.9  --   --   --  0.6  --    ALKPHOS  --  63  --   --   --  79  --    ALT  --  21  --   --   --  26  --    AST  --  14  --   --   --  17  --    LIPASE  --   --   --   --   --  884* 240*    < > = values in this interval not displayed.       Recent Results (from the past 24 hour(s))   CT Abdomen Pelvis w Contrast   Result Value    Radiologist flags Active arterial bleeding (AA)    Narrative    EXAM: CT ABDOMEN PELVIS W CONTRAST  LOCATION: [facility]  DATE: 3/23/2024    INDICATION: severe abdominal pain, syncope, hx of pancreatitis  COMPARISON: 03/11/2024  TECHNIQUE: CT scan of the abdomen and pelvis was performed following injection of IV contrast. Multiplanar reformats were obtained. Dose reduction techniques were used.  CONTRAST: 62 mL Isovue 370    FINDINGS:   LOWER CHEST: Normal.    HEPATOBILIARY: Normal.    PANCREAS: Interval slight enlargement of known large peripancreatic fluid collection, currently 15 cm transversely by 10 cm AP by 10 cm long. There is a small blush of extravasated contrast  material along the superior margin of the fluid collection.   There is a large amount of hemorrhage within the peripancreatic fluid collection.    SPLEEN: Normal.    ADRENAL GLANDS: Normal.    KIDNEYS/BLADDER: Normal.    BOWEL: No free air. Moderate ascites compatible with hemoperitoneum. There is mild wall thickening of the distal stomach and proximal duodenum, which are displaced anteriorly by the pancreatic fluid collection.    LYMPH NODES: Normal.    VASCULATURE: The upper IVC has a flattened morphology. Abdominal aorta normal in caliber. There is marked narrowing of the portal venous confluence which is compressed posteriorly by the peripancreatic hematoma.    PELVIC ORGANS: Normal.    MUSCULOSKELETAL: Normal.      Impression    IMPRESSION:   1.  Active bleeding within large peripancreatic fluid collection described on 03/11/2024. The fluid collection contains large amount of blood products and has increased in size slightly compared to recent prior exam.  2.  Small volume hemoperitoneum.      [Critical Result: Active arterial bleeding ]    Finding was identified on 3/23/2024 8:41 PM CDT.     Dr. Machuca was contacted by me on 3/23/2024 8:47 PM CDT and verbalized understanding of the critical result.   IR Visceral Angiogram    Narrative    [place] [facility]  LOCATION: [facility]  DATE: 3/24/2024    PROCEDURE: Celiac angiogram, left gastric arteriogram, moderate  sedation    ATTENDING: Ezio Morrow MD    INDICATION: 28-year-old male with CT evidence of hemorrhage into a  pancreatic pseudocyst.    CONSENT: The risks, benefits and alternatives of the procedure were  discussed with the patient  in detail. All questions were answered.  Informed consent was given to proceed with the procedure.    MODERATE SEDATION: Versed 2.5 mg IV; Fentanyl 125 mcg IV.  Under  physician supervision, Versed and fentanyl were administered for  moderate sedation. Pulse oximetry, heart rate and blood pressure were  continuously  monitored by an independent trained observer. The  physician spent 30 minutes of face-to-face sedation time with the  patient.    CONTRAST: 60 mL Isovue 300, intra-arterially.  ANTIBIOTICS: None.  ADDITIONAL MEDICATIONS: None.    FLUOROSCOPIC TIME: 3.1 minutes.  RADIATION DOSE: Air Kerma: 351 mGy.    COMPLICATIONS: No immediate complications.    STERILE BARRIER TECHNIQUE: Maximum sterile barrier technique was used.  Cutaneous antisepsis was performed at the operative site with  application of 2% chlorhexidine and large sterile drape. Prior to the  procedure, the  and assistant performed hand hygiene and wore  hat, mask, sterile gown, and sterile gloves during the entire  procedure.    PROCEDURE:  The procedure, including the risks, benefits, and  alternatives to the procedure itself were discussed with the patient.  When all of their questions were answered informed written and verbal  consent was obtained. The patient was then brought to the  Interventional Radiology suite, placed in a supine position, and both  of the patient's groins were sterilely prepped and draped. The right  common femoral artery was noted to be ultrasound patent. After giving  local anesthesia with lidocaine, the right common femoral artery was  punctured with a 21 gauge needle, under ultrasound guidance with a  permanent image stored. A 0.018 inch wire advanced through the needle  into the external iliac artery under fluoroscopic guidance. The needle  was then exchanged over the wire for a 4 [place] coaxial dilator. The  inner 3 [place] dilator and 0.018 inch wire were then exchanged for a  0.035 inch guidewire. The outer 4 [place] dilator was then exchanged  over the guidewire for a 5 [place] vascular sheath.  A pressurized  heparinized saline drip was then administered through the side arm of  the sheath.     A 5 [place] Cobra catheter was advanced over a 0.035 inch angled  Glidewire  and used to select the celiac axis. Digital  subtraction  angiography was performed. The Cobra catheter was then removed from  the celiac axis and used to select the left gastric artery which arose  separately from the abdominal aorta. Digital subtraction angiography  was performed.     Contrast was injected through the right femoral sheath. The right  femoral sheath was removed and hemostasis was achieved with use of a  Perclose arterial closure device. A sterile dressing was applied.    FINDINGS:  1.  Patent celiac, splenic, gastroduodenal, gastroepiploic, and left  gastric arteries.  2.  No evidence of active extravasation of contrast, pseudoaneurysm,  or early venous filling to suggest a source of acute arterial  hemorrhage.      Impression    IMPRESSION:    No evidence of acute arterial hemorrhage within the celiac,  gastroduodenal, gastroepiploic, or left gastric arterial distribution.    COLETTE CHEN MD         SYSTEM ID:  [id number]         Assessment & Plan      Duane Ramos is a 28 year old male admitted on 3/23/2024. He presents with abdominal pain, syncope     Large peripancreatic necrotic fluid collection with hemorrhage  Small volume hemoperitoneum  Syncope  Recent alcohol induced pancreatitis  *Pt hospitalized 2/11-14 for pancreatitis 2/2 Etoh. CT at that time showed aucte interstitial pancreatitis.   *ongoing abd pain over last month. Has had 30 lb weight loss 2/2 poor appetite. Worsening pain PTA similar to previous and n/v. At least 2 syncopal episodes (in triage) and presyncopal events before. No f/c.   *in ED AFVSS, HR borderline tachy. Lipase 884 (previous 240 3/20). WBC 19.8.   *CT w/ active bleeding within large peripancreatic fluid collection described 3/11. Collection contains large amount of blood products, sl larger than previous. Also small volume hemoperitoneum  - IMC  - no anticoagulants  - prn analgesics  - IV fluids, NPO  - IR consult. Underwent IR angiogram with no evidence of active arterial hemorrhage from left gastric,  "splenic gastroduodenal artery, gastro diploic or hepatic arteries.  No embolization was performed.  -Hemoglobin continues to trend down-1 unit of packed RBCs was given on 3/24/2024 for hemoglobin less than 8.  - GI consult completed.  Reviewed notes.  Appreciate recommendations.  -Continue close monitoring and n.p.o. status for now.  If there are signs of increasing bleeding patient might have to undergo repeat IR procedure with empiric embolization.  - surgery consult  - Discontinued this since there is no evidence of active infection at this time.  started on Zosyn on admission.    -  Pro-Jose within normal limits.  - pantoprazole 40 mg IV BID  -Every 4 hours hemoglobin checks.     Acute blood loss anemia  Hgb 10.5 on presentation. Baseline recent 12.0 2/14/24 (was 16 on admission 2/11).   - monitor q4 x 6  - type and cross, consent signed and in chart  - transfuse hgb<8 w/ hemorrhage     Alcohol use disorder  Hepatic steatosis  LFTs normal on presentation. No alcohol since recent admission.   - encourage ongoing abstinence     Diet: NPO for Medical/Clinical Reasons Except for: Meds, Ice ChipsNPO, IV fluids  DVT Prophylaxis: Pneumatic Compression Devices and Ambulate every shift  [place] Catheter: Not present  Lines: None     Cardiac Monitoring: None  Code Status: Full CodeFull       Clinically Significant Risk Factors Present on Admission               # Coagulation Defect: INR = 1.23 (Ref range: 0.85 - 1.15) and/or PTT = 31 Seconds (Ref range: 22 - 38 Seconds), will monitor for bleeding         # Overweight: Estimated body mass index is 28.9 kg/m  as calculated from the following:    Height as of this encounter: 1.702 m (5' 7\").    Weight as of this encounter: 83.7 kg (184 lb 8.4 oz).              Greater than 50 minutes spent on chart review, imaging review, discussing care with bedside nurse, examination and reviewing subspecialty recommendations.    Destini Fleming MD, MD  [phone](p)   "

## 2024-03-24 NOTE — ED TRIAGE NOTES
Patient at registration desk, heard crash and patient was down on knees with eyes rolled back.  Patient became alert and was lifted into wheelchair. After approximately 30 seconds of speaking patient again lost consciousness. Patient again became alert spontaneously on the way back to room 1.  Has history pancreatitis, patient here for increased level of pain.  Extremely pale upon presentation.     Triage Assessment (Adult)       Row Name 03/23/24 2009          Triage Assessment    Airway WDL WDL        Respiratory WDL    Respiratory WDL WDL        Skin Circulation/Temperature WDL    Skin Circulation/Temperature WDL WDL        Cardiac WDL    Cardiac WDL X     Cardiac Rhythm SB        Peripheral/Neurovascular WDL    Peripheral Neurovascular WDL WDL        Cognitive/Neuro/Behavioral WDL    Cognitive/Neuro/Behavioral WDL X  syncopal episode x2 in lobby, currently alert        Pupils (CN II)    Pupil PERRLA yes

## 2024-03-24 NOTE — PLAN OF CARE
"Orientations: WDL  Vitals/Pain: VSS on RA, pain fluctuates. Around noon, pain shot up to \"10 out of 10\" pain on 0-10 pain scale. Pain described abdominal pain radiates towards neck and back. Provider notified. IV dilaudid allowed. Pain reduced by the time medicine was approved. Patient acknowledged he would put on his call light if he needed pain medicine.   Tele: SR/ST  Lines/Drains: infused 1 unit RBC this shift. PIV C/D/I.  Skin/Wounds: R groin site C/D/I  GI/: voiding, LBM 3/23. Clears diet.  Labs: Hgb 8.2 post-RBC blood transfusion  Ambulation/Assist: SBA (two recent episodes of syncope/falls at outside hospital)  Plan: nursing will continue to monitor and report unstable s/s to providers    Goal Outcome Evaluation:    Problem: Adult Inpatient Plan of Care  Goal: Absence of Hospital-Acquired Illness or Injury  Intervention: Identify and Manage Fall Risk  Recent Flowsheet Documentation  Taken 3/24/2024 0800 by Nell Mcfarlane RN  Safety Promotion/Fall Prevention:   activity supervised   assistive device/personal items within reach   clutter free environment maintained   increased rounding and observation   increase visualization of patient   nonskid shoes/slippers when out of bed   patient and family education   room near nurse's station   room organization consistent   safety round/check completed     Problem: Pain Acute  Goal: Optimal Pain Control and Function  Intervention: Optimize Psychosocial Wellbeing  Recent Flowsheet Documentation  Taken 3/24/2024 0800 by Nell Mcfarlane RN  Supportive Measures:   active listening utilized   relaxation techniques promoted                         "

## 2024-03-24 NOTE — CONSULTS
"[facility] General Surgery Consultation    Duane Ramos MRN# [id number]   YOB: 1995 Age: 28 year old      Date of Admission:  3/23/2024  Date of Consult: 3/24/2024         Assessment and Plan:   Duane Ramos is a 28 year old male with recent history of alcohol induced pancreatitis hospitalized on 2/11-2/14 that is now complicated with large pancreatic pseudocyst with active bleed.  Patient received 1 PRBC overnight.  Hemoglobin has stabilized 7.9 --> 8.4.    PLAN:  -Agree and appreciate consult with GI for assistance in management.  -If any concern of bleeding again recommend consult to IR  -Patient becomes unstable headed toward surgery recommend transfer to higher level of care with hepatobiliary surgery  -Okay for clear diet from my perspective. No surgery indicated at this time         Requesting Physician:      Atif Pantoja MD           Chief Complaint:     Pancreatic pseudocyst, bleeding         History of Present Illness:   Duane Ramos is a 28 year old male who was seen in consultation at the request of Atif Churchill MD who presented with acute abdominal pain.  He has a history of alcohol use and recent episode of pancreatitis last month.  Since his diagnosis of alcohol induced parotitis he has been having ongoing abdominal pain with poor appetite and unintentional weight loss.  Last night he developed severe epigastric pain with associated nausea and vomiting.  He also reportedly had 2 episodes of syncope.  This pain is similar to his first episode of pancreatitis.    Today he reports he is doing okay.  Pain is controlled with pain medication.  He is mostly worried about when he is going to discharge and be able to work again.  He denies any active nausea or vomiting.  He feels hungry.          Physical Exam:   Blood pressure 125/81, pulse 105, temperature 98.6  F (37  C), temperature source Oral, resp. rate 29, height 1.702 m (5' 7\"), weight " 83.7 kg (184 lb 8.4 oz), SpO2 94%.  184 lbs 8.4 oz  General: Vital signs reviewed, in no apparent distress  Eyes: Anicteric  HENT: Normocephalic, atraumatic, trachea midline   Respiratory: Breathing nonlabored  Cardiovascular: Regular rate and rhythm  GI: Abdomen soft, distended, mildly tender to palpation in the left upper quadrant.  Musculoskeletal: No gross deformities  Neurologic: Grossly nonfocal exam  Psychiatric: Normal mood, affect and insight  Integumentary: Warm and dry         Past Medical History:   No past medical history on file.         Past Surgical History:     Past Surgical History:   Procedure Laterality Date    IR VISCERAL ANGIOGRAM  3/24/2024    OTHER SURGICAL HISTORY Right 2008    broken clavicle    MT LAP,APPENDECTOMY N/A 11/18/2017    Procedure: APPENDECTOMY, LAPAROSCOPIC;  Surgeon: Milton Francois MD;  Location: [facility];  Service: General            Current Medications:          pantoprazole  40 mg Intravenous BID    sodium chloride (PF)  3 mL Intracatheter Q8H    sodium chloride (PF)  3 mL Intracatheter Q8H       acetaminophen **OR** acetaminophen, calcium carbonate, fentaNYL, flumazenil, heparin, HYDROmorphone, HYDROmorphone, lidocaine 4%, lidocaine (buffered or not buffered), midazolam, naloxone **OR** naloxone **OR** naloxone **OR** naloxone, ondansetron **OR** ondansetron, oxyCODONE, oxyCODONE, STATIN NOT PRESCRIBED, senna-docusate **OR** senna-docusate, sodium chloride (PF), sodium chloride (PF)         Home Medications:     Prior to Admission medications    Medication Sig Last Dose Taking? Auth Provider Long Term End Date   ibuprofen (ADVIL/MOTRIN) 200 MG tablet Take 400 mg by mouth every 6 hours as needed for pain  at prn Yes Reported, Patient              Allergies:   No Known Allergies         Family History:   No family history on file.        Social History:   Duane Ramos  reports that he has quit smoking. His smoking use included cigarettes. He has never used  smokeless tobacco. He reports current alcohol use. He reports that he does not use drugs.          Review of Systems:   Constitutional: No fevers or chills  Eyes: No blurred or double vision  HENT: Denies headaches, No rhinorrhea, No sore throat  Respiratory: No cough or shortness of breath  Cardiovascular: Denies chest pain or palpitations  Gastrointestinal: No abdominal pain or nausea/vomiting  Genitourinary: No hematuria or dysuria  Musculoskeletal: Denies arthralgias or myalgias  Neurologic: No numbness or tingling  Integumentary: No skin rashes         Labs/Imaging   All new lab and imaging data was reviewed.   Recent Labs   Lab 03/24/24  1231 03/24/24  1011 03/24/24  0528 03/24/24  0010 03/23/24 2002   WBC  --   --  7.2  --  19.8*   HGB 8.4* 7.9* 7.9*  7.9*   < > 10.5*   HCT  --   --  24.8*  --  31.9*   MCV  --   --  88  --  87   PLT  --   --  168  --  346    < > = values in this interval not displayed.     Recent Labs   Lab 03/24/24  0528 03/23/24 2022 03/23/24 2002     --  139   POTASSIUM 4.1  --  3.8   CHLORIDE 103  --  100   CO2 22  --  24   ANIONGAP 14  --  15   * 157* 179*   BUN 14.9  --  19.1   CR 0.88  --  0.94   GFRESTIMATED >90  --  >90   BUZZ 8.7  --  9.7   PROTTOTAL 5.8*  --  6.8   ALBUMIN 3.8  --  4.3   BILITOTAL 0.9  --  0.6   ALKPHOS 63  --  79   AST 14  --  17   ALT 21  --  26       I have personally reviewed the imaging studies-   CT abdomen pelvis  IMPRESSION:   1.  Active bleeding within large peripancreatic fluid collection described on 03/11/2024. The fluid collection contains large amount of blood products and has increased in size slightly compared to recent prior exam.  2.  Small volume hemoperitoneum.      Tino Kerr MD

## 2024-03-24 NOTE — INTERVAL H&P NOTE
"I have reviewed the surgical (or preoperative) H&P that is linked to this encounter, and examined the patient. There are no significant changes    Clinical Conditions Present on Arrival:  Clinically Significant Risk Factors Present on Admission                  # Overweight: Estimated body mass index is 28.9 kg/m  as calculated from the following:    Height as of this encounter: 1.702 m (5' 7\").    Weight as of this encounter: 83.7 kg (184 lb 8.4 oz).       "

## 2024-03-24 NOTE — IR NOTE
Interventional Radiology Intra-procedural Nursing Note    Patient Name: Duane Ramos  Medical Record Number: [id number]  Today's Date: March 24, 2024    Procedure: Visceral angiogram with IV moderate sedation  Start time: 0057  End time: 0120  Report provided to: JACOBO Alsa  Patient depart time and location: 0133 to Room 307    Note: Patient entered Interventional Radiology Suite number 1 via cart. Patient awake, alert and oriented. Assisted onto procedural table in supine position. Prepped and draped.  Dr. Morrow in room. Time out and procedure started. Vital signs stable. Telemetry reading ST.    Procedure well tolerated by patient without complications. Procedure end with debrief by Dr. Morrow.  5 Fr sheath removed,   Manual pressure applied until hemostasis achieved. Quick clot and tegaderm dressing applied to right interventional procedure access site, dressing is c/d/I.    2 hours bedrest per Dr. Morrow, until 0315.      Administered medication totals:  Lidocaine 1% 8 mL Intradermal  Versed 2 mg IVP  Fentanyl 125 mcg IVP    Last dose of sedation administered at 0111.    25.6

## 2024-03-24 NOTE — H&P
[facility]    History and Physical - Hospitalist Service       Date of Admission:  3/23/2024    Assessment & Plan      Duane Ramos is a 28 year old male admitted on 3/23/2024. He presents with abdominal pain, syncope    Large peripancreatic fluid collection with hemorrhage  Small volume hemoperitoneum  Syncope  Recent alcohol induced pancreatitis  *Pt hospitalized 2/11-14 for pancreatitis 2/2 Etoh. CT at that time showed aucte interstitial pancreatitis.   *ongoing abd pain over last month. Has had 30 lb weight loss 2/2 poor appetite. Worsening pain PTA similar to previous and n/v. At least 2 syncopal episodes (in triage) and presyncopal events before. No f/c.   *in ED AFVSS, HR borderline tachy. Lipase 884 (previous 240 3/20). WBC 19.8.   *CT w/ active bleeding within large peripancreatic fluid collection described 3/11. Collection contains large amount of blood products, sl larger than previous. Also small volume hemoperitoneum  - IMC  - no anticoagulants  - prn analgesics  - IV fluids, NPO  - IR consult  - GI consult  - surgery consult  - empiric zosyn for now  - check procal  - pantoprazole 40 mg IV BID    Acute blood loss anemia  Hgb 10.5 on presentation. Baseline recent 12.0 2/14/24 (was 16 on admission 2/11).   - monitor q4 x 6  - type and cross, consent signed and in chart  - transfuse hgb<8 w/ hemorrhage    Alcohol use disorder  Hepatic steatosis  LFTs normal on presentation. No alcohol since recent admission.   - encourage ongoing abstinence          Diet: NPO for Medical/Clinical Reasons Except for: Meds, Ice ChipsNPO, IV fluids  DVT Prophylaxis: Pneumatic Compression Devices and Ambulate every shift  [place]ved[place] Catheter: Not present  Lines: None     Cardiac Monitoring: None  Code Status: Full CodeFull    Clinically Significant Risk Factors Present on Admission                       # Overweight: Estimated body mass index is 28.9 kg/m  as calculated from the following:    Height as  "of this encounter: 1.702 m (5' 7\").    Weight as of this encounter: 83.7 kg (184 lb 8.4 oz).              Disposition Plan           Atif Pantoja MD  Hospitalist Service  [facility]  Securely message with EUDOWEB (more info)  Text page via [facility] Paging/Directory     ______________________________________________________________________    Chief Complaint   Abdominal pain, syncope    History is obtained from the patient, electronic health record, and emergency department physician    History of Present Illness   Duane Ramos is a 28 year old male who presents with abdominal pain.  Patient was hospitalized about a month ago with alcohol induced pancreatitis.  He states he drinks sometimes over the week but then on the weekends he will drink heavy.  His pancreatitis happened after a particularly heavy weekend alcohol binge.  He notes that since discharge she has not had any alcohol.  He states he is feeling better at the time of discharge but still had abdominal pain.  He has noted the pain is coming gone over the last month.  Today, however, the pain became quite severe.  He also states he was sitting and got up to go to the bathroom when he had a near syncopal episode.  He did not pass out but he definitely went down to the floor.  He has had some nausea vomiting today.  Thank you to the emergency department secondary to pain and in triage twice he had syncopal episodes.  1 was falling to his knees with his eyes rolled back and the second was when he was in wheel chair.  He was transferred to [facility] for interventional radiology and visceral angiogram.  This time my visit he is comfortable and in no distress.  His pain is controlled.  Denies fevers or chills.  No chest pain or shortness of breath.  No other abdominal pain.  He does not currently feel nauseous.      Past Medical History    pancreatitis    Past Surgical History   Past Surgical History:   Procedure " Laterality Date    OTHER SURGICAL HISTORY Right 2008    broken clavicle    ME LAP,APPENDECTOMY N/A 11/18/2017    Procedure: APPENDECTOMY, LAPAROSCOPIC;  Surgeon: Milton Francois MD;  Location: [facility];  Service: General       Prior to Admission Medications   Prior to Admission Medications   Prescriptions Last Dose Informant Patient Reported? Taking?   ibuprofen (ADVIL/MOTRIN) 200 MG tablet   Yes No   Sig: Take 200 mg by mouth every 4 hours as needed for pain      Facility-Administered Medications: None        Review of Systems    The 10 point Review of Systems is negative other than noted in the HPI or here.     Social History   I have reviewed this patient's social history and updated it with pertinent information if needed.  Social History     Tobacco Use    Smoking status: Former     Types: Cigarettes    Smokeless tobacco: Never   Vaping Use    Vaping Use: Never used   Substance Use Topics    Alcohol use: Yes    Drug use: No       Physical Exam   Vital Signs: Temp: 98.7  F (37.1  C) Temp src: Oral                Weight: 184 lbs 8.4 oz    General Appearance: Alert, very pleasant, no distress  Respiratory: CTA B  Cardiovascular: RRR, no murmur. No edema  GI: soft, tender in mid-epigastric and LUQ area to light touch. No g/r  Skin: no rashes or lesions grossly    Other: CN grossly intact, ZUNIGA      Medical Decision Making       80 MINUTES SPENT BY ME on the date of service doing chart review, history, exam, documentation & further activities per the note.      Data   ------------------------- PAST 24 HR DATA REVIEWED -----------------------------------------------    I have personally reviewed the following data over the past 24 hrs:    19.8 (H)  \   8.5 (L)   / 346     139 100 19.1 /  157 (H)   3.8 24 0.94 \     ALT: 26 AST: 17 AP: 79 TBILI: 0.6   ALB: 4.3 TOT PROTEIN: 6.8 LIPASE: 884 (H)     Procal: N/A CRP: N/A Lactic Acid: 1.8         Imaging results reviewed over the past 24 hrs:   Recent Results  (from the past 24 hour(s))   CT Abdomen Pelvis w Contrast   Result Value    Radiologist flags Active arterial bleeding (AA)    Narrative    EXAM: CT ABDOMEN PELVIS W CONTRAST  LOCATION: [facility]  DATE: 3/23/2024    INDICATION: severe abdominal pain, syncope, hx of pancreatitis  COMPARISON: 03/11/2024  TECHNIQUE: CT scan of the abdomen and pelvis was performed following injection of IV contrast. Multiplanar reformats were obtained. Dose reduction techniques were used.  CONTRAST: 62 mL Isovue 370    FINDINGS:   LOWER CHEST: Normal.    HEPATOBILIARY: Normal.    PANCREAS: Interval slight enlargement of known large peripancreatic fluid collection, currently 15 cm transversely by 10 cm AP by 10 cm long. There is a small blush of extravasated contrast material along the superior margin of the fluid collection.   There is a large amount of hemorrhage within the peripancreatic fluid collection.    SPLEEN: Normal.    ADRENAL GLANDS: Normal.    KIDNEYS/BLADDER: Normal.    BOWEL: No free air. Moderate ascites compatible with hemoperitoneum. There is mild wall thickening of the distal stomach and proximal duodenum, which are displaced anteriorly by the pancreatic fluid collection.    LYMPH NODES: Normal.    VASCULATURE: The upper IVC has a flattened morphology. Abdominal aorta normal in caliber. There is marked narrowing of the portal venous confluence which is compressed posteriorly by the peripancreatic hematoma.    PELVIC ORGANS: Normal.    MUSCULOSKELETAL: Normal.      Impression    IMPRESSION:   1.  Active bleeding within large peripancreatic fluid collection described on 03/11/2024. The fluid collection contains large amount of blood products and has increased in size slightly compared to recent prior exam.  2.  Small volume hemoperitoneum.      [Critical Result: Active arterial bleeding ]    Finding was identified on 3/23/2024 8:41 PM CDT.     Dr. Machuca was contacted by me on 3/23/2024 8:47 PM  CDT and verbalized understanding of the critical result.

## 2024-03-24 NOTE — ED PROVIDER NOTES
History     Chief Complaint   Patient presents with    Syncope     Patient passed out at registration desk       HPI  Duane Ramos is a 28 year old male with history of alcohol use and presumed alcohol induced pancreatitis presents for evaluation of recurrent episodes of abdominal pain and syncope.  Patient has been having ongoing abdominal pain symptoms for the past month or so since he initially had pancreatitis.  Has had about a 30 pound weight loss unintentionally due to poor appetite.  Tonight developed increasing epigastric abdominal pain with nausea vomiting and at least 2 episodes of syncope although had several previous presyncopal episodes.  Patient reports his current pain is similar to what he had with pancreatitis in the past.  Denies fevers or chills.  Denies headache.  Reports feeling lightheaded previously but is feeling better laying down.  Denies chest pain or difficulty breathing.  Reports a history of appendectomy in the past.  No other abdominal surgeries.  Denies any recent alcohol use over the past month since his initial episode of pancreatitis.    Allergies:  No Known Allergies    Problem List:    Patient Active Problem List    Diagnosis Date Noted    Pancreas hemorrhage 03/24/2024     Priority: Medium    ETOH abuse 02/29/2024     Priority: Medium    Alcohol-induced acute pancreatitis without infection or necrosis 02/11/2024     Priority: Medium    RLQ abdominal pain 11/18/2017     Priority: Medium        Past Medical History:    No past medical history on file.    Past Surgical History:    Past Surgical History:   Procedure Laterality Date    IR VISCERAL ANGIOGRAM  3/24/2024    OTHER SURGICAL HISTORY Right 2008    broken clavicle    IN LAP,APPENDECTOMY N/A 11/18/2017    Procedure: APPENDECTOMY, LAPAROSCOPIC;  Surgeon: Milton Francois MD;  Location: [facility];  Service: General       Family History:    No family history on file.    Social History:  Marital Status:  Single  "[1]  Social History     Tobacco Use    Smoking status: Former     Types: Cigarettes    Smokeless tobacco: Never   Vaping Use    Vaping Use: Never used   Substance Use Topics    Alcohol use: Yes    Drug use: No        Medications:    No current outpatient medications on file.        Review of Systems   Constitutional:  Positive for appetite change and unexpected weight change. Negative for fever.   HENT:  Negative for congestion and sore throat.    Respiratory:  Negative for cough, chest tightness and shortness of breath.    Cardiovascular:  Negative for chest pain.   Gastrointestinal:  Positive for abdominal pain, nausea and vomiting. Negative for diarrhea.   Genitourinary:  Negative for decreased urine volume.   Musculoskeletal:  Negative for neck pain and neck stiffness.   Skin:  Positive for pallor. Negative for wound.   Neurological:  Positive for syncope and light-headedness. Negative for seizures, speech difficulty, weakness and headaches.   Psychiatric/Behavioral:  Negative for confusion.    All other systems reviewed and are negative.      Physical Exam   BP: 126/78  Pulse: 66  Temp: 98  F (36.7  C)  Resp: 18  Height: 170.2 cm (5' 7\")  SpO2: 99 %      Physical Exam  Vitals and nursing note reviewed.   Constitutional:       Comments: Patient awake and alert, pale appearing but able to speak in full sentences.  Appears uncomfortable and diaphoretic   HENT:      Head: Atraumatic.      Nose: Nose normal.      Mouth/Throat:      Mouth: Mucous membranes are moist.   Eyes:      Conjunctiva/sclera: Conjunctivae normal.   Cardiovascular:      Rate and Rhythm: Normal rate and regular rhythm.      Pulses: Normal pulses.   Pulmonary:      Effort: Pulmonary effort is normal.   Abdominal:      Palpations: Abdomen is soft.      Tenderness: There is abdominal tenderness (Diffuse prominent upper abdominal tenderness). There is guarding. There is no rebound.   Musculoskeletal:         General: Normal range of motion.      " Cervical back: Normal range of motion.   Skin:     General: Skin is warm and dry.      Capillary Refill: Capillary refill takes less than 2 seconds.   Neurological:      Mental Status: He is alert and oriented to person, place, and time.   Psychiatric:         Mood and Affect: Mood normal.         ED Course        Procedures              EKG Interpretation:      Interpreted by Cornelius Machuca MD  Time reviewed: 810p  Symptoms at time of EKG: syncope   Rhythm: sinus bradycardia  Rate: 59  Axis: normal  Ectopy: none  Conduction: normal  ST Segments/ T Waves: No ST-T wave changes  Q Waves: none  Comparison to prior: Similar previous EKG 2/11/2024    Clinical Impression:  sinus bradycardia without acute abnormality               Results for orders placed or performed during the hospital encounter of 03/23/24 (from the past 24 hour(s))   ABO/Rh type and screen    Narrative    The following orders were created for panel order ABO/Rh type and screen.  Procedure                               Abnormality         Status                     ---------                               -----------         ------                     Adult Type and Screen[[id number]]                            Edited Result - FINAL        Please view results for these tests on the individual orders.   Lactic acid whole blood   Result Value Ref Range    Lactic Acid 1.8 0.7 - 2.0 mmol/L   [place] Draw    Narrative    The following orders were created for panel order [place] Draw.  Procedure                               Abnormality         Status                     ---------                               -----------         ------                     Extra Blue Top Tube[[id number]]                              Final result               Extra Red Top Tube[[id number]]                               Final result               Extra Green Top (Lithium...[[id number]]                      Final result               Extra Purple Top Tube[[id number]]                             Final result                 Please view results for these tests on the individual orders.   Adult Type and Screen   Result Value Ref Range    ABO/RH(D) O POS     Antibody Screen Negative Negative    SPECIMEN EXPIRATION DATE [id number]    Extra Blue Top Tube   Result Value Ref Range    Hold Specimen JIC    Extra Red Top Tube   Result Value Ref Range    Hold Specimen JIC    Extra Green Top (Lithium Heparin) Tube   Result Value Ref Range    Hold Specimen JIC    Extra Purple Top Tube   Result Value Ref Range    Hold Specimen JIC    CBC with platelets differential    Narrative    The following orders were created for panel order CBC with platelets differential.  Procedure                               Abnormality         Status                     ---------                               -----------         ------                     CBC with platelets and d...[[id number]]  Abnormal            Final result                 Please view results for these tests on the individual orders.   Comprehensive metabolic panel   Result Value Ref Range    Sodium 139 135 - 145 mmol/L    Potassium 3.8 3.4 - 5.3 mmol/L    Carbon Dioxide (CO2) 24 22 - 29 mmol/L    Anion Gap 15 7 - 15 mmol/L    Urea Nitrogen 19.1 6.0 - 20.0 mg/dL    Creatinine 0.94 0.67 - 1.17 mg/dL    GFR Estimate >90 >60 mL/min/1.73m2    Calcium 9.7 8.6 - 10.0 mg/dL    Chloride 100 98 - 107 mmol/L    Glucose 179 (H) 70 - 99 mg/dL    Alkaline Phosphatase 79 40 - 150 U/L    AST 17 0 - 45 U/L    ALT 26 0 - 70 U/L    Protein Total 6.8 6.4 - 8.3 g/dL    Albumin 4.3 3.5 - 5.2 g/dL    Bilirubin Total 0.6 <=1.2 mg/dL   Lipase   Result Value Ref Range    Lipase 884 (H) 13 - 60 U/L   CBC with platelets and differential   Result Value Ref Range    WBC Count 19.8 (H) 4.0 - 11.0 10e3/uL    RBC Count 3.68 (L) 4.40 - 5.90 10e6/uL    Hemoglobin 10.5 (L) 13.3 - 17.7 g/dL    Hematocrit 31.9 (L) 40.0 - 53.0 %    MCV 87 78 - 100 fL    MCH 28.5 26.5 - 33.0 pg    MCHC  32.9 31.5 - 36.5 g/dL    RDW 12.3 10.0 - 15.0 %    Platelet Count 346 150 - 450 10e3/uL    % Neutrophils 84 %    % Lymphocytes 11 %    % Monocytes 4 %    % Eosinophils 0 %    % Basophils 0 %    % Immature Granulocytes 0 %    NRBCs per 100 WBC 0 <1 /100    Absolute Neutrophils 16.6 (H) 1.6 - 8.3 10e3/uL    Absolute Lymphocytes 2.2 0.8 - 5.3 10e3/uL    Absolute Monocytes 0.8 0.0 - 1.3 10e3/uL    Absolute Eosinophils 0.0 0.0 - 0.7 10e3/uL    Absolute Basophils 0.1 0.0 - 0.2 10e3/uL    Absolute Immature Granulocytes 0.1 <=0.4 10e3/uL    Absolute NRBCs 0.0 10e3/uL   Glucose by meter   Result Value Ref Range    GLUCOSE BY METER POCT 157 (H) 70 - 99 mg/dL   CT Abdomen Pelvis w Contrast   Result Value Ref Range    Radiologist flags Active arterial bleeding (AA)     Narrative    EXAM: CT ABDOMEN PELVIS W CONTRAST  LOCATION: [facility]  DATE: 3/23/2024    INDICATION: severe abdominal pain, syncope, hx of pancreatitis  COMPARISON: 03/11/2024  TECHNIQUE: CT scan of the abdomen and pelvis was performed following injection of IV contrast. Multiplanar reformats were obtained. Dose reduction techniques were used.  CONTRAST: 62 mL Isovue 370    FINDINGS:   LOWER CHEST: Normal.    HEPATOBILIARY: Normal.    PANCREAS: Interval slight enlargement of known large peripancreatic fluid collection, currently 15 cm transversely by 10 cm AP by 10 cm long. There is a small blush of extravasated contrast material along the superior margin of the fluid collection.   There is a large amount of hemorrhage within the peripancreatic fluid collection.    SPLEEN: Normal.    ADRENAL GLANDS: Normal.    KIDNEYS/BLADDER: Normal.    BOWEL: No free air. Moderate ascites compatible with hemoperitoneum. There is mild wall thickening of the distal stomach and proximal duodenum, which are displaced anteriorly by the pancreatic fluid collection.    LYMPH NODES: Normal.    VASCULATURE: The upper IVC has a flattened morphology. Abdominal  aorta normal in caliber. There is marked narrowing of the portal venous confluence which is compressed posteriorly by the peripancreatic hematoma.    PELVIC ORGANS: Normal.    MUSCULOSKELETAL: Normal.      Impression    IMPRESSION:   1.  Active bleeding within large peripancreatic fluid collection described on 03/11/2024. The fluid collection contains large amount of blood products and has increased in size slightly compared to recent prior exam.  2.  Small volume hemoperitoneum.      [Critical Result: Active arterial bleeding ]    Finding was identified on 3/23/2024 8:41 PM CDT.     Dr. Machuca was contacted by me on 3/23/2024 8:47 PM CDT and verbalized understanding of the critical result.       Medications   lactated ringers BOLUS 1,000 mL (1,000 mLs Intravenous $New Bag 3/23/24 2015)   iopamidol (ISOVUE-370) solution 89 mL (89 mLs Intravenous $Given 3/23/24 2026)   sodium chloride 0.9 % bag 500mL for CT scan flush use (62 mLs Intravenous $Given 3/23/24 2027)     8:46 PM: Discussed with Dr Mullen, radiology: enlargement of fluid around pancreatitis. Active bleeding into necrotic pancreatic pseudocyst.     8:48 PM: Paging for transfer for possible IR intervention.    9:02 PM: Discussed with IR, Dr Morrow at [facility].  Agrees patient would be a likely candidate for IR intervention.  Would require transfer.    9:09 PM: Patient updated at bedside.  He remains awake and alert.  Pain better controlled.  Remains hemodynamically stable with a good blood pressure and heart rate.  Discussed with patient placement.  Will start with [facility] telemetry bed search.  Advised the patient does likely have a higher than average chance of decompensating but appears to be relatively stable at this point.    9:28 PM: Discussed with hospitalist, Dr Rico. Will accept for [facility] bed.     Assessments & Plan (with Medical Decision Making)  28-year-old male with history of alcohol use and alcoholic induced pancreatitis presenting for  evaluation of recurrent abdominal pain and  several syncopal episodes this evening.  Patient pale and ill-appearing upon arrival with some diaphoresis.   and is emergently calledhe syncopized in triage with nurse to room 1 for emergent evaluation.  Patient had regained consciousness in the ED and history obtained from both patient and significant other at bedside.  Patient was worked up for his acute abdominal pain with CT scan and labs.  Labs showed evidence of recurrent pancreatitis with notable increased white count.  CT showed acute hemorrhage into the peripancreatic fluid collection suggestive of recurrent hemorrhage with active extravasation and a small amount of hemoperitoneum.  Consulted with interventional radiology at [facility] who agrees patient may be a good candidate for interventional embolization.  Patient transferred to [facility] for IR consultation and possible surgical intervention.     I have reviewed the nursing notes.    I have reviewed the findings, diagnosis, plan and need for follow up with the patient.       Critical Care Addendum  My initial assessment, based on my review of nursing observations, review of vital signs, focused history, and physical exam, established a high suspicion that Duane Ramos has  acute hemorrhagic pancreatitis with active hemorrhage , which requires immediate intervention, and therefore he is critically ill.     After the initial assessment, the care team initiated multiple lab tests and initiated medication therapy with pain control and IV fluids  to provide stabilization care. Due to the critical nature of this patient, I reassessed nursing observations, vital signs, physical exam, mental status, and respiratory status multiple times prior to his disposition.     Time also spent performing documentation, reviewing test results, and coordination of care.     Critical care time (excluding teaching time and procedures): 40 minutes.                Discharge Medication List as of 3/23/2024 10:34 PM          Final diagnoses:   Acute hemorrhagic pancreatitis   Anemia due to blood loss, acute       3/23/2024   [facility] EMERGENCY DEPT       Machuca, Cornelius Tran MD  03/24/24 0529

## 2024-03-25 LAB
HGB BLD-MCNC: 8.2 G/DL (ref 13.3–17.7)
HGB BLD-MCNC: 8.2 G/DL (ref 13.3–17.7)
HGB BLD-MCNC: 8.8 G/DL (ref 13.3–17.7)
POTASSIUM SERPL-SCNC: 4 MMOL/L (ref 3.4–5.3)

## 2024-03-25 PROCEDURE — 85018 HEMOGLOBIN: CPT | Performed by: INTERNAL MEDICINE

## 2024-03-25 PROCEDURE — 36415 COLL VENOUS BLD VENIPUNCTURE: CPT | Performed by: INTERNAL MEDICINE

## 2024-03-25 PROCEDURE — C9113 INJ PANTOPRAZOLE SODIUM, VIA: HCPCS | Performed by: INTERNAL MEDICINE

## 2024-03-25 PROCEDURE — 120N000001 HC R&B MED SURG/OB

## 2024-03-25 PROCEDURE — 258N000003 HC RX IP 258 OP 636: Performed by: HOSPITALIST

## 2024-03-25 PROCEDURE — 250N000011 HC RX IP 250 OP 636: Performed by: INTERNAL MEDICINE

## 2024-03-25 PROCEDURE — 99233 SBSQ HOSP IP/OBS HIGH 50: CPT | Performed by: INTERNAL MEDICINE

## 2024-03-25 PROCEDURE — 84132 ASSAY OF SERUM POTASSIUM: CPT | Performed by: INTERNAL MEDICINE

## 2024-03-25 PROCEDURE — 258N000003 HC RX IP 258 OP 636: Performed by: INTERNAL MEDICINE

## 2024-03-25 PROCEDURE — 250N000013 HC RX MED GY IP 250 OP 250 PS 637: Performed by: INTERNAL MEDICINE

## 2024-03-25 RX ORDER — PANTOPRAZOLE SODIUM 40 MG/1
40 TABLET, DELAYED RELEASE ORAL
Status: DISCONTINUED | OUTPATIENT
Start: 2024-03-25 | End: 2024-03-28 | Stop reason: HOSPADM

## 2024-03-25 RX ADMIN — OXYCODONE HYDROCHLORIDE 5 MG: 5 TABLET ORAL at 09:42

## 2024-03-25 RX ADMIN — PANTOPRAZOLE SODIUM 40 MG: 40 TABLET, DELAYED RELEASE ORAL at 18:22

## 2024-03-25 RX ADMIN — OXYCODONE HYDROCHLORIDE 10 MG: 5 TABLET ORAL at 01:00

## 2024-03-25 RX ADMIN — ACETAMINOPHEN 650 MG: 325 TABLET, FILM COATED ORAL at 15:05

## 2024-03-25 RX ADMIN — OXYCODONE HYDROCHLORIDE 10 MG: 5 TABLET ORAL at 05:06

## 2024-03-25 RX ADMIN — ACETAMINOPHEN 650 MG: 325 TABLET, FILM COATED ORAL at 05:06

## 2024-03-25 RX ADMIN — ACETAMINOPHEN 650 MG: 325 TABLET, FILM COATED ORAL at 09:42

## 2024-03-25 RX ADMIN — ACETAMINOPHEN 650 MG: 325 TABLET, FILM COATED ORAL at 01:00

## 2024-03-25 RX ADMIN — OXYCODONE HYDROCHLORIDE 5 MG: 5 TABLET ORAL at 15:05

## 2024-03-25 RX ADMIN — PANTOPRAZOLE SODIUM 40 MG: 40 INJECTION, POWDER, FOR SOLUTION INTRAVENOUS at 09:42

## 2024-03-25 RX ADMIN — SODIUM CHLORIDE: 9 INJECTION, SOLUTION INTRAVENOUS at 07:25

## 2024-03-25 RX ADMIN — ACETAMINOPHEN 650 MG: 325 TABLET, FILM COATED ORAL at 19:52

## 2024-03-25 ASSESSMENT — ACTIVITIES OF DAILY LIVING (ADL)
ADLS_ACUITY_SCORE: 21
ADLS_ACUITY_SCORE: 20
ADLS_ACUITY_SCORE: 21

## 2024-03-25 NOTE — PROGRESS NOTES
"[facility]  [facility]  Gastroenterology Progress note    Interval History:      Patient reports epigastric pain 5/10.  No nausea.        Vital Signs:      /78   Pulse 99   Temp 98.2  F (36.8  C) (Oral)   Resp 17   Ht 1.702 m (5' 7\")   Wt 83.7 kg (184 lb 8.4 oz)   SpO2 94%   BMI 28.90 kg/m    Temp (24hrs), Av.8  F (37.1  C), Min:98.2  F (36.8  C), Max:99.5  F (37.5  C)    Patient Vitals for the past 72 hrs:   Weight   24 2340 83.7 kg (184 lb 8.4 oz)       Intake/Output Summary (Last 24 hours) at 3/25/2024 1042  Last data filed at 3/25/2024 0947  Gross per 24 hour   Intake 3400 ml   Output 1325 ml   Net 2075 ml         Constitutional: NAD, comfortable  Cardiovascular: RRR, normal S1, S2   Respiratory: CTAB  Abdomen: soft, + epigastric tenderness, nondistended    Additional Comments:  ROS, FH, SH: See initial GI consult for details.    Laboratory Data:  Recent Labs   Lab Test 24  0539 24  2149 24  1828 24  1231 24  1011 24  0524  0010 24  0554   WBC  --   --   --   --   --  7.2  --  19.8* 11.1*   HGB 8.8* 7.7* 8.2*   < > 7.9* 7.9*  7.9*   < > 10.5* 12.0*   MCV  --   --   --   --   --  88  --  87 90   PLT  --   --   --   --   --  168  --  346 135*   INR  --   --   --   --  1.23*  --   --   --   --     < > = values in this interval not displayed.     Recent Labs   Lab Test 24  0539 24  0528 24  0820   NA  --  139 139 138   POTASSIUM 4.0 4.1 3.8 4.5   CHLORIDE  --  103 100 101   CO2  --     BUN  --  14.9 19.1 12.5   CR  --  0.88 0.94 0.96   ANIONGAP  --  14 15 13   BUZZ  --  8.7 9.7 10.0     Recent Labs   Lab Test 24  0528 24  1731 24  0820 24  0554 24  0557 24  0510 24  2056   ALBUMIN 3.8 4.3  --  4.4   < > 3.5   < >  --    BILITOTAL 0.9 0.6  --  0.4   < > 1.6*   < >  --    DBIL  --   --   --   --   --  " 0.52*  --   --    ALT 21 26  --  50   < > 61   < >  --    AST 14 17  --  34   < > 57*   < >  --    ALKPHOS 63 79  --  64   < > 37*   < >  --    PROTEIN  --   --   --   --   --   --   --  Negative   LIPASE  --  884* 240* 127*  --  1,127*  --   --     < > = values in this interval not displayed.         Assessment:  27 yo male admitted due to presyncope/fall with dizziness.  Patient with acute pancreatitis 1 month ago secondary to alcohol.  He has been sober since.  Ongoing moderate upper abdominal pain with occasional flares.  He developed severe pain with nausea and vomiting 2 days ago and almost fell.  2 syncopal episodes in the ED prompted emergent CT which showed a large evolving necrotic collection with arterial bleeding.  Angiogram was negative.  No hematemesis, melena, hematochezia.  Pain improving.  Hemoglobin stable at 8.8 after one unit PRBC.  Plan:  -  Monitor H/H; transfuse prn.  -  If evidence of brisk bleeding, IR angiogram next step, consider empiric embolization.  -  Appreciate surgical input.  Recommend transfer to hospital with hepatobiliary surgery if decompensating.  -  IVF, clear liquids OK.  -  No role for endoscopic intervention.  -  No anticoagulants.    Total Time Spent: 15 minutes    WANG Lujan  [facility]  Office:  [phone] call if needed after 5PM  Cell:  [phone], not available after 5PM at this number

## 2024-03-25 NOTE — PROGRESS NOTES
General Surgery chart check:  Please see note from Dr. Kerr from yesterday for full details.    Patient is apparently showing no signs of active bleeding now.  If he starts bleeding again the plan is for an angiogram and attempted embolization.  As outlined by Dr. Kerr, if surgery is indicated, he needs to be transferred either to the [facility] or to [facility] or hepatobiliary surgeons can deal with this difficult problem.  This is not something that can be dealt with surgically here.  Given that, we will sign off.  Please call if any new questions or concerns.  Arabella Iraheta MD

## 2024-03-25 NOTE — PROGRESS NOTES
[facility]  Hospitalist Progress Note  Mundo Powell MD  03/25/2024    Assessment & Plan   Duane Ramos is a 28 year old male admitted on 3/23/2024. He presents with abdominal pain, syncope     Large peripancreatic necrotic fluid collection with hemorrhage  Small volume hemoperitoneum  Syncope  Recent alcohol induced pancreatitis  *Pt hospitalized 2/11-14 for pancreatitis 2/2 Etoh. CT at that time showed aucte interstitial pancreatitis.   *ongoing abd pain over last month. Has had 30 lb weight loss 2/2 poor appetite. Worsening pain PTA similar to previous and n/v. At least 2 syncopal episodes (in triage) and presyncopal events before. No f/c.   *in ED AFVSS, HR borderline tachy. Lipase 884 (previous 240 3/20). WBC 19.8.   *CT w/ active bleeding within large peripancreatic fluid collection described 3/11. Collection contains large amount of blood products, sl larger than previous. Also small volume hemoperitoneum  - no anticoagulants  - prn analgesics  - IR consult. Underwent IR angiogram with no evidence of active arterial hemorrhage from left gastric, splenic gastroduodenal artery, gastro diploic or hepatic arteries.  No embolization was performed.  -3/24 Hemoglobin continues to trend down was given 1 unit of packed RBCs was given on for hemoglobin less than 8.  - GI consult completed.  Reviewed notes.  Appreciate recommendations.  -Continue close monitoring and n.p.o. status for now.  If there are signs of increasing bleeding patient might have to undergo repeat IR procedure with empiric embolization.  - surgery consult noted and they have signed off  - Discontinued Zosyn on admission.  Pro-Jose within normal limits.  - pantoprazole 40 mg  po BID  - change hgb to every 8 hours     Acute blood loss anemia  Hgb 10.5 on presentation. Baseline recent 12.0 2/14/24 (was 16 on admission 2/11).   - monitor q 8 hrs  - type and cross, consent signed and in chart  - transfuse hgb<8 w/  "hemorrhage     Alcohol use disorder  Hepatic steatosis  LFTs normal on presentation. No alcohol since recent admission.   - encourage ongoing abstinence     Diet: clear liquid diet, advance to full liquids as tolerated  DVT Prophylaxis: Pneumatic Compression Devices and Ambulate every shift  Quevedo Catheter: Not present  Lines: None     Cardiac Monitoring: None  Code Status: Full CodeFull        Clinically Significant Risk Factors Present on Admission []Expand by Default               # Coagulation Defect: INR = 1.23 (Ref range: 0.85 - 1.15) and/or PTT = 31 Seconds (Ref range: 22 - 38 Seconds), will monitor for bleeding         # Overweight: Estimated body mass index is 28.9 kg/m  as calculated from the following:    Height as of this encounter: 1.702 m (5' 7\").    Weight as of this encounter: 83.7 kg (184 lb 8.4 oz).            Disposition:   -- anticipate on 3/27    Interval History   -- chart reviewed  -- GI and surgery notes reviewed  -- on room air, BP stable  -- he has had not bowel movement at all but passing flatus      -Data reviewed today: I reviewed all new labs and imaging over the last 24 hours. I personally reviewed no images or EKG's today.    Physical Exam    , Blood pressure (!) 140/74, pulse 93, temperature 98.2  F (36.8  C), temperature source Oral, resp. rate 22, height 1.702 m (5' 7\"), weight 83.7 kg (184 lb 8.4 oz), SpO2 96%.  Vitals:    03/23/24 2340   Weight: 83.7 kg (184 lb 8.4 oz)     Vital Signs with Ranges  Temp:  [98.2  F (36.8  C)-99.5  F (37.5  C)] 98.2  F (36.8  C)  Pulse:  [] 93  Resp:  [10-28] 22  BP: (122-141)/(69-84) 140/74  SpO2:  [91 %-98 %] 96 %  I/O's Last 24 hours  I/O last 3 completed shifts:  In: 4020 [P.O.:500; I.V.:2870]  Out: 1025 [Urine:1025]    Constitutional: Awake, alert, cooperative, no apparent distress  Respiratory: Clear to auscultation bilaterally, no crackles or wheezing  Cardiovascular: Regular rate and rhythm, normal S1 and S2, and no murmur noted  GI: " Normal bowel sounds, soft, non-distended, non-tender  Skin/Integumen: No rashes, no cyanosis, no edema  Other:      Medications   All medications were reviewed.   heparin      STATIN NOT PRESCRIBED      sodium chloride 125 mL/hr at 03/25/24 0725      pantoprazole  40 mg Intravenous BID    sodium chloride (PF)  3 mL Intracatheter Q8H        Data   Recent Labs   Lab 03/25/24  0539 03/24/24  2149 03/24/24  1828 03/24/24  1231 03/24/24  1011 03/24/24  0528 03/24/24  0010 03/23/24 2022 03/23/24 2002 03/20/24  1731   WBC  --   --   --   --   --  7.2  --   --  19.8*  --    HGB 8.8* 7.7* 8.2*   < > 7.9* 7.9*  7.9*   < >  --  10.5*  --    MCV  --   --   --   --   --  88  --   --  87  --    PLT  --   --   --   --   --  168  --   --  346  --    INR  --   --   --   --  1.23*  --   --   --   --   --    NA  --   --   --   --   --  139  --   --  139  --    POTASSIUM 4.0  --   --   --   --  4.1  --   --  3.8  --    CHLORIDE  --   --   --   --   --  103  --   --  100  --    CO2  --   --   --   --   --  22  --   --  24  --    BUN  --   --   --   --   --  14.9  --   --  19.1  --    CR  --   --   --   --   --  0.88  --   --  0.94  --    ANIONGAP  --   --   --   --   --  14  --   --  15  --    BUZZ  --   --   --   --   --  8.7  --   --  9.7  --    GLC  --   --   --   --   --  126*  --  157* 179*  --    ALBUMIN  --   --   --   --   --  3.8  --   --  4.3  --    PROTTOTAL  --   --   --   --   --  5.8*  --   --  6.8  --    BILITOTAL  --   --   --   --   --  0.9  --   --  0.6  --    ALKPHOS  --   --   --   --   --  63  --   --  79  --    ALT  --   --   --   --   --  21  --   --  26  --    AST  --   --   --   --   --  14  --   --  17  --    LIPASE  --   --   --   --   --   --   --   --  884* 240*    < > = values in this interval not displayed.       No results found for this or any previous visit (from the past 24 hour(s)).    Mundo Powell MD  Text Page  (7am to 6pm)

## 2024-03-25 NOTE — PLAN OF CARE
A/OX4. AVSS on room air. LS clear. Started on clear liquids, tolerating well. Up independently in the room. Abdominal pain managed with oxy and tylenol. Right groin site CDI. Voiding adequately. No BM. Hemoglobin 8.8 and 8.2. no plans for surgical intervention at this time. Monitoring hemoglobin 8hrs.

## 2024-03-25 NOTE — PROGRESS NOTES
"  Interventional Radiology - Progress Note  Inpatient - [facility]  3/25/2024    S: Pt found resting in bed. Right groin site C/D/I, mild tenderness, but no bleeding or lumps. Discussed angiogram, no questions.         O:  BP (!) 141/79   Pulse 101   Temp 98.2  F (36.8  C) (Oral)   Resp 17   Ht 1.702 m (5' 7\")   Wt 83.7 kg (184 lb 8.4 oz)   SpO2 93%   BMI 28.90 kg/m    General:  Stable.  In no acute distress.    Neuro:  A&O x 3. Moves all extremities equally.  Resp:  breathing unlabored on room air.  Vascular:  +2/4 right femoral pulse, right groin site C/D/I with dressing removed and open to air  Skin:  Without excoriations, ecchymosis, erythema, lesions or open sores on visible skin.  MSK:  No gross motor weakness.  Sensation intact.  Proprioception intact.    IMAGING:  [facility]  LOCATION: [facility]  DATE: 3/24/2024     PROCEDURE: Celiac angiogram, left gastric arteriogram, moderate  sedation     ATTENDING: Ezio Morrow MD     INDICATION: 28-year-old male with CT evidence of hemorrhage into a  pancreatic pseudocyst.     CONSENT: The risks, benefits and alternatives of the procedure were  discussed with the patient  in detail. All questions were answered.  Informed consent was given to proceed with the procedure.     MODERATE SEDATION: Versed 2.5 mg IV; Fentanyl 125 mcg IV.  Under  physician supervision, Versed and fentanyl were administered for  moderate sedation. Pulse oximetry, heart rate and blood pressure were  continuously monitored by an independent trained observer. The  physician spent 30 minutes of face-to-face sedation time with the  patient.     CONTRAST: 60 mL Isovue 300, intra-arterially.  ANTIBIOTICS: None.  ADDITIONAL MEDICATIONS: None.     FLUOROSCOPIC TIME: 3.1 minutes.  RADIATION DOSE: Air Kerma: 351 mGy.     COMPLICATIONS: No immediate complications.     STERILE BARRIER TECHNIQUE: Maximum sterile barrier technique was used.  Cutaneous antisepsis was performed " at the operative site with  application of 2% chlorhexidine and large sterile drape. Prior to the  procedure, the  and assistant performed hand hygiene and wore  hat, mask, sterile gown, and sterile gloves during the entire  procedure.     PROCEDURE:  The procedure, including the risks, benefits, and  alternatives to the procedure itself were discussed with the patient.  When all of their questions were answered informed written and verbal  consent was obtained. The patient was then brought to the  Interventional Radiology suite, placed in a supine position, and both  of the patient's groins were sterilely prepped and draped. The right  common femoral artery was noted to be ultrasound patent. After giving  local anesthesia with lidocaine, the right common femoral artery was  punctured with a 21 gauge needle, under ultrasound guidance with a  permanent image stored. A 0.018 inch wire advanced through the needle  into the external iliac artery under fluoroscopic guidance. The needle  was then exchanged over the wire for a 4 [place] coaxial dilator. The  inner 3 [place] dilator and 0.018 inch wire were then exchanged for a  0.035 inch guidewire. The outer 4 [place] dilator was then exchanged  over the guidewire for a 5 [place] vascular sheath.  A pressurized  heparinized saline drip was then administered through the side arm of  the sheath.      A 5 [place] Cobra catheter was advanced over a 0.035 inch angled  Glidewire  and used to select the celiac axis. Digital subtraction  angiography was performed. The Cobra catheter was then removed from  the celiac axis and used to select the left gastric artery which arose  separately from the abdominal aorta. Digital subtraction angiography  was performed.      Contrast was injected through the right femoral sheath. The right  femoral sheath was removed and hemostasis was achieved with use of a  Perclose arterial closure device. A sterile dressing was applied.      FINDINGS:  1.  Patent celiac, splenic, gastroduodenal, gastroepiploic, and left  gastric arteries.  2.  No evidence of active extravasation of contrast, pseudoaneurysm,  or early venous filling to suggest a source of acute arterial  hemorrhage.                                                                      IMPRESSION:    No evidence of acute arterial hemorrhage within the celiac,  gastroduodenal, gastroepiploic, or left gastric arterial distribution.     COLETTE CHEN MD     LABS:  CBC RESULTS:   Recent Labs   Lab Test 03/25/24  0539 03/24/24  1011 03/24/24  0528   WBC  --   --  7.2   RBC  --   --  2.82*   HGB 8.8*   < > 7.9*  7.9*   HCT  --   --  24.8*   MCV  --   --  88   MCH  --   --  28.0   MCHC  --   --  31.9   RDW  --   --  12.3   PLT  --   --  168    < > = values in this interval not displayed.      A: 28 year old male with admitted with abdominal pain, pancreatitis, and Large peripancreatic fluid collection with hemorrhage, S/P visceral angiogram 3/23 (NO extravasation or embolization done)    P:    - Procedure site left open to air  - Post angiogram cares discussed with patient. Questions answered.   - Post Angiogram D/C instructions entered into discharge navigator.  - IR will sign off, please re-consult if signs of active bleeding. IR usually recommends CTA prior to consideration of repeat angiogram to assess for site of bleeding  - The above discussed with patient. Questions answered  - Please contact IR with questions or concerns.    Total time spent on the date of the encounter is 20 minutes, including time spent counseling the patient, performing a medically appropriate evaluation, reviewing prior medical history, ordering medications and tests, documenting clinical information in the medical record, and communication of results.      Sara Seo PA-C  Interventional Radiology  [facility] IR PA desk phone *[phone]

## 2024-03-25 NOTE — PLAN OF CARE
AxOx4, pleasant; anxious at times. Tele = SR / ST w/ BBB.    VSS on RA, although required 2LNC while sleeping r/t positioning. SBA. NPO except meds + ice chips. Urinal at bedside with adequate output. No BM.    PIV x2, infusing NS at 125.    R groin site WDL-- soft and minimally tender.    No skin issues noted.    Pain managed with PRN Oxycodone 10mg x3, Tylenol x3, warm packs. Denies nausea / shortness of breath.    Hgb q4h: 8.2 at 1800 -> 7.7 at 2200 -> 8.8 at 0600.    1 unit of blood transfused. Hgb check at 0200 check deferred as blood transfusion completed at 0130.     Conditional transfuse orders in place for Hgb less than 8. Plan for repeat IR intervention and/or transfer for hepatobiliary surgery if decompensating. Continue plan of care.

## 2024-03-26 LAB
ANION GAP SERPL CALCULATED.3IONS-SCNC: 10 MMOL/L (ref 7–15)
BLD PROD TYP BPU: NORMAL
BLOOD COMPONENT TYPE: NORMAL
BUN SERPL-MCNC: 4.4 MG/DL (ref 6–20)
CALCIUM SERPL-MCNC: 8.4 MG/DL (ref 8.6–10)
CHLORIDE SERPL-SCNC: 107 MMOL/L (ref 98–107)
CODING SYSTEM: NORMAL
CREAT SERPL-MCNC: 0.75 MG/DL (ref 0.67–1.17)
CROSSMATCH: NORMAL
DEPRECATED HCO3 PLAS-SCNC: 22 MMOL/L (ref 22–29)
EGFRCR SERPLBLD CKD-EPI 2021: >90 ML/MIN/1.73M2
ERYTHROCYTE [DISTWIDTH] IN BLOOD BY AUTOMATED COUNT: 12.9 % (ref 10–15)
GLUCOSE SERPL-MCNC: 83 MG/DL (ref 70–99)
HCT VFR BLD AUTO: 24.1 % (ref 40–53)
HGB BLD-MCNC: 7.8 G/DL (ref 13.3–17.7)
HGB BLD-MCNC: 9.2 G/DL (ref 13.3–17.7)
ISSUE DATE AND TIME: NORMAL
MCH RBC QN AUTO: 28.4 PG (ref 26.5–33)
MCHC RBC AUTO-ENTMCNC: 32.4 G/DL (ref 31.5–36.5)
MCV RBC AUTO: 88 FL (ref 78–100)
PLATELET # BLD AUTO: 167 10E3/UL (ref 150–450)
POTASSIUM SERPL-SCNC: 3.4 MMOL/L (ref 3.4–5.3)
POTASSIUM SERPL-SCNC: 3.9 MMOL/L (ref 3.4–5.3)
RBC # BLD AUTO: 2.75 10E6/UL (ref 4.4–5.9)
SODIUM SERPL-SCNC: 139 MMOL/L (ref 135–145)
UNIT ABO/RH: NORMAL
UNIT NUMBER: NORMAL
UNIT STATUS: NORMAL
UNIT TYPE ISBT: 5100
WBC # BLD AUTO: 4.8 10E3/UL (ref 4–11)

## 2024-03-26 PROCEDURE — 250N000013 HC RX MED GY IP 250 OP 250 PS 637: Performed by: INTERNAL MEDICINE

## 2024-03-26 PROCEDURE — 80048 BASIC METABOLIC PNL TOTAL CA: CPT | Performed by: INTERNAL MEDICINE

## 2024-03-26 PROCEDURE — 36415 COLL VENOUS BLD VENIPUNCTURE: CPT | Performed by: INTERNAL MEDICINE

## 2024-03-26 PROCEDURE — 99232 SBSQ HOSP IP/OBS MODERATE 35: CPT | Performed by: INTERNAL MEDICINE

## 2024-03-26 PROCEDURE — 85027 COMPLETE CBC AUTOMATED: CPT | Performed by: INTERNAL MEDICINE

## 2024-03-26 PROCEDURE — P9016 RBC LEUKOCYTES REDUCED: HCPCS | Performed by: INTERNAL MEDICINE

## 2024-03-26 PROCEDURE — 84132 ASSAY OF SERUM POTASSIUM: CPT | Performed by: INTERNAL MEDICINE

## 2024-03-26 PROCEDURE — 258N000003 HC RX IP 258 OP 636: Performed by: INTERNAL MEDICINE

## 2024-03-26 PROCEDURE — 85018 HEMOGLOBIN: CPT | Performed by: INTERNAL MEDICINE

## 2024-03-26 PROCEDURE — 120N000001 HC R&B MED SURG/OB

## 2024-03-26 PROCEDURE — 80321 ALCOHOLS BIOMARKERS 1OR 2: CPT | Performed by: INTERNAL MEDICINE

## 2024-03-26 RX ORDER — POTASSIUM CHLORIDE 1500 MG/1
40 TABLET, EXTENDED RELEASE ORAL ONCE
Status: COMPLETED | OUTPATIENT
Start: 2024-03-26 | End: 2024-03-26

## 2024-03-26 RX ADMIN — DOCUSATE SODIUM 50 MG AND SENNOSIDES 8.6 MG 2 TABLET: 8.6; 5 TABLET, FILM COATED ORAL at 09:06

## 2024-03-26 RX ADMIN — POTASSIUM CHLORIDE 40 MEQ: 1500 TABLET, EXTENDED RELEASE ORAL at 10:51

## 2024-03-26 RX ADMIN — OXYCODONE HYDROCHLORIDE 5 MG: 5 TABLET ORAL at 17:24

## 2024-03-26 RX ADMIN — ACETAMINOPHEN 650 MG: 325 TABLET, FILM COATED ORAL at 00:16

## 2024-03-26 RX ADMIN — PANTOPRAZOLE SODIUM 40 MG: 40 TABLET, DELAYED RELEASE ORAL at 09:04

## 2024-03-26 RX ADMIN — ACETAMINOPHEN 650 MG: 325 TABLET, FILM COATED ORAL at 17:24

## 2024-03-26 RX ADMIN — DOCUSATE SODIUM 50 MG AND SENNOSIDES 8.6 MG 1 TABLET: 8.6; 5 TABLET, FILM COATED ORAL at 00:17

## 2024-03-26 RX ADMIN — OXYCODONE HYDROCHLORIDE 5 MG: 5 TABLET ORAL at 00:17

## 2024-03-26 RX ADMIN — ACETAMINOPHEN 650 MG: 325 TABLET, FILM COATED ORAL at 04:04

## 2024-03-26 RX ADMIN — ACETAMINOPHEN 650 MG: 325 TABLET, FILM COATED ORAL at 09:06

## 2024-03-26 RX ADMIN — SODIUM CHLORIDE: 9 INJECTION, SOLUTION INTRAVENOUS at 04:04

## 2024-03-26 RX ADMIN — PANTOPRAZOLE SODIUM 40 MG: 40 TABLET, DELAYED RELEASE ORAL at 17:24

## 2024-03-26 ASSESSMENT — ACTIVITIES OF DAILY LIVING (ADL)
ADLS_ACUITY_SCORE: 20

## 2024-03-26 NOTE — CARE PLAN
Orientations: A&Ox4   Vitals/Pain: VSS on RA    Lside abdominal pain controlled w/tylenol x2 and oxy x1 PRN  Tele: NSR  Lines/Drains: PIV infusing NS @75ml/hr  Skin/Wounds: R groin site CDI  GI/: voiding AUOP, -BM, +BS, clear liquid diet  Labs: Abnormal/Trends, Electrolyte Replacement- hgb 8.2 1unit PRBCs infusing  Ambulation/Assist: ind   Sleep Quality: good  Plan: no surgical intervention at this time, monitor hgb q8h, possible discharge 3/27

## 2024-03-26 NOTE — PROGRESS NOTES
"[facility]  [facility]  Gastroenterology Progress note    Interval History:      Pain stable.  No nausea, vomiting.  Tolerating clears.  Another unit of blood hanging for hemoglobin down to 7.8.      Vital Signs:      /78   Pulse 80   Temp 97.8  F (36.6  C) (Oral)   Resp 20   Ht 1.702 m (5' 7\")   Wt 83.7 kg (184 lb 8.4 oz)   SpO2 95%   BMI 28.90 kg/m    Temp (24hrs), Av  F (37.2  C), Min:97.8  F (36.6  C), Max:102.1  F (38.9  C)    Patient Vitals for the past 72 hrs:   Weight   24 2340 83.7 kg (184 lb 8.4 oz)       Intake/Output Summary (Last 24 hours) at 3/26/2024 0751  Last data filed at 3/25/2024 0947  Gross per 24 hour   Intake --   Output 300 ml   Net -300 ml         Constitutional: NAD, comfortable  Cardiovascular: RRR, normal S1, S2   Respiratory: CTAB  Abdomen: soft, non-tender, nondistended    Additional Comments:  ROS, FH, SH: See initial GI consult for details.    Laboratory Data:  Recent Labs   Lab Test 24  0536 24  2214 24  1403 24  1231 24  1011 24  0528 24  0010 24   WBC 4.8  --   --   --   --  7.2  --  19.8*   HGB 7.8* 8.2* 8.2*   < > 7.9* 7.9*  7.9*   < > 10.5*   MCV 88  --   --   --   --  88  --  87     --   --   --   --  168  --  346   INR  --   --   --   --  1.23*  --   --   --     < > = values in this interval not displayed.     Recent Labs   Lab Test 24  0536 24  0539 24  0528 24     --  139 139   POTASSIUM 3.4 4.0 4.1 3.8   CHLORIDE 107  --  103 100   CO2 22  --  22 24   BUN 4.4*  --  14.9 19.1   CR 0.75  --  0.88 0.94   ANIONGAP 10  --  14 15   BUZZ 8.4*  --  8.7 9.7     Recent Labs   Lab Test 24  0528 24  1731 24  0820 24  0554 24  0557 24  0510 24  2056   ALBUMIN 3.8 4.3  --  4.4   < > 3.5   < >  --    BILITOTAL 0.9 0.6  --  0.4   < > 1.6*   < >  --    DBIL  --   --   --   --   --  " 0.52*  --   --    ALT 21 26  --  50   < > 61   < >  --    AST 14 17  --  34   < > 57*   < >  --    ALKPHOS 63 79  --  64   < > 37*   < >  --    PROTEIN  --   --   --   --   --   --   --  Negative   LIPASE  --  884* 240* 127*  --  1,127*  --   --     < > = values in this interval not displayed.         Assessment:  29 yo male admitted due to presyncope/fall with dizziness.  Patient with acute pancreatitis 1 month ago secondary to alcohol.  He has been sober since.  Ongoing moderate upper abdominal pain with occasional flares.  He developed severe pain with nausea and vomiting 2 days ago and almost fell.  2 syncopal episodes in the ED prompted emergent CT which showed a large evolving necrotic collection with arterial bleeding.  Angiogram was negative. No hematemesis, melena, hematochezia.  Pain improving.  Hemoglobin continues to drift down, now 7.8.  1 unit PRBC transfusing.  Plan:  -  Monitor H/H; transfuse prn.  -  If evidence of brisk bleeding, IR angiogram next step, consider empiric embolization.  -  Appreciate surgical input.  Recommend transfer to hospital with hepatobiliary surgery if decompensating.  -  IVF, clear liquids OK.  -  No role for endoscopic intervention.  -  No anticoagulants.     Total Time Spent: 15 minutes      WANG Lujan  [facility]  Office:  [phone] call if needed after 5PM  Cell:  [phone], not available after 5PM at this number

## 2024-03-26 NOTE — PLAN OF CARE
DATE & TIME: 3/26/24 2753-5956    Cognitive Concerns/ Orientation : A/O x4   BEHAVIOR & AGGRESSION TOOL COLOR: green  CIWA SCORE: N/A   ABNL VS/O2: VSS on RA  MOBILITY: IND  PAIN MANAGMENT: 5/10. PRN tylenol/oxy availible  DIET: clears advanced to fulls  BOWEL/BLADDER: continent  ABNL LAB/BG: Hbg 7.8- 1 unit transfused 3/26  DRAIN/DEVICES: PIV x2  TELEMETRY RHYTHM: SR  SKIN: WDL, r groin site  TESTS/PROCEDURES: N/A  OTHER IMPORTANT INFO: Orders to transfuse if hemoglobin drops below 8.

## 2024-03-26 NOTE — TELEPHONE ENCOUNTER
FUTURE VISIT INFORMATION      FUTURE VISIT INFORMATION:  Date: 4/1/24  Time: 1:00 PM   Location: [facility] [place]   REFERRAL INFORMATION:  Referring provider:  Yarely Tee MD  Referring providers clinic:  [facility]  Reason for visit/diagnosis  K85.20 (ICD-10-CM) - Alcohol-induced acute pancreatitis without infection or necrosis R93.5 (ICD-10-CM) - Abnormal CT of the abdomen    RECORDS REQUESTED FROM:       Clinic name Comments Records Status Imaging Status   [facility]     Images requested:  CT Abdomen Pelvis - 4/15/21  PACS

## 2024-03-26 NOTE — PROGRESS NOTES
[facility]  Hospitalist Progress Note  Mundo Powell MD  03/26/2024    Assessment & Plan   Duane Ramos is a 28 year old male admitted on 3/23/2024. He presents with abdominal pain, syncope     Large peripancreatic necrotic fluid collection with hemorrhage  Small volume hemoperitoneum  Syncope  Recent alcohol induced pancreatitis  *Pt hospitalized 2/11-14 for pancreatitis 2/2 Etoh. CT at that time showed aucte interstitial pancreatitis.   *ongoing abd pain over last month. Has had 30 lb weight loss 2/2 poor appetite. Worsening pain PTA similar to previous and n/v. At least 2 syncopal episodes (in triage) and presyncopal events before. No f/c.   *in ED AFVSS, HR borderline tachy. Lipase 884 (previous 240 3/20). WBC 19.8.   *CT w/ active bleeding within large peripancreatic fluid collection described 3/11. Collection contains large amount of blood products, sl larger than previous. Also small volume hemoperitoneum  - no anticoagulants  - prn analgesics  - IR consult. Underwent IR angiogram with no evidence of active arterial hemorrhage from left gastric, splenic gastroduodenal artery, gastro diploic or hepatic arteries.  No embolization was performed.  -3/24 Hemoglobin continues to trend down was given 1 unit of packed RBCs was given on for hemoglobin less than 8.  - GI consult completed.  Reviewed notes.  Appreciate recommendations.  -Continue close monitoring and n.p.o. status for now.  If there are signs of increasing bleeding patient might have to undergo repeat IR procedure with empiric embolization.  - surgery consult noted and they have signed off  - Discontinued Zosyn on admission.  Pro-Jose within normal limits.  - pantoprazole 40 mg  po BID while inpatient  - hgb 8.2 > 7.8 and received 1 unit with Hgb now at 9.2  - he has multiple BM without any blood     Acute blood loss anemia  Hgb 10.5 on presentation. Baseline recent 12.0 2/14/24 (was 16 on admission 2/11).   - monitor q 8  "hrs  - type and cross, consent signed and in chart  - transfuse hgb<8 w/ hemorrhage, hgb 7.8 given 1 unit and at 9.2, suspect bleeding has stopped     Alcohol use disorder  Hepatic steatosis  LFTs normal on presentation. No alcohol since recent admission.   - encourage ongoing abstinence     Diet: advance to full liquids as tolerated  DVT Prophylaxis: Pneumatic Compression Devices and Ambulate every shift  [place] Catheter: Not present  Lines: None     Cardiac Monitoring: None  Code Status: Full CodeFull        Clinically Significant Risk Factors Present on Admission []Expand by Default               # Coagulation Defect: INR = 1.23 (Ref range: 0.85 - 1.15) and/or PTT = 31 Seconds (Ref range: 22 - 38 Seconds), will monitor for bleeding         # Overweight: Estimated body mass index is 28.9 kg/m  as calculated from the following:    Height as of this encounter: 1.702 m (5' 7\").    Weight as of this encounter: 83.7 kg (184 lb 8.4 oz).            Disposition:   -- anticipate on 3/27    Interval History   -- chart reviewed  -- GI and surgery notes reviewed  -- on room air, BP stable  -- he has had not bowel movement at all but passing flatus      -Data reviewed today: I reviewed all new labs and imaging over the last 24 hours. I personally reviewed no images or EKG's today.    Physical Exam    , Blood pressure 137/84, pulse 83, temperature 99.1  F (37.3  C), temperature source Oral, resp. rate 18, height 1.702 m (5' 7\"), weight 83.7 kg (184 lb 8.4 oz), SpO2 96%.  Vitals:    03/23/24 2340   Weight: 83.7 kg (184 lb 8.4 oz)     Vital Signs with Ranges  Temp:  [97.8  F (36.6  C)-102.1  F (38.9  C)] 99.1  F (37.3  C)  Pulse:  [] 83  Resp:  [16-21] 18  BP: (129-144)/(72-84) 137/84  SpO2:  [91 %-98 %] 96 %  I/O's Last 24 hours  I/O last 3 completed shifts:  In: -   Out: 300 [Urine:300]    Constitutional: Awake, alert, cooperative, no apparent distress  Respiratory: Clear to auscultation bilaterally, no crackles or " wheezing  Cardiovascular: Regular rate and rhythm, normal S1 and S2, and no murmur noted  GI: Normal bowel sounds, soft, non-distended, non-tender  Skin/Integumen: No rashes, no cyanosis, no edema  Other:      Medications   All medications were reviewed.   STATIN NOT PRESCRIBED        pantoprazole  40 mg Oral BID AC    sodium chloride (PF)  3 mL Intracatheter Q8H        Data   Recent Labs   Lab 03/26/24  1258 03/26/24  0536 03/25/24  2214 03/25/24  1403 03/25/24  0539 03/24/24  1231 03/24/24  1011 03/24/24  0528 03/24/24  0010 03/23/24 2022 03/23/24 2002 03/20/24  1731   0000   WBC  --  4.8  --   --   --   --   --  7.2  --   --  19.8*  --   --    HGB 9.2* 7.8* 8.2*   < > 8.8*   < > 7.9* 7.9*  7.9*   < >  --  10.5*  --   --    MCV  --  88  --   --   --   --   --  88  --   --  87  --   --    PLT  --  167  --   --   --   --   --  168  --   --  346  --   --    INR  --   --   --   --   --   --  1.23*  --   --   --   --   --   --    NA  --  139  --   --   --   --   --  139  --   --  139  --   --    POTASSIUM  --  3.4  --   --  4.0  --   --  4.1  --   --  3.8  --    < >   CHLORIDE  --  107  --   --   --   --   --  103  --   --  100  --   --    CO2  --  22  --   --   --   --   --  22  --   --  24  --   --    BUN  --  4.4*  --   --   --   --   --  14.9  --   --  19.1  --   --    CR  --  0.75  --   --   --   --   --  0.88  --   --  0.94  --   --    ANIONGAP  --  10  --   --   --   --   --  14  --   --  15  --   --    BUZZ  --  8.4*  --   --   --   --   --  8.7  --   --  9.7  --   --    GLC  --  83  --   --   --   --   --  126*  --  157* 179*  --   --    ALBUMIN  --   --   --   --   --   --   --  3.8  --   --  4.3  --   --    PROTTOTAL  --   --   --   --   --   --   --  5.8*  --   --  6.8  --   --    BILITOTAL  --   --   --   --   --   --   --  0.9  --   --  0.6  --   --    ALKPHOS  --   --   --   --   --   --   --  63  --   --  79  --   --    ALT  --   --   --   --   --   --   --  21  --   --  26  --   --    AST  --   --    --   --   --   --   --  14  --   --  17  --   --    LIPASE  --   --   --   --   --   --   --   --   --   --  884* 240*  --     < > = values in this interval not displayed.       No results found for this or any previous visit (from the past 24 hour(s)).    Mundo Powell MD  Text Page  (7am to 6pm)

## 2024-03-26 NOTE — PROGRESS NOTES
A/OX4. AVSS on room air.Tele NSR.  LS clear. Clear liquids, tolerating well. Up independently in the room. Abdominal pain managed with oxy and tylenol. Right groin site WDL. Voiding adequately. No BM. Hemoglobin 7.8 this AM. Blood transfusion finished at 0930. Recheck for 1300. Awaiting from surgical to see patient today. Transferred to  this morning. Report given to RN.

## 2024-03-27 LAB
HGB BLD-MCNC: 9 G/DL (ref 13.3–17.7)
HGB BLD-MCNC: 9.4 G/DL (ref 13.3–17.7)
POTASSIUM SERPL-SCNC: 3.7 MMOL/L (ref 3.4–5.3)

## 2024-03-27 PROCEDURE — 84132 ASSAY OF SERUM POTASSIUM: CPT | Performed by: INTERNAL MEDICINE

## 2024-03-27 PROCEDURE — 36415 COLL VENOUS BLD VENIPUNCTURE: CPT | Performed by: INTERNAL MEDICINE

## 2024-03-27 PROCEDURE — 120N000001 HC R&B MED SURG/OB

## 2024-03-27 PROCEDURE — 250N000013 HC RX MED GY IP 250 OP 250 PS 637: Performed by: INTERNAL MEDICINE

## 2024-03-27 PROCEDURE — 85018 HEMOGLOBIN: CPT | Performed by: HOSPITALIST

## 2024-03-27 PROCEDURE — 99232 SBSQ HOSP IP/OBS MODERATE 35: CPT | Performed by: STUDENT IN AN ORGANIZED HEALTH CARE EDUCATION/TRAINING PROGRAM

## 2024-03-27 PROCEDURE — 85018 HEMOGLOBIN: CPT | Performed by: INTERNAL MEDICINE

## 2024-03-27 RX ADMIN — OXYCODONE HYDROCHLORIDE 5 MG: 5 TABLET ORAL at 19:51

## 2024-03-27 RX ADMIN — PANTOPRAZOLE SODIUM 40 MG: 40 TABLET, DELAYED RELEASE ORAL at 06:35

## 2024-03-27 RX ADMIN — OXYCODONE HYDROCHLORIDE 5 MG: 5 TABLET ORAL at 05:21

## 2024-03-27 RX ADMIN — PANTOPRAZOLE SODIUM 40 MG: 40 TABLET, DELAYED RELEASE ORAL at 16:39

## 2024-03-27 RX ADMIN — ACETAMINOPHEN 650 MG: 325 TABLET, FILM COATED ORAL at 19:52

## 2024-03-27 ASSESSMENT — ACTIVITIES OF DAILY LIVING (ADL)
ADLS_ACUITY_SCORE: 20

## 2024-03-27 NOTE — PLAN OF CARE
Goal Outcome Evaluation:       DATE & TIME: 3/26/24-3/27/24 4039-0991    Cognitive Concerns/ Orientation : A/O x4   BEHAVIOR & AGGRESSION TOOL COLOR: green  CIWA SCORE: N/A   ABNL VS/O2: VSS on RA  MOBILITY: IND  PAIN MANAGMENT: Gave PRN Oxycodone x1 for abdominal pain  DIET: full liquid  BOWEL/BLADDER: continent  ABNL LAB/BG: Hgb 9.2. Recheck in AM  DRAIN/DEVICES: PIV x2 SL  TELEMETRY RHYTHM: SR  SKIN:  R groin site intact  TESTS/PROCEDURES: N/A  OTHER IMPORTANT INFO: Orders to transfuse if hemoglobin drops below 8.

## 2024-03-27 NOTE — PLAN OF CARE
Goal Outcome Evaluation:      Plan of Care Reviewed With: patient    SUMMARY: abdominal pain, syncope  Large peripancreatic necrotic fluid collection with hemorrhage  DATE & TIME: 3/27/24 0354-0950  Cognitive Concerns/ Orientation : A/O x4   BEHAVIOR & AGGRESSION TOOL COLOR: green  CIWA SCORE: N/A   ABNL VS/O2: VSS on RA  MOBILITY: IND  PAIN MANAGMENT: Abdominal discomfort but declined intervention  DIET: Low Fat  BOWEL/BLADDER: continent x2  ABNL LAB/BG: Hgb 9.0  DRAIN/DEVICES: PIV SL  TELEMETRY RHYTHM: NSR- discontinued  SKIN:  R groin site intact  TESTS/PROCEDURES: N/A  DISCHARGE: Most likely tomorrow 3/28 pending hgb and tolerating diet  OTHER IMPORTANT INFO: Orders to transfuse if hemoglobin drops below 8.

## 2024-03-27 NOTE — PROGRESS NOTES
[facility]  Hospitalist Progress Note    David Lin MD  03/27/2024    Assessment & Plan   Duane Ramos is a 28 year old male admitted on 3/23/2024. He presents with abdominal pain, syncope     Large peripancreatic necrotic fluid collection with hemorrhage  Small volume hemoperitoneum  Syncope  Recent alcohol induced pancreatitis  *Pt hospitalized 2/11-14 for pancreatitis 2/2 Etoh. CT at that time showed aucte interstitial pancreatitis.   *ongoing abd pain over last month. Has had 30 lb weight loss 2/2 poor appetite. Worsening pain PTA similar to previous and n/v. At least 2 syncopal episodes (in triage) and presyncopal events before. No f/c.   *in ED AFVSS, HR borderline tachy. Lipase 884 (previous 240 3/20). WBC 19.8.   *CT w/ active bleeding within large peripancreatic fluid collection described 3/11. Collection contains large amount of blood products, sl larger than previous. Also small volume hemoperitoneum  - no anticoagulants  - prn analgesics  - IR consult. Underwent IR angiogram with no evidence of active arterial hemorrhage from left gastric, splenic gastroduodenal artery, gastro diploic or hepatic arteries.  No embolization was performed.  -3/24 Hemoglobin continues to trend down was given 1 unit of packed RBCs was given on for hemoglobin less than 8.  - GI consult completed.  Reviewed notes.  Appreciate recommendations.  -Continue close monitoring and n.p.o. status for now.  If there are signs of increasing bleeding patient might have to undergo repeat IR procedure with empiric embolization.  - surgery consult noted and they have signed off  - Discontinued Zosyn on admission.  Pro-Jose within normal limits.  - pantoprazole 40 mg  po BID while inpatient  - hgb 8.2 > 7.8 and received 1 unit with Hgb now at 9.2 -> 9.0 today  - he has multiple BM without any blood  - Advance diet to low fat, if tolerates -> home in AM     Acute blood loss anemia  Hgb 10.5 on presentation. Baseline  "recent 12.0 2/14/24 (was 16 on admission 2/11).   - monitor q 8 hrs  - type and cross, consent signed and in chart  - transfuse hgb<8 w/ hemorrhage, hgb 7.8 given 1 unit and at 9.2, suspect bleeding has stopped     Alcohol use disorder  Hepatic steatosis  LFTs normal on presentation. No alcohol since recent admission.   - encourage ongoing abstinence     Diet: advance to full liquids as tolerated  DVT Prophylaxis: Pneumatic Compression Devices and Ambulate every shift  Quevedo Catheter: Not present  Lines: None     Cardiac Monitoring: None  Code Status: Full CodeFull        Clinically Significant Risk Factors Present on Admission                # Coagulation Defect: INR = 1.23 (Ref range: 0.85 - 1.15) and/or PTT = 31 Seconds (Ref range: 22 - 38 Seconds), will monitor for bleeding         # Overweight: Estimated body mass index is 28.9 kg/m  as calculated from the following:    Height as of this encounter: 1.702 m (5' 7\").    Weight as of this encounter: 83.7 kg (184 lb 8.4 oz).            Disposition:   -- anticipate on 3/28    Medical Decision Making       -------------------------- BILLING ON TIME ------------------------------------------------------------------------------------------------------------  37 MINUTES SPENT BY ME on the date of service doing chart review, history, exam, documentation & further activities per the note.        Interval History     -- chart reviewed, no acute events overnight  -- GI and surgery notes reviewed  -- No fevers  --  Had BMs last evening  -- Still with mild left sided abdominal pain, but overall unchagned compared to day prior  -- No nausea / vomiting  -- Tolerating diet advancement    -Data reviewed today: I reviewed all new labs and imaging over the last 24 hours. I personally reviewed no images or EKG's today.    Physical Exam    , Blood pressure 128/88, pulse 74, temperature 98.1  F (36.7  C), temperature source Oral, resp. rate 16, height 1.702 m (5' 7\"), weight 83.7 kg " (184 lb 8.4 oz), SpO2 97%.  Vitals:    03/23/24 2340   Weight: 83.7 kg (184 lb 8.4 oz)     Vital Signs with Ranges  Temp:  [98.1  F (36.7  C)-99.4  F (37.4  C)] 98.1  F (36.7  C)  Pulse:  [74-85] 74  Resp:  [16-18] 16  BP: (128-135)/(85-88) 128/88  SpO2:  [96 %-99 %] 97 %  I/O's Last 24 hours  I/O last 3 completed shifts:  In: 420 [P.O.:120]  Out: 650 [Urine:650]    Constitutional: Awake, alert, cooperative, no apparent distress  Respiratory: Clear to auscultation bilaterally, no crackles or wheezing  Cardiovascular: Regular rate and rhythm, normal S1 and S2, and no murmur noted  GI: Normal bowel sounds, soft, non-distended, non-tender  Skin/Integumen: No rashes, no cyanosis, no edema  Other: Normal mood and affect    Medications   All medications were reviewed.   STATIN NOT PRESCRIBED        pantoprazole  40 mg Oral BID AC    sodium chloride (PF)  3 mL Intracatheter Q8H        Data   Recent Labs   Lab 03/27/24  0756 03/26/24  1557 03/26/24  1258 03/26/24  0536 03/24/24  1231 03/24/24  1011 03/24/24  0528 03/24/24  0010 03/23/24  2022 03/23/24 2002 03/20/24  1731   0000   WBC  --   --   --  4.8  --   --  7.2  --   --  19.8*  --   --    HGB 9.0*  --  9.2* 7.8*   < > 7.9* 7.9*  7.9*   < >  --  10.5*  --   --    MCV  --   --   --  88  --   --  88  --   --  87  --   --    PLT  --   --   --  167  --   --  168  --   --  346  --   --    INR  --   --   --   --   --  1.23*  --   --   --   --   --   --    NA  --   --   --  139  --   --  139  --   --  139  --   --    POTASSIUM 3.7 3.9  --  3.4   < >  --  4.1  --   --  3.8  --    < >   CHLORIDE  --   --   --  107  --   --  103  --   --  100  --   --    CO2  --   --   --  22  --   --  22  --   --  24  --   --    BUN  --   --   --  4.4*  --   --  14.9  --   --  19.1  --   --    CR  --   --   --  0.75  --   --  0.88  --   --  0.94  --   --    ANIONGAP  --   --   --  10  --   --  14  --   --  15  --   --    BUZZ  --   --   --  8.4*  --   --  8.7  --   --  9.7  --   --    GLC  --    --   --  83  --   --  126*  --  157* 179*  --   --    ALBUMIN  --   --   --   --   --   --  3.8  --   --  4.3  --   --    PROTTOTAL  --   --   --   --   --   --  5.8*  --   --  6.8  --   --    BILITOTAL  --   --   --   --   --   --  0.9  --   --  0.6  --   --    ALKPHOS  --   --   --   --   --   --  63  --   --  79  --   --    ALT  --   --   --   --   --   --  21  --   --  26  --   --    AST  --   --   --   --   --   --  14  --   --  17  --   --    LIPASE  --   --   --   --   --   --   --   --   --  884* 240*  --     < > = values in this interval not displayed.       No results found for this or any previous visit (from the past 24 hour(s)).    LAUREN MARTINEZ MD  [facility]  03/27/2024

## 2024-03-27 NOTE — CONSULTS
"BRIEF NUTRITION ASSESSMENT      REASON FOR ASSESSMENT:  Duane Ramos is a 28 year old male seen by Registered Dietitian for Nutrition Admission Risk Screen Received -   Have you recently lost weight without trying ? - \"yes 24-33 pounds\"  Have you been eating poorly due to a decreased appetite ?- \"yes\"    NUTRITION HISTORY:  Pt reports about 1 month of pancreatitis related pain. He was diagnosed about 1 month ago, and since had been following a low fat diet and quit drinking. He suspects that his change in diet to less calorie-dense foods, and elimination of empty calories from alcohol is the main  in his weight loss. He denies eating less volume than usual. Appetite was not an issue.     CURRENT DIET AND INTAKE:  Diet: Low Fat Diet        Slept in, just ordered his first meal of the day. Feeling good today. Yesterday tolerated 3 liquid trays without issue, per his report.     ANTHROPOMETRICS:  Height: 5' 7\"  Weight:  184 lbs 8.4 oz (83.7 kg)   Body mass index is 28.9 kg/m .   Weight Status: Overweight BMI 25-29.9  IBW:  67.3 kg   %IBW: 124%  Weight History: ~9.5% wt loss in 1-2 months. Pt reports largely related to a shift to healthier diet choices.   Wt Readings from Last 10 Encounters:   03/23/24 83.7 kg (184 lb 8.4 oz)   03/20/24 82.1 kg (181 lb)   02/29/24 85.3 kg (188 lb)   02/15/24 92.5 kg (204 lb)   02/11/24 90.7 kg (200 lb)   11/18/17 74.2 kg (163 lb 8 oz)       LABS:  Reviewed    MALNUTRITION:  Visual Nutrition Focused Physical Assessment (NFPA) completed.   Patient does not meet two of the following criteria necessary for diagnosing malnutrition.     % Weight Loss:  > 7.5% in 3 months - related to healthy diet changes.  % Intake:  Decreased intake does not meet criteria for malnutrition   Subcutaneous Fat Loss:  None observed  Muscle Loss:  None observed  Fluid Retention:  None noted    NUTRITION INTERVENTION:  Nutrition Diagnosis:  Inadequate oral intake related to altered GI function as evidenced " by diet restrictions this admission, now on low fat diet after 3 days of liquid diet.     Implementation:  Nutrition Education:    Assessed learning needs, learning preferences, and willingness to learn  Nutrition Education (Content):  Provided handout   Low Fat Nutrition Therapy  Fat Content of Menu Items  Nutrition Education (Application):  Pt declined full ed, as he was already following diet at home.   Patient verbalizes understanding of diet   Anticipate good compliance  Diet Education - refer to Education Flowsheet      FOLLOW UP/MONITORING:   Will re-evaluate in 7 - 10 days, or sooner, if re-consulted.    Jacque Marie RD, LD  Pager: [phone]  Weekend Pager: [phone]

## 2024-03-28 VITALS
TEMPERATURE: 98.4 F | SYSTOLIC BLOOD PRESSURE: 143 MMHG | BODY MASS INDEX: 28.96 KG/M2 | RESPIRATION RATE: 16 BRPM | DIASTOLIC BLOOD PRESSURE: 95 MMHG | OXYGEN SATURATION: 95 % | WEIGHT: 184.53 LBS | HEART RATE: 75 BPM | HEIGHT: 67 IN

## 2024-03-28 LAB
ANION GAP SERPL CALCULATED.3IONS-SCNC: 13 MMOL/L (ref 7–15)
BUN SERPL-MCNC: 6.5 MG/DL (ref 6–20)
CALCIUM SERPL-MCNC: 9.4 MG/DL (ref 8.6–10)
CHLORIDE SERPL-SCNC: 103 MMOL/L (ref 98–107)
CHOLEST SERPL-MCNC: 133 MG/DL
CREAT SERPL-MCNC: 0.74 MG/DL (ref 0.67–1.17)
CRP SERPL-MCNC: 106.64 MG/L
DEPRECATED HCO3 PLAS-SCNC: 23 MMOL/L (ref 22–29)
EGFRCR SERPLBLD CKD-EPI 2021: >90 ML/MIN/1.73M2
ERYTHROCYTE [DISTWIDTH] IN BLOOD BY AUTOMATED COUNT: 12.7 % (ref 10–15)
GLUCOSE SERPL-MCNC: 103 MG/DL (ref 70–99)
HBA1C MFR BLD: 5.3 %
HCT VFR BLD AUTO: 29.5 % (ref 40–53)
HDLC SERPL-MCNC: 17 MG/DL
HGB BLD-MCNC: 9.7 G/DL (ref 13.3–17.7)
LABORATORY REPORT: NORMAL
LDLC SERPL CALC-MCNC: 80 MG/DL
MCH RBC QN AUTO: 28.4 PG (ref 26.5–33)
MCHC RBC AUTO-ENTMCNC: 32.9 G/DL (ref 31.5–36.5)
MCV RBC AUTO: 86 FL (ref 78–100)
NONHDLC SERPL-MCNC: 116 MG/DL
PETH INTERPRETATION: NORMAL
PLATELET # BLD AUTO: 235 10E3/UL (ref 150–450)
PLPETH BLD-MCNC: 25 NG/ML
POPETH BLD-MCNC: 29 NG/ML
POTASSIUM SERPL-SCNC: 3.7 MMOL/L (ref 3.4–5.3)
RBC # BLD AUTO: 3.42 10E6/UL (ref 4.4–5.9)
SODIUM SERPL-SCNC: 139 MMOL/L (ref 135–145)
TRIGL SERPL-MCNC: 180 MG/DL
WBC # BLD AUTO: 5.1 10E3/UL (ref 4–11)

## 2024-03-28 PROCEDURE — 86140 C-REACTIVE PROTEIN: CPT | Performed by: INTERNAL MEDICINE

## 2024-03-28 PROCEDURE — 99239 HOSP IP/OBS DSCHRG MGMT >30: CPT | Performed by: HOSPITALIST

## 2024-03-28 PROCEDURE — 80048 BASIC METABOLIC PNL TOTAL CA: CPT | Performed by: STUDENT IN AN ORGANIZED HEALTH CARE EDUCATION/TRAINING PROGRAM

## 2024-03-28 PROCEDURE — 83036 HEMOGLOBIN GLYCOSYLATED A1C: CPT | Performed by: INTERNAL MEDICINE

## 2024-03-28 PROCEDURE — 80061 LIPID PANEL: CPT | Performed by: INTERNAL MEDICINE

## 2024-03-28 PROCEDURE — 85027 COMPLETE CBC AUTOMATED: CPT | Performed by: STUDENT IN AN ORGANIZED HEALTH CARE EDUCATION/TRAINING PROGRAM

## 2024-03-28 PROCEDURE — 250N000013 HC RX MED GY IP 250 OP 250 PS 637: Performed by: INTERNAL MEDICINE

## 2024-03-28 PROCEDURE — 36415 COLL VENOUS BLD VENIPUNCTURE: CPT | Performed by: STUDENT IN AN ORGANIZED HEALTH CARE EDUCATION/TRAINING PROGRAM

## 2024-03-28 RX ORDER — ACETAMINOPHEN 325 MG/1
650 TABLET ORAL EVERY 8 HOURS PRN
COMMUNITY
Start: 2024-03-28

## 2024-03-28 RX ADMIN — ACETAMINOPHEN 650 MG: 325 TABLET, FILM COATED ORAL at 00:48

## 2024-03-28 RX ADMIN — PANTOPRAZOLE SODIUM 40 MG: 40 TABLET, DELAYED RELEASE ORAL at 06:18

## 2024-03-28 RX ADMIN — ACETAMINOPHEN 650 MG: 325 TABLET, FILM COATED ORAL at 08:26

## 2024-03-28 RX ADMIN — OXYCODONE HYDROCHLORIDE 5 MG: 5 TABLET ORAL at 00:48

## 2024-03-28 ASSESSMENT — ACTIVITIES OF DAILY LIVING (ADL)
ADLS_ACUITY_SCORE: 20

## 2024-03-28 NOTE — DISCHARGE SUMMARY
[facility]    Discharge Summary  Hospitalist    Date of Admission:  3/23/2024  Date of Discharge:  3/28/2024  Discharging Provider: Omar Pack MD  Date of Service (when I saw the patient): 03/28/24      History of Present Illness   Duane Ramos is a 28 year old male admitted on 3/23/2024. He presents with abdominal pain,     Hospital Course   Duane Ramos was admitted on 3/23/2024.  The following problems were addressed during his hospitalization:    Large peripancreatic necrotic fluid collection with hemorrhage  Small volume hemoperitoneum  Syncope  Recent alcohol induced pancreatitis  *Pt hospitalized 2/11-14 for pancreatitis 2/2 Etoh. CT at that time showed aucte interstitial pancreatitis.   *ongoing abd pain over last month. Has had 30 lb weight loss 2/2 poor appetite. Worsening pain PTA similar to previous and n/v. At least 2 syncopal episodes (in triage) and presyncopal events before. No f/c.   *in ED AFVSS, HR borderline tachy. Lipase 884 (previous 240 3/20). WBC 19.8.   *CT w/ active bleeding within large peripancreatic fluid collection described 3/11. Collection contains large amount of blood products, sl larger than previous. Also small volume hemoperitoneum  - no anticoagulants  - IR consult. Underwent IR angiogram with no evidence of active arterial hemorrhage from left gastric, splenic gastroduodenal artery, gastro diploic or hepatic arteries.  No embolization was performed.  -3/24 Hemoglobin continues to trend down to ow 7.7; 105. On admisstion;  was given 1 unit of packed RBCs was given on for hemoglobin less than 8.  - GI consult; see notes; arranged [facility] GI follow-up on 4/1/24.   - Discontinued Zosyn on admission.  Pro-Jose within normal limits.  - post transfusion 8.8;; stable since then, discharge 3/28/24 at 9.7.  - he has multiple BM without any blood  - Advance diet to low fat, tolerated; as above hgb stable; 9.7 on discharge.       Acute blood loss  anemia  Hgb 10.5 on presentation. Baseline recent 12.0 2/14/24 (was 16 on admission 2/11).   -Hemoglobin low 7.7, given 1 unit transfusion PRBC, posttransfusion 8.8, since then stable on discharge 9.7.  Follow-up hemoglobin on GI follow-up for/1/24     Alcohol use disorder  Hepatic steatosis  LFTs normal on presentation. No alcohol since recent admission.   - encourage ongoing abstinence, patient states he has been sober since [place] [employer] [place] which is from first week of February.  Patient sounds sincere on discontinuing alcohol.       Pending Results   These results will be followed up by [Ordering Provider]   Unresulted Labs Ordered in the Past 30 Days of this Admission       Date and Time Order Name Status Description    3/28/2024 12:46 PM Lipid Profile In process     3/28/2024 12:45 PM CRP inflammation In process     3/26/2024  1:16 PM Phosphatidylethanol (PEth), Whole Blood In process             Code Status   Full Code       Primary Care Physician   Rogelio Mcdonough    Physical Exam   Temp: 98.4  F (36.9  C) Temp src: Oral BP: (!) 143/95 Pulse: 75   Resp: 16 SpO2: 95 % O2 Device: None (Room air)    Vitals:    03/23/24 2340   Weight: 83.7 kg (184 lb 8.4 oz)     General/Constitutional:   NAD, alert, calm, cooperative    Chest/Respiratory: Respirations nonlabored room air  Cardiovascular:  regular,   Gastrointestinal/Abdomen:  soft, nontender, no rebound, guarding or other peritoneal signs.  Neuro.  Gross motor tested, nonfocal,  Psych oriented, affect calm      Discharge Disposition   Discharged to home  Condition at discharge: Fair    Consultations This Hospital Stay   GASTROENTEROLOGY IP CONSULT  SURGERY GENERAL IP CONSULT    Time Spent on this Encounter   IOmar MD, personally saw the patient today and spent greater than 30 minutes discharging this patient discussing discharge plans with patient and Nursing, coordinating with specialist, Gastroenterology regarding discharge follow-up, complaint  discharge orders communications about.    Discharge Orders      Brief Discharge Instructions    Angiogram Discharge Instructions:  You had an angiogram procedure.  An angiogram is a procedure that uses x-rays to take pictures of your blood vessels. A long, flexible tube or catheter is inserted through the blood stream (through the procedure site) to help deliver contrast (dye) into the arteries so they can be visible on the x-ray. Angiograms are used to evaluate possible blockages in the arterial system. Please follow the below instructions after your angiogram, including monitoring of your procedure site.    Care instructions after angiogram procedure:  -  If you received sedation for your procedure, do not drive or operate heavy machinery for the rest of the day.  -  Do not lift objects greater than 10 pounds for 2 days following angiogram procedure.  -  Avoid excessive exercise and straining for 2 days.   -  Avoid tub baths, pools, hot tubs and Jacuzzis for 3 days or until procedure site is well healed.   -  You may shower beginning tomorrow. Do not scrub procedure site until well healed; pat dry.  -  Return to your normal activities as you tolerate after the 2 day restriction.  -  You can expect to return to work 1-2 days after your procedure - depending on the nature of your profession.  -  It is normal to have some tenderness and minimal swelling at procedure puncture site. A small area of discoloration may be present. Tenderness typically subsides in 1-2 days. A small knot may also be present at puncture site for 6-8 weeks. This can be a normal part of the healing process.     Follow up:  - Follow up with your vascular surgeon or the ordering provider. [facility] may contact you to help arrange for additional follow up.    Please seek medical evaluation for:  - If you develop fevers (greater than 101 F (38.3C)).  - If you develop increasing pain, redness, purulent drainage, tenderness, or swelling at  procedure site.   - If you experience any bleeding from procedure/puncture site: lie down, firmly apply pressure to puncture site and call 911.  - Seek emergent evaluation if you experience any new leg/arm pain, discoloration or numbness.      Please call Jadyn JOEL RN clinician at  or Ashley JOEL NP at  for questions or concerns about the procedure or post-procedure care. You can leave a voicemail. We work Monday through Friday [phone] or [phone].     An alternative number to call for questions is Interventional Radiology at      Reason for your hospital stay    Presented with abdominal pain and low blood count anemia.     Follow-up and recommended labs and tests     Follow up with Gastroenterology [GI] as scheduled for 4/1/24; hemoglobin lab test at that time    Follow up with primary care provider, Rogelio Mcdonough as needed, follow-up hemoglobin per GI     Activity    Your activity upon discharge: activity as tolerated     Diet    Follow this diet upon discharge:      Low Fat Diet (up to 50g)     Discharge Medications   Current Discharge Medication List        START taking these medications    Details   acetaminophen (TYLENOL) 325 MG tablet Take 2 tablets (650 mg) by mouth every 8 hours as needed for mild pain or other (and adjunct with moderate or severe pain or per patient request)    Associated Diagnoses: Pain           STOP taking these medications       ibuprofen (ADVIL/MOTRIN) 200 MG tablet Comments:   Reason for Stopping:             Allergies   No Known Allergies  Data   Most Recent 3 CBC's:  Recent Labs   Lab Test 03/28/24  0800 03/27/24  1929 03/27/24  0756 03/26/24  1258 03/26/24  0536 03/24/24  1011 03/24/24  0528   WBC 5.1  --   --   --  4.8  --  7.2   HGB 9.7* 9.4* 9.0*   < > 7.8*   < > 7.9*  7.9*   MCV 86  --   --   --  88  --  88     --   --   --  167  --  168    < > = values in this interval not displayed.      Most Recent 3 BMP's:  Recent Labs   Lab Test  03/28/24  0800 03/27/24  0756 03/26/24  1557 03/26/24  0536 03/25/24  0539 03/24/24  0528     --   --  139  --  139   POTASSIUM 3.7 3.7 3.9 3.4   < > 4.1   CHLORIDE 103  --   --  107  --  103   CO2 23  --   --  22  --  22   BUN 6.5  --   --  4.4*  --  14.9   CR 0.74  --   --  0.75  --  0.88   ANIONGAP 13  --   --  10  --  14   BUZZ 9.4  --   --  8.4*  --  8.7   *  --   --  83  --  126*    < > = values in this interval not displayed.     Most Recent 2 LFT's:  Recent Labs   Lab Test 03/24/24  0528 03/23/24 2002   AST 14 17   ALT 21 26   ALKPHOS 63 79   BILITOTAL 0.9 0.6       Recent Labs   Lab Test 03/28/24 0800   A1C 5.3

## 2024-03-28 NOTE — PLAN OF CARE
Goal Outcome Evaluation:         DATE & TIME: 3/28/24 6651-2015  Cognitive Concerns/ Orientation : A/O x4   BEHAVIOR & AGGRESSION TOOL COLOR: green  CIWA SCORE: N/A   ABNL VS/O2: VSS on RA  MOBILITY: IND  PAIN MANAGMENT: Abdominal discomfort oxy x 2 and tylenol x2  DIET: Low Fat  BOWEL/BLADDER: continent up to BR, BM 3-27  ABNL LAB/BG: Hgb 9.4, pot WDL 3.7 recheck AM  DRAIN/DEVICES: PIV SL  TELEMETRY RHYTHM: na  SKIN:  R groin site intact  TESTS/PROCEDURES: N/A  DISCHARGE: Most likely today 3/28 pending hgb and tolerating diet  OTHER IMPORTANT INFO: Orders to transfuse if hemoglobin drops below 8. Denies nausea. Denies SOB. BS active. LUQ tender. Lungs clear. CMS intact.

## 2024-03-28 NOTE — PROGRESS NOTES
"GASTROENTEROLOGY PROGRESS NOTE     SUBJECTIVE:  Feeling better, minimal pain managed with oral opioids. Tolerating low-fat diet without n/v. Hoping to go home soon. Hgb stable.     OBJECTIVE:  General Appearance:    BP (!) 143/95 (BP Location: Left arm)   Pulse 75   Temp 98.4  F (36.9  C) (Oral)   Resp 16   Ht 1.702 m (5' 7\")   Wt 83.7 kg (184 lb 8.4 oz)   SpO2 95%   BMI 28.90 kg/m    Temp (24hrs), Av.8  F (37.1  C), Min:98.4  F (36.9  C), Max:99.4  F (37.4  C)    No data found.    Intake/Output Summary (Last 24 hours) at 3/28/2024 1025  Last data filed at 3/27/2024 1300  Gross per 24 hour   Intake 240 ml   Output --   Net 240 ml        PHYSICAL EXAM     GEN: NAD, anti-icteric   GASTROINTESTINAL: non-distended, soft, active BS, mild tenderness to palpation of right mid to upper abd, no rebound/guarding  NEURO: alert and oriented, appears comfortable           Additional Comments:  ROS, FH, SH: See initial GI consult for details.     I have reviewed the patient's new clinical lab results:     Recent Labs   Lab Test 24  0800 24  1929 24  0756 24  1258 24  0536 24  1231 24  1011 24  0528   WBC 5.1  --   --   --  4.8  --   --  7.2   HGB 9.7* 9.4* 9.0*   < > 7.8*   < > 7.9* 7.9*  7.9*   MCV 86  --   --   --  88  --   --  88     --   --   --  167  --   --  168   INR  --   --   --   --   --   --  1.23*  --     < > = values in this interval not displayed.     Recent Labs   Lab Test 24  0800 24  0756 24  1557 24  0536 24  0539 24  0528   POTASSIUM 3.7 3.7 3.9 3.4   < > 4.1   CHLORIDE 103  --   --  107  --  103   CO2 23  --   --  22  --  22   BUN 6.5  --   --  4.4*  --  14.9   ANIONGAP 13  --   --  10  --  14    < > = values in this interval not displayed.     Recent Labs   Lab Test 24  0528 24  1731 24  0820 24  0510 24  2056   ALBUMIN 3.8 4.3  --  4.4   < >  --    BILITOTAL 0.9 " 0.6  --  0.4   < >  --    ALT 21 26  --  50   < >  --    AST 14 17  --  34   < >  --    PROTEIN  --   --   --   --   --  Negative   LIPASE  --  884* 240* 127*   < >  --     < > = values in this interval not displayed.       IMAGING/ENDOSCOPIC EVALUATION    CONSULTANT ASSESSMENT/PLAN    1.  Necrotizing pancreatitis complicated by hemorrhage.     This patient is a 29 yo male admitted due to presyncope/fall with dizziness.  Patient with acute pancreatitis 1 month ago secondary to alcohol.  He has been sober since.  Ongoing moderate upper abdominal pain with occasional flares.  Syncopal episodes in the ED prompted emergent CT which showed a large evolving necrotic collection with arterial bleeding.  Angiogram was performed and negative.     No hematemesis, melena, hematochezia.  Pain improving.    Hemoglobin continued to drift down to 7.8.  1 unit PRBC transfused.  Hemoglobin dwight appropriately to 9.  Stable this morning at 9.7.  Clinically improving and hoping to discharge soon.     Plan  --Monitor hemoglobin, transfuse as needed.   --If evidence of brisk bleed, IR angiogram would be the next step for consideration of empiric embolization.   --Appreciate surgical consultation.   --Low fat diet.   --No anticoagulation.   --Okay to discharge from our perspective.   --Patient has follow-up scheduled with [facility] on Monday, April 1st.     Discussed with Dr. Lee, GI staff. We will sign off at this time. Please call with questions or concerns.     Time spent: 35 minutes, greater than 50% of the visit was spent in counseling/coordination of care.     Barbara Redman, CNP  [facility] ([facility])  [phone]

## 2024-03-28 NOTE — PLAN OF CARE
Discharge    Patient discharged to home via family    Care plan note: Patient was A&O x4, VSS on RA, up independently. Pain tolerable and improves with tylenol. Tolerated low fat diet. IV removed. Discharge AVS reviewed with patient, verbalized understanding and had no further questions. Aware of follow up appts. No new meds to be filled here.     Listed belongings gathered and given to patient (including from security/pharmacy). Yes  Care Plan and Patient education resolved: Yes  Prescriptions if needed, hard copies sent with patient  NA  Medication Bin checked and emptied on discharge Yes  SW/care coordinator/charge RN aware of discharge: No, NA

## 2024-03-30 ENCOUNTER — PATIENT OUTREACH (OUTPATIENT)
Dept: CARE COORDINATION | Facility: CLINIC | Age: 29
End: 2024-03-30
Payer: COMMERCIAL

## 2024-03-30 NOTE — PROGRESS NOTES
Connected Care Resource Center:   [facility] Resource Center Contact  [facility]/Voicemail     Clinical Data: Post-Discharge Outreach     Outreach attempted x 2.  Left message on patient's voicemail, providing [facility]'s central phone number of [phone] ([phone]) for questions/concerns and/or to schedule an appt with an [facility] provider, if they do not have a PCP.      Plan:  [facility] will do no further outreaches at this time.       Arabella Gomes MA  Connected Care Resource Center, [facility]    *Connected Care Resource Team does NOT follow patient ongoing. Referrals are identified based on internal discharge reports and the outreach is to ensure patient has an understanding of their discharge instructions.

## 2024-04-01 ENCOUNTER — PRE VISIT (OUTPATIENT)
Dept: GASTROENTEROLOGY | Facility: CLINIC | Age: 29
End: 2024-04-01
Payer: COMMERCIAL

## 2024-04-01 ENCOUNTER — PREP FOR PROCEDURE (OUTPATIENT)
Dept: GASTROENTEROLOGY | Facility: CLINIC | Age: 29
End: 2024-04-01

## 2024-04-01 ENCOUNTER — LAB (OUTPATIENT)
Dept: LAB | Facility: CLINIC | Age: 29
End: 2024-04-01
Payer: COMMERCIAL

## 2024-04-01 ENCOUNTER — ANCILLARY PROCEDURE (OUTPATIENT)
Dept: CT IMAGING | Facility: CLINIC | Age: 29
End: 2024-04-01
Attending: INTERNAL MEDICINE
Payer: COMMERCIAL

## 2024-04-01 ENCOUNTER — DOCUMENTATION ONLY (OUTPATIENT)
Dept: GASTROENTEROLOGY | Facility: CLINIC | Age: 29
End: 2024-04-01

## 2024-04-01 ENCOUNTER — OFFICE VISIT (OUTPATIENT)
Dept: GASTROENTEROLOGY | Facility: CLINIC | Age: 29
End: 2024-04-01
Attending: FAMILY MEDICINE
Payer: COMMERCIAL

## 2024-04-01 VITALS
TEMPERATURE: 98.2 F | DIASTOLIC BLOOD PRESSURE: 91 MMHG | WEIGHT: 174 LBS | HEART RATE: 79 BPM | HEIGHT: 67 IN | OXYGEN SATURATION: 100 % | BODY MASS INDEX: 27.31 KG/M2 | SYSTOLIC BLOOD PRESSURE: 157 MMHG

## 2024-04-01 DIAGNOSIS — K85.20 ALCOHOL-INDUCED ACUTE PANCREATITIS WITHOUT INFECTION OR NECROSIS: ICD-10-CM

## 2024-04-01 DIAGNOSIS — K85.20 ALCOHOL-INDUCED ACUTE PANCREATITIS WITHOUT INFECTION OR NECROSIS: Primary | ICD-10-CM

## 2024-04-01 DIAGNOSIS — K86.89 PANCREAS HEMORRHAGE: ICD-10-CM

## 2024-04-01 DIAGNOSIS — R93.5 ABNORMAL CT OF THE ABDOMEN: ICD-10-CM

## 2024-04-01 DIAGNOSIS — K86.89 ACUTE PANCREATIC FLUID COLLECTION: ICD-10-CM

## 2024-04-01 DIAGNOSIS — N20.0 NEPHROLITHIASIS: ICD-10-CM

## 2024-04-01 DIAGNOSIS — N13.30 HYDRONEPHROSIS OF RIGHT KIDNEY: ICD-10-CM

## 2024-04-01 DIAGNOSIS — N20.0 CALCULUS OF KIDNEY: Primary | ICD-10-CM

## 2024-04-01 DIAGNOSIS — R93.5 ABNORMAL CT OF THE ABDOMEN: Primary | ICD-10-CM

## 2024-04-01 LAB
ALBUMIN SERPL BCG-MCNC: 4.4 G/DL (ref 3.5–5.2)
ALP SERPL-CCNC: 79 U/L (ref 40–150)
ALT SERPL W P-5'-P-CCNC: 100 U/L (ref 0–70)
ANION GAP SERPL CALCULATED.3IONS-SCNC: 15 MMOL/L (ref 7–15)
AST SERPL W P-5'-P-CCNC: 66 U/L (ref 0–45)
BILIRUB SERPL-MCNC: 0.6 MG/DL
BUN SERPL-MCNC: 12.6 MG/DL (ref 6–20)
CALCIUM SERPL-MCNC: 9.9 MG/DL (ref 8.6–10)
CHLORIDE SERPL-SCNC: 99 MMOL/L (ref 98–107)
CREAT SERPL-MCNC: 1.12 MG/DL (ref 0.67–1.17)
CRP SERPL-MCNC: 152 MG/L
DEPRECATED HCO3 PLAS-SCNC: 23 MMOL/L (ref 22–29)
EGFRCR SERPLBLD CKD-EPI 2021: >90 ML/MIN/1.73M2
ERYTHROCYTE [DISTWIDTH] IN BLOOD BY AUTOMATED COUNT: 12.6 % (ref 10–15)
GLUCOSE SERPL-MCNC: 135 MG/DL (ref 70–99)
HCT VFR BLD AUTO: 35.4 % (ref 40–53)
HGB BLD-MCNC: 11.2 G/DL (ref 13.3–17.7)
INR PPP: 1.32 (ref 0.85–1.15)
MCH RBC QN AUTO: 27.2 PG (ref 26.5–33)
MCHC RBC AUTO-ENTMCNC: 31.6 G/DL (ref 31.5–36.5)
MCV RBC AUTO: 86 FL (ref 78–100)
PLATELET # BLD AUTO: 386 10E3/UL (ref 150–450)
POTASSIUM SERPL-SCNC: 4.2 MMOL/L (ref 3.4–5.3)
PROT SERPL-MCNC: 7.9 G/DL (ref 6.4–8.3)
RADIOLOGIST FLAGS: ABNORMAL
RBC # BLD AUTO: 4.12 10E6/UL (ref 4.4–5.9)
SODIUM SERPL-SCNC: 137 MMOL/L (ref 135–145)
WBC # BLD AUTO: 9.9 10E3/UL (ref 4–11)

## 2024-04-01 PROCEDURE — 85027 COMPLETE CBC AUTOMATED: CPT | Performed by: PATHOLOGY

## 2024-04-01 PROCEDURE — 99213 OFFICE O/P EST LOW 20 MIN: CPT | Performed by: INTERNAL MEDICINE

## 2024-04-01 PROCEDURE — 85610 PROTHROMBIN TIME: CPT | Performed by: PATHOLOGY

## 2024-04-01 PROCEDURE — 99205 OFFICE O/P NEW HI 60 MIN: CPT | Performed by: INTERNAL MEDICINE

## 2024-04-01 PROCEDURE — 86140 C-REACTIVE PROTEIN: CPT | Performed by: PATHOLOGY

## 2024-04-01 PROCEDURE — 74174 CTA ABD&PLVS W/CONTRAST: CPT | Performed by: RADIOLOGY

## 2024-04-01 PROCEDURE — G0480 DRUG TEST DEF 1-7 CLASSES: HCPCS | Mod: 90 | Performed by: PATHOLOGY

## 2024-04-01 PROCEDURE — 80053 COMPREHEN METABOLIC PANEL: CPT | Performed by: PATHOLOGY

## 2024-04-01 PROCEDURE — 99000 SPECIMEN HANDLING OFFICE-LAB: CPT | Performed by: PATHOLOGY

## 2024-04-01 PROCEDURE — 36415 COLL VENOUS BLD VENIPUNCTURE: CPT | Performed by: PATHOLOGY

## 2024-04-01 RX ORDER — IOPAMIDOL 755 MG/ML
90 INJECTION, SOLUTION INTRAVASCULAR ONCE
Status: COMPLETED | OUTPATIENT
Start: 2024-04-01 | End: 2024-04-01

## 2024-04-01 RX ORDER — OXYCODONE HYDROCHLORIDE 5 MG/1
5 TABLET ORAL EVERY 6 HOURS PRN
Qty: 12 TABLET | Refills: 0 | Status: SHIPPED | OUTPATIENT
Start: 2024-04-01 | End: 2024-04-04

## 2024-04-01 RX ADMIN — IOPAMIDOL 90 ML: 755 INJECTION, SOLUTION INTRAVASCULAR at 12:41

## 2024-04-01 ASSESSMENT — PAIN SCALES - GENERAL: PAINLEVEL: SEVERE PAIN (6)

## 2024-04-01 NOTE — LETTER
4/1/2024         RE: Duane Ramos  [id number] [place] [address]        Dear Colleague,    Thank you for referring your patient, Duane Ramos, to the [facility]. Please see a copy of my visit note below.    GI CLINIC VISIT - NEW PATIENT    CC/REFERRING MD:  Yarely Tee  REASON FOR CONSULTATION: Necrotizing pancreatitis.  ASSESSMENT/PLAN:  Necrotizing acute pancreatitis. Attributed to alcohol, although as not had US of GB (will be assessed at time of EUS).  Recent hemorrhage with negative source on angiogram. DDx includes pseudoaneurysm missed by angio/standard CT vs venous bleeding.    Will need cystgastrostomy/necrosectomy, however this should ideally be delayed until cyst wall matures. First available outpt slot is next Thursday - being held for pt. Transmural drainage in the setting of recent bleeding and no IR intervention will risk bleeding. Discussed with pt    Unclear etiology of today's flare of new pain. ? Re-bleed, ? Cholecystitis/biliary obstruction or cavity leakage.    PLAN:  - CTA abdomen today.  - Labs - ordered.  - Given supply of oxy to use sparingly. Further narcotics through primary care prn.  - Continue abstinence.    RTC at time of scheduled necrosectomy, pending CT findings above.    Thank you for this consultation.  It was a pleasure to participate in the care of this patient; please contact us with any further questions.  A total of  > 75 minutes so far was spent on the day of the visit, >50% of which was counseling regarding the above delineated issues. The remainder was review of records and imaging as well as documentation and coordination of care.      ADARSH Gallardo MD  Professor of Medicine  Division of Gastroenterology, Hepatology and Nutrition  [facility]    Addendum:  CTA shows no new hemorrhage. Cavity appears more mature.  New obstructing right ureteral stone which radiology believes may require cystoscopy.  "  Attempting to contact urology.  Has had several episodes of emesis since clinic arrival.  -------------------------------------------------------------------------------------------------------------------  HPI:  The pt is a/n 28 year old male who I was asked to see in consultation at the request of Dr. Yarely Tee for evaluation of necrotizing pancreatitis.     He was initially admitted 2/11/24 - 2/14 with acute onset abdominal pain and nausea. He had sudden onset of severe pain and has never had this before. Was driving to a Super Bowl party. Labs showed lipase > 3000, BAL 0.1. BUN 48.6 (>45) WBC 17.8. BUN 17.5, Cr 1.06. , AST 85, normal TB, Alk P.  CT showed:   IMPRESSION:   1.  Imaging features consistent with acute interstitial pancreatitis. No evidence for acute pancreatic fluid collections. No necrosis.  2.  Hepatic steatosis.  3.  Small, nonobstructing right renal calculi.    He was diagnosed with alcohol induced pancreatitis. There was no RUQ US.    He did not require ICU care or develop organ failure.    On discharge he stated he still had \"light pain\" and swollen abdomen.    He was following a low-fat diet and avoiding EtOH per the pt.    CT 3/11 showed a large evolving acute necrotic collection with apparent disconnected duct. He was referred for consultation and scheduled today for a visit.    Sat 3/23 he developed sudden onset of marked worsening of the pain with near syncope. He was seen in the ED at [facility] where he had 2 syncopal epidoses. CT showed new hemorrhage into an area of evolving necrotic collection. There was a blush in the cavity but no apparent pseudoaneurysm. Angiogram showed no bleeding or pseudoaneurysm. He was managed conservatively. Per report he received 3 unit(s) RBC and did not have melena/BRBPR or hematemesis. He was discharged 3/28 with ongoing mild pain reportedly controlled with tylenol and taking a low-fat diet.    We had attempted to refer him to a " "dietitian and follow calorie counts when intially referred but he was not seen (cancelled the clinic).    Total he reported new onset of a sudden severe right flank/RUQ pain which is different. He has vomited several times (non-bloody). He denies F/C/NS, diarrhea, melena, hematochezia or jaundice. The pain is 5-6/10. He is taking tylenol.    ROS:  See history of present illness.    PERTINENT PAST MEDICAL HISTORY:  No past medical history on file.    PREVIOUS SURGERIES:  Past Surgical History:   Procedure Laterality Date    IR VISCERAL ANGIOGRAM  3/24/2024    OTHER SURGICAL HISTORY Right 2008    broken clavicle    [place] LAP,APPENDECTOMY N/A 11/18/2017    Procedure: APPENDECTOMY, LAPAROSCOPIC;  Surgeon: Milton Francois MD;  Location: [facility];  Service: General       PREVIOUS ENDOSCOPY:  None    ALLERGIES:   No Known Allergies    PERTINENT MEDICATIONS:    Current Outpatient M    acetaminophen (TYLENOL) 325 MG tablet, Take 2 tablets (650 mg) by mouth every 8 hours as needed for mild pain or other (and adjunct with moderate or severe pain or per patient request), Disp: , Rfl:   No current facility-administered medications for this visit.    SOCIAL HISTORY:  Reports 2 drinks per day, then 1L vodka on weekends. \"Heavier a few years ago\". Denies intake since initial admission.    Social History     Socioeconomic History    Marital status: Single     Spouse name: Not on file    Number of children: Not on file    Years of education: Not on file    Highest education level: Not on file   Occupational History    Not on file   Tobacco Use    Smoking status: Former     Types: Cigarettes    Smokeless tobacco: Never   Vaping Use    Vaping Use: Never used   Substance and Sexual Activity    Alcohol use: Yes    Drug use: No    Sexual activity: Not on file   Other Topics Concern    Not on file   Social History Narrative    Works as  at [employer].      Social Determinants of Health     Financial Resource Strain: Not on " file   Food Insecurity: Not on file   Transportation Needs: Not on file   Physical Activity: Not on file   Stress: Not on file   Social Connections: Not on file   Interpersonal Safety: Low Risk  (2/29/2024)    Interpersonal Safety     Do you feel physically and emotionally safe where you currently live?: Yes     Within the past 12 months, have you been hit, slapped, kicked or otherwise physically hurt by someone?: No     Within the past 12 months, have you been humiliated or emotionally abused in other ways by your partner or ex-partner?: No   Housing Stability: Not on file       FAMILY HISTORY:  No family history on file.      PHYSICAL EXAMINATION:  Vitals reviewed, AFVSS   Wt   Wt Readings from Last 2 Encounters:   04/01/24 78.9 kg (174 lb)   03/23/24 83.7 kg (184 lb 8.4 oz)      Gen: aaox3, cooperative, pleasant, not dyspneic/diaphoretic, unable to sit still and frequently changing positions to attempt to relieve pain. Does not appear agitated, sedated or tremulous. No odor of EtOH.  HEENT: ncat, neck supple, no clad, normal op w/o ulcer/exudate, anicteric, mmm  Resp/CV unremarkable  Abd:  Eccentric distension in LUQ with visible fullness. +bs, tender diffusely, most epigastrium/RUQ, unable to assess for HSM. peritoneal signs  Ext: no c/c/e  Skin: warm, perfused, no jaundice  Back: Mild Rt CVA Tenderness.      PERTINENT STUDIES:    Office Visit on 03/20/2024   Component Date Value Ref Range Status    Lipase 03/20/2024 240 (H)  13 - 60 U/L Final        3/28:      Hg 9.7  WBC 5.1  A1C 5.3    3/26 PEth 29 (moderate consumption).        Again, thank you for allowing me to participate in the care of your patient.      Sincerely,    Marc Gallardo MD

## 2024-04-01 NOTE — NURSING NOTE
"Oncology Rooming Note    April 1, 2024 11:22 AM   Duane Ramos is a 28 year old male who presents for:    Chief Complaint   Patient presents with    Oncology Clinic Visit     New Eval for Acute Pancreatitis      Initial Vitals: BP (!) 157/91   Pulse 79   Temp 98.2  F (36.8  C)   Ht 1.702 m (5' 7\")   Wt 78.9 kg (174 lb)   SpO2 100%   BMI 27.25 kg/m   Estimated body mass index is 27.25 kg/m  as calculated from the following:    Height as of this encounter: 1.702 m (5' 7\").    Weight as of this encounter: 78.9 kg (174 lb). Body surface area is 1.93 meters squared.  Severe Pain (6) Comment: Data Unavailable   No LMP for male patient.  Allergies reviewed: Yes  Medications reviewed: Yes    Medications: Medication refills not needed today.  Pharmacy name entered into [facility]: [id number] - [place], [place] - [address] AT [facility] [address]    Frailty Screening:   Is the patient here for a new oncology consult visit in cancer care? 2. No      Clinical concerns: Pt is having some New Pain in  his Right abndomen area        Kylee RONDA Allan             "

## 2024-04-01 NOTE — DISCHARGE INSTRUCTIONS

## 2024-04-01 NOTE — PROGRESS NOTES
GI CLINIC VISIT - NEW PATIENT    CC/REFERRING MD:  Yarely Tee  REASON FOR CONSULTATION: Necrotizing pancreatitis.  ASSESSMENT/PLAN:  Necrotizing acute pancreatitis. Attributed to alcohol, although as not had US of GB (will be assessed at time of EUS).  Recent hemorrhage with negative source on angiogram. DDx includes pseudoaneurysm missed by angio/standard CT vs venous bleeding.    Will need cystgastrostomy/necrosectomy, however this should ideally be delayed until cyst wall matures. First available outpt slot is next Thursday - being held for pt. Transmural drainage in the setting of recent bleeding and no IR intervention will risk bleeding. Discussed with pt    Unclear etiology of today's flare of new pain. ? Re-bleed, ? Cholecystitis/biliary obstruction or cavity leakage.    PLAN:  - CTA abdomen today.  - Labs - ordered.  - Given supply of oxy to use sparingly. Further narcotics through primary care prn.  - Continue abstinence.    RTC at time of scheduled necrosectomy, pending CT findings above.    Thank you for this consultation.  It was a pleasure to participate in the care of this patient; please contact us with any further questions.  A total of  > 75 minutes so far was spent on the day of the visit, >50% of which was counseling regarding the above delineated issues. The remainder was review of records and imaging as well as documentation and coordination of care.      ADARSH Gallardo MD  Professor of Medicine  Division of Gastroenterology, Hepatology and Nutrition  [facility]    Addendum:  CTA shows no new hemorrhage. Cavity appears more mature.  New obstructing right ureteral stone which radiology believes may require cystoscopy.   Attempting to contact urology.  Has had several episodes of emesis since clinic arrival.  -------------------------------------------------------------------------------------------------------------------  HPI:  The pt is a/n 28 year old male who I was  "asked to see in consultation at the request of Dr. Yarely Tee for evaluation of necrotizing pancreatitis.     He was initially admitted 2/11/24 - 2/14 with acute onset abdominal pain and nausea. He had sudden onset of severe pain and has never had this before. Was driving to a [employer] [place] party. Labs showed lipase > 3000, BAL 0.1. BUN 48.6 (>45) WBC 17.8. BUN 17.5, Cr 1.06. , AST 85, normal TB, Alk P.  CT showed:   IMPRESSION:   1.  Imaging features consistent with acute interstitial pancreatitis. No evidence for acute pancreatic fluid collections. No necrosis.  2.  Hepatic steatosis.  3.  Small, nonobstructing right renal calculi.    He was diagnosed with alcohol induced pancreatitis. There was no RUQ US.    He did not require ICU care or develop organ failure.    On discharge he stated he still had \"light pain\" and swollen abdomen.    He was following a low-fat diet and avoiding EtOH per the pt.    CT 3/11 showed a large evolving acute necrotic collection with apparent disconnected duct. He was referred for consultation and scheduled today for a visit.    Sat 3/23 he developed sudden onset of marked worsening of the pain with near syncope. He was seen in the ED at [facility] where he had 2 syncopal epidoses. CT showed new hemorrhage into an area of evolving necrotic collection. There was a blush in the cavity but no apparent pseudoaneurysm. Angiogram showed no bleeding or pseudoaneurysm. He was managed conservatively. Per report he received 3 unit(s) RBC and did not have melena/BRBPR or hematemesis. He was discharged 3/28 with ongoing mild pain reportedly controlled with tylenol and taking a low-fat diet.    We had attempted to refer him to a dietitian and follow calorie counts when intially referred but he was not seen (cancelled the clinic).    Total he reported new onset of a sudden severe right flank/RUQ pain which is different. He has vomited several times (non-bloody). He denies F/C/NS, " "diarrhea, melena, hematochezia or jaundice. The pain is 5-6/10. He is taking tylenol.    ROS:  See history of present illness.    PERTINENT PAST MEDICAL HISTORY:  No past medical history on file.    PREVIOUS SURGERIES:  Past Surgical History:   Procedure Laterality Date    IR VISCERAL ANGIOGRAM  3/24/2024    OTHER SURGICAL HISTORY Right 2008    broken clavicle    [place] LAP,APPENDECTOMY N/A 11/18/2017    Procedure: APPENDECTOMY, LAPAROSCOPIC;  Surgeon: Milton Francois MD;  Location: [facility];  Service: General       PREVIOUS ENDOSCOPY:  None    ALLERGIES:   No Known Allergies    PERTINENT MEDICATIONS:    Current Outpatient M    acetaminophen (TYLENOL) 325 MG tablet, Take 2 tablets (650 mg) by mouth every 8 hours as needed for mild pain or other (and adjunct with moderate or severe pain or per patient request), Disp: , Rfl:   No current facility-administered medications for this visit.    SOCIAL HISTORY:  Reports 2 drinks per day, then 1L vodka on weekends. \"Heavier a few years ago\". Denies intake since initial admission.    Social History     Socioeconomic History    Marital status: Single     Spouse name: Not on file    Number of children: Not on file    Years of education: Not on file    Highest education level: Not on file   Occupational History    Not on file   Tobacco Use    Smoking status: Former     Types: Cigarettes    Smokeless tobacco: Never   Vaping Use    Vaping Use: Never used   Substance and Sexual Activity    Alcohol use: Yes    Drug use: No    Sexual activity: Not on file   Other Topics Concern    Not on file   Social History Narrative    Works as  at [employer].      Social Determinants of Health     Financial Resource Strain: Not on file   Food Insecurity: Not on file   Transportation Needs: Not on file   Physical Activity: Not on file   Stress: Not on file   Social Connections: Not on file   Interpersonal Safety: Low Risk  (2/29/2024)    Interpersonal Safety     Do you feel physically " and emotionally safe where you currently live?: Yes     Within the past 12 months, have you been hit, slapped, kicked or otherwise physically hurt by someone?: No     Within the past 12 months, have you been humiliated or emotionally abused in other ways by your partner or ex-partner?: No   Housing Stability: Not on file       FAMILY HISTORY:  No family history on file.      PHYSICAL EXAMINATION:  Vitals reviewed, AFVSS   Wt   Wt Readings from Last 2 Encounters:   04/01/24 78.9 kg (174 lb)   03/23/24 83.7 kg (184 lb 8.4 oz)      Gen: aaox3, cooperative, pleasant, not dyspneic/diaphoretic, unable to sit still and frequently changing positions to attempt to relieve pain. Does not appear agitated, sedated or tremulous. No odor of EtOH.  HEENT: ncat, neck supple, no clad, normal op w/o ulcer/exudate, anicteric, mmm  Resp/CV unremarkable  Abd:  Eccentric distension in LUQ with visible fullness. +bs, tender diffusely, most epigastrium/RUQ, unable to assess for HSM. peritoneal signs  Ext: no c/c/e  Skin: warm, perfused, no jaundice  Back: Mild Rt CVA Tenderness.      PERTINENT STUDIES:    Office Visit on 03/20/2024   Component Date Value Ref Range Status    Lipase 03/20/2024 240 (H)  13 - 60 U/L Final        3/28:      Hg 9.7  WBC 5.1  A1C 5.3    3/26 PEth 29 (moderate consumption).

## 2024-04-01 NOTE — PATIENT INSTRUCTIONS
Please call with any questions or concerns regarding your clinic visit today.     It is a pleasure being involved in your health care.     Contacts post-consultation depending on your need:     Schedule Clinic Appointments                        [phone], option 1    Sarah Beth Smith RN Care Coordinator           [phone]     [place], [place]                           [phone]     GI Procedure Scheduling                               [phone], option 2     For urgent/emergent questions after business hours, you may reach the on-call GI Fellow by contacting the [facility]  at [phone].    How to I schedule a follow-up visit?  If you did not schedule a follow-up visit today, please call [phone] option #1 to schedule a follow-up office visit.       How do I schedule labs, imaging studies, or procedures that were ordered in clinic today?      Labs: To schedule lab appointment at the [facility], use my chart or call [phone]. If you have a [place] lab closer to home where you are regularly seen you can give them a call.      Procedures: If a colonoscopy, upper endoscopy, breath test, esophageal manometry, or pH impedence was ordered today, our endoscopy team will call you to schedule this. If you have not heard from our endoscopy team within a week, please call [phone] to schedule.      Imaging Studies: If you were scheduled for a CT scan, X-ray, MRI, ultrasound, HIDA scan or other imaging study, please call [phone] to have this scheduled.      Referral: If a referral to another specialty was ordered, expect a phone call or follow instructions above. If you have not heard from anyone regarding your referral in a week, please call our clinic to check the status.     I recommend signing up for [employer] Software access if you have not already done so and are comfortable with using a computer.  This allows for online access to your lab results and  also helps you communicate efficiently with the clinic should any questions arise in your care.

## 2024-04-01 NOTE — PROGRESS NOTES
See clinic note.    I was able to contact the on-call resident for urology.    Felt that a 5 mm stone had an approximately 50% chance of passes with conservative mgmt and hydration. If not infected and pt able to keep fluids/pills down this could be managed as outpt.    Pt had vomited 3 times since arrival, however states that he was now feeling better and able to keep liquids down. Pain had significantly improved (now primarily posterior in CVA).    Offered option of ED referral, however this would likely lead to very delayed evaluation and treatment given recent trends in our ED.    Pt felt comfortable going home, with plans for po hydration and straining of urine. Strainer and stone management instructions provided. Have already given script for small supply of oxycodone.  Arranged for checking UA through local facility.    Instructed to come to out ED if fevers, unmanageable pain, unable to keep liquids down. Number provided to call if questions.    Urology consult placed.    Keep scheduled appt next Thursday for EUS necrosectomy. This is earliest outpt availability.    ADARSH Gallardo MD  Professor of Medicine  Division of Gastroenterology, Hepatology and Nutrition  [facility]

## 2024-04-03 ENCOUNTER — PREP FOR PROCEDURE (OUTPATIENT)
Dept: GASTROENTEROLOGY | Facility: CLINIC | Age: 29
End: 2024-04-03
Payer: COMMERCIAL

## 2024-04-03 ENCOUNTER — PATIENT OUTREACH (OUTPATIENT)
Dept: GASTROENTEROLOGY | Facility: CLINIC | Age: 29
End: 2024-04-03
Payer: COMMERCIAL

## 2024-04-03 DIAGNOSIS — K86.89 PANCREAS HEMORRHAGE: ICD-10-CM

## 2024-04-03 DIAGNOSIS — K85.20 ALCOHOL-INDUCED ACUTE PANCREATITIS WITHOUT INFECTION OR NECROSIS: ICD-10-CM

## 2024-04-03 DIAGNOSIS — K85.20 ALCOHOL-INDUCED ACUTE PANCREATITIS: Primary | ICD-10-CM

## 2024-04-03 LAB
LABORATORY REPORT: NORMAL
PETH INTERPRETATION: NORMAL
PLPETH BLD-MCNC: 11 NG/ML
POPETH BLD-MCNC: 18 NG/ML

## 2024-04-03 NOTE — TELEPHONE ENCOUNTER
Called pt as follow up to clinic on Monday. Urology appointment made for . Pt is still experiencing pain from the kidney stone and has not yet passed it. Said yesterday he had minimal pain, but today it is painful again both his pancreas pain and the kidney stone pain. Pt states that the urology clinic suggested an intervention to remove the stone if he has not yet passed it by the time of their clinic. Will [facility] urology RN pool to see if earlier clinic would be an option for him    Pt managing pain with ES tylenol X 2 daily and prn oxycodone. Did advise that he try taking additional dose of tylenol if pain is worsening and to contact his PCP to make a follow up appt to his hospitalization. That narcotic refills will need to be prescribed by his PCP.     Relayed Dr Gallardo's request for the UA specimen, pt said he would submit this tomorrow. Procedure reviewed for  and advised pt review prior [facility] messages sent in regards to the kidney stone and the upcoming procedure.     Procedure/Imaging/Clinic:  EUS necrosectomy    Physician: Sami   Timin/11   Scope time needed:45 min   Anesthesia:General   Dx: Alcohol-induced acute pancreatitis without infection or necrosis, pancreas hemorrhage   Tier:Tier 2 - Not life/limb threatening but potential for  patient morbidity/mortality or adverse  patient/disease outcome if  surgery/procedure not done within 30 day   Location: [facility]   Header of letter for pt communication:     Procedure planned for . Per Dr Gallardo's discussion will likely need serial procedures, will tentatively hold a procedure date the next  as well.      Explained they can expect a call from  for date of procedure, OR will call 1-2 days prior to procedure date with arrival time, will need a , someone to stay with them for 24 hours and should stay in town for 24 hours (within 45 min of Hospital) post procedure    Patient needs to get pre-op physical completed. If  outside [facility] system will need physical faxed to number [phone]     If you do not get a preop physical, your procedure could be cancelled, patient voiced understanding*    Preop Plan: office visit with Dr Gallardo completed on 4/1/24    Does patient have [employer] insurance?: [place]    Med Review    Blood thinner -  none  ASA - none  Diabetic - none  Any meds by injection or mouth for weight loss or diabetes- none    Patient Education r[facility]t [facility]: [facility]    A pre-op nurse will call 1-2 days prior to the procedure.    NPO/Prep:   Adults and Children of all ages may consume solids up to 8 hours prior to arrival time - may consume clear liquids up to 1 hour prior to arrival time.    Verbalized understanding of all instructions. All questions answered.     Procedure order placed, message routed to OR        Indu Smith, RN, BSN,   [facility]  Care coordinator

## 2024-04-04 ENCOUNTER — TELEPHONE (OUTPATIENT)
Dept: UROLOGY | Facility: CLINIC | Age: 29
End: 2024-04-04
Payer: COMMERCIAL

## 2024-04-04 NOTE — TELEPHONE ENCOUNTER
From: Indu Smith RN   Sent: 4/3/2024   1:44 PM CDT   To: [facility]   Subject: Urology referral for kidney stone, earlier a*     Hi     I am working with this patient for his GI care and have been in contact with him today. He is still experiencing significant pain due to the kidney stone. I see that he is scheduled in urology clinic on 4/23. Is there any way he can be seen sooner? Or added to a cancellation waitlist?   I have ordered a UA per my providers request in GI, just FYI if you see that has resulted he is going to submit it tomorrow.     Thanks   Indu Smith RN, BSN,   [facility]   Care coordinator       CT 4/1/24:  5 mm obstructing calculus in the right proximal ureter is new. This  appears to have migrated from an intrarenal location from the CT on  3/23/2024.    Okay to use KULDEEP spot in [facility] next week for patient or sooner intervention recommended?     Thank you,   Matthew GARCIA RN  [facility]

## 2024-04-05 ENCOUNTER — LAB (OUTPATIENT)
Dept: LAB | Facility: CLINIC | Age: 29
End: 2024-04-05
Payer: COMMERCIAL

## 2024-04-05 DIAGNOSIS — N20.0 CALCULUS OF KIDNEY: ICD-10-CM

## 2024-04-05 LAB
ALBUMIN UR-MCNC: >=300 MG/DL
APPEARANCE UR: CLEAR
BILIRUB UR QL STRIP: ABNORMAL
COLOR UR AUTO: YELLOW
GLUCOSE UR STRIP-MCNC: NEGATIVE MG/DL
GRAN CASTS #/AREA URNS LPF: ABNORMAL /LPF
HGB UR QL STRIP: ABNORMAL
HYALINE CASTS #/AREA URNS LPF: ABNORMAL /LPF
KETONES UR STRIP-MCNC: 80 MG/DL
LEUKOCYTE ESTERASE UR QL STRIP: NEGATIVE
MUCOUS THREADS #/AREA URNS LPF: PRESENT /LPF
NITRATE UR QL: NEGATIVE
PH UR STRIP: 6 [PH] (ref 5–7)
RBC #/AREA URNS AUTO: ABNORMAL /HPF
SP GR UR STRIP: >=1.03 (ref 1–1.03)
SQUAMOUS #/AREA URNS AUTO: ABNORMAL /LPF
UROBILINOGEN UR STRIP-ACNC: 0.2 E.U./DL
WBC #/AREA URNS AUTO: ABNORMAL /HPF

## 2024-04-05 PROCEDURE — 81001 URINALYSIS AUTO W/SCOPE: CPT

## 2024-04-05 NOTE — TELEPHONE ENCOUNTER
Scheduled and sent my chart to the patient, asked him to message back if the appointment will not work for him.      Jocelyn Huitron LPN on 4/5/2024 at 12:55 PM

## 2024-04-05 NOTE — TELEPHONE ENCOUNTER
Spoke with patient about importance of UA sample per Dr Gallardo in case an antibiotic is needed. 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 Said the kidney stone pain is not bad today. Does have swelling on the left side of his abdomen and wonders if this upcoming EUS will help with that. We discussed plans for another procedure on 4/18 and that he may feel improvement after two debridement's. He is hoping to return to work after the procedure, he does not have short term disability to fall back on for income.   I also advised him to watch for communication from Urology to get him in for a sooner appt.

## 2024-04-09 ENCOUNTER — VIRTUAL VISIT (OUTPATIENT)
Dept: UROLOGY | Facility: CLINIC | Age: 29
End: 2024-04-09
Payer: COMMERCIAL

## 2024-04-09 DIAGNOSIS — N20.0 NEPHROLITHIASIS: Primary | ICD-10-CM

## 2024-04-09 PROCEDURE — 99204 OFFICE O/P NEW MOD 45 MIN: CPT | Mod: 95 | Performed by: STUDENT IN AN ORGANIZED HEALTH CARE EDUCATION/TRAINING PROGRAM

## 2024-04-09 RX ORDER — TAMSULOSIN HYDROCHLORIDE 0.4 MG/1
0.4 CAPSULE ORAL DAILY
Qty: 20 CAPSULE | Refills: 0 | Status: SHIPPED | OUTPATIENT
Start: 2024-04-09 | End: 2024-04-23

## 2024-04-09 NOTE — PROGRESS NOTES
UROLOGY OUTPATIENT VISIT      Chief Complaint:   Right ureter stone      Synopsis   Duane Ramos is a very pleasant AGE: 28 year old year old person with history of necrotizing pancreatitis     He was seen in GI clinic 4/1 and had new pain, CT obtained that showed 5 mm ureter stone    He is a first time unidentified composition stone former who has not required stone clearance procedures.     Stone History  -Prior stone: no previous stones   -Number of previous stone episodes: 0  -Laterality: not previously experienced stones on either side  -Prior surgery: no procedure for stones    Risk Factors  -History of GI/Bariatric surgery: no history of GI/bariatric surgery  -Urinary reconstruction: no previous history of urinary reconstruction  -Anatomic anomalies: no urinary tract anomalies  -Family history of stones: first degree relative    He has not previously participated in stone risk evaluation. .     Last Monday when he went in for pancreas scan he was having severe pain in the right back side. No blood in the urine. Pain 2-3 times since then. No fevers. No vomiting.    Imaging   I personally reviewed the CT scan and by my interpretation it demonstrates 5 mm right proximal ureter stone with mild right hydronephrosis no renal stones, no left renal stones or hydronephrosis            The following distinct labs were reviewed   Most Recent 3 CBC's:  Recent Labs   Lab Test 04/01/24  1310 03/28/24  0800 03/27/24  1929 03/26/24  1258 03/26/24  0536   WBC 9.9 5.1  --   --  4.8   HGB 11.2* 9.7* 9.4*   < > 7.8*   MCV 86 86  --   --  88    235  --   --  167    < > = values in this interval not displayed.     Most Recent 3 BMP's:  Recent Labs   Lab Test 04/01/24  1310 03/28/24  0800 03/27/24  0756 03/26/24  1557 03/26/24  0536    139  --   --  139   POTASSIUM 4.2 3.7 3.7   < > 3.4   CHLORIDE 99 103  --   --  107   CO2 23 23  --   --  22   BUN 12.6 6.5  --   --  4.4*   CR 1.12 0.74  --   --  0.75    ANIONGAP 15 13  --   --  10   BUZZ 9.9 9.4  --   --  8.4*   * 103*  --   --  83    < > = values in this interval not displayed.     Most Recent Urinalysis:  Recent Labs   Lab Test 04/05/24  1301   COLOR Yellow   APPEARANCE Clear   URINEGLC Negative   URINEBILI Moderate*   URINEKETONE 80*   SG >=1.030   UBLD Large*   URINEPH 6.0   PROTEIN >=300*   UROBILINOGEN 0.2   NITRITE Negative   LEUKEST Negative   RBCU 25-50*   WBCU 0-5       Medical Comorbidities    No past medical history on file.            Medications     Current Outpatient Medications   Medication Sig Dispense Refill    acetaminophen (TYLENOL) 325 MG tablet Take 2 tablets (650 mg) by mouth every 8 hours as needed for mild pain or other (and adjunct with moderate or severe pain or per patient request)       No current facility-administered medications for this visit.            Assessment/Plan   Duane Ramos is a very pleasant AGE: 28 year old year old person with history of necrotizing pancreatitis     Right ureter stone - 5 mm stone, pain controlled, UA negative for infection.     Will proceed with medical expulsive therapy. Risks and benefits were detailed of medical expulsive therapy including probability of stone passage, recurrent renal colic, and requirement of emergency medical and/or surgical care and further imaging. Patient verbalized understanding. Patient agrees with plan as discussed. He will return in 2 weeks with CT    We discussed using tamsulosin 0.4 mg daily for medical expulsive therapy and that some studies slow small improvement in stone passage.      CC:  Rogelio Mcdonough    Additional Coding Information:    Problems:  3 -- one acute uncomplicated illness or injury    Data Reviewed  Tests ordered: CT A/P  Independent interpretation of a test performed by another physician/other qualified health care professional (not separately reported) - CT A/P    Level of risk:  4 -- prescription drug management      Video-Visit  Details    Type of service:  Video Visit     Joined the call at 4/9/2024, 3:26:26 pm.  Left the call at 4/9/2024, 3:39:29 pm.  You were on the call for 13 minutes 2 seconds .  Originating Location (pt. Location): Home    Distant Location (provider location):  On-site  Platform used for Video Visit: [employer]

## 2024-04-10 ENCOUNTER — ANESTHESIA EVENT (OUTPATIENT)
Dept: SURGERY | Facility: CLINIC | Age: 29
End: 2024-04-10
Payer: COMMERCIAL

## 2024-04-10 RX ORDER — OXYCODONE HYDROCHLORIDE 5 MG/1
5 TABLET ORAL EVERY 6 HOURS PRN
Qty: 12 TABLET | Refills: 0 | Status: SHIPPED | OUTPATIENT
Start: 2024-04-10 | End: 2024-04-13

## 2024-04-11 ENCOUNTER — TELEPHONE (OUTPATIENT)
Dept: GASTROENTEROLOGY | Facility: CLINIC | Age: 29
End: 2024-04-11

## 2024-04-11 ENCOUNTER — HOSPITAL ENCOUNTER (OUTPATIENT)
Facility: CLINIC | Age: 29
Discharge: HOME | End: 2024-04-11
Attending: INTERNAL MEDICINE | Admitting: INTERNAL MEDICINE
Payer: COMMERCIAL

## 2024-04-11 ENCOUNTER — APPOINTMENT (OUTPATIENT)
Dept: GENERAL RADIOLOGY | Facility: CLINIC | Age: 29
End: 2024-04-11
Attending: INTERNAL MEDICINE
Payer: COMMERCIAL

## 2024-04-11 ENCOUNTER — ANESTHESIA (OUTPATIENT)
Dept: SURGERY | Facility: CLINIC | Age: 29
End: 2024-04-11
Payer: COMMERCIAL

## 2024-04-11 VITALS
WEIGHT: 170.19 LBS | HEART RATE: 87 BPM | OXYGEN SATURATION: 99 % | RESPIRATION RATE: 18 BRPM | TEMPERATURE: 97.5 F | SYSTOLIC BLOOD PRESSURE: 132 MMHG | DIASTOLIC BLOOD PRESSURE: 86 MMHG | HEIGHT: 67 IN | BODY MASS INDEX: 26.71 KG/M2

## 2024-04-11 DIAGNOSIS — K85.20 ALCOHOL-INDUCED ACUTE PANCREATITIS WITHOUT INFECTION OR NECROSIS: Primary | ICD-10-CM

## 2024-04-11 LAB
ABO/RH(D): NORMAL
ANTIBODY SCREEN: NEGATIVE
ERYTHROCYTE [DISTWIDTH] IN BLOOD BY AUTOMATED COUNT: 12.9 % (ref 10–15)
HCT VFR BLD AUTO: 31.2 % (ref 40–53)
HGB BLD-MCNC: 10.2 G/DL (ref 13.3–17.7)
MCH RBC QN AUTO: 27.6 PG (ref 26.5–33)
MCHC RBC AUTO-ENTMCNC: 32.7 G/DL (ref 31.5–36.5)
MCV RBC AUTO: 84 FL (ref 78–100)
PLATELET # BLD AUTO: 248 10E3/UL (ref 150–450)
RBC # BLD AUTO: 3.7 10E6/UL (ref 4.4–5.9)
SPECIMEN EXPIRATION DATE: NORMAL
WBC # BLD AUTO: 5.2 10E3/UL (ref 4–11)

## 2024-04-11 PROCEDURE — C1874 STENT, COATED/COV W/DEL SYS: HCPCS | Performed by: INTERNAL MEDICINE

## 2024-04-11 PROCEDURE — 43237 ENDOSCOPIC US EXAM ESOPH: CPT

## 2024-04-11 PROCEDURE — 250N000009 HC RX 250: Performed by: ANESTHESIOLOGY

## 2024-04-11 PROCEDURE — 370N000017 HC ANESTHESIA TECHNICAL FEE, PER MIN: Performed by: INTERNAL MEDICINE

## 2024-04-11 PROCEDURE — 710N000009 HC RECOVERY PHASE 1, LEVEL 1, PER MIN: Performed by: INTERNAL MEDICINE

## 2024-04-11 PROCEDURE — C2625 STENT, NON-COR, TEM W/DEL SY: HCPCS | Performed by: INTERNAL MEDICINE

## 2024-04-11 PROCEDURE — 250N000025 HC SEVOFLURANE, PER MIN: Performed by: INTERNAL MEDICINE

## 2024-04-11 PROCEDURE — 258N000003 HC RX IP 258 OP 636: Performed by: ANESTHESIOLOGY

## 2024-04-11 PROCEDURE — 710N000012 HC RECOVERY PHASE 2, PER MINUTE: Performed by: INTERNAL MEDICINE

## 2024-04-11 PROCEDURE — 85027 COMPLETE CBC AUTOMATED: CPT | Performed by: ANESTHESIOLOGY

## 2024-04-11 PROCEDURE — 250N000011 HC RX IP 250 OP 636: Performed by: ANESTHESIOLOGY

## 2024-04-11 PROCEDURE — 360N000083 HC SURGERY LEVEL 3 W/ FLUORO, PER MIN: Performed by: INTERNAL MEDICINE

## 2024-04-11 PROCEDURE — 255N000002 HC RX 255 OP 636: Performed by: INTERNAL MEDICINE

## 2024-04-11 PROCEDURE — C1769 GUIDE WIRE: HCPCS | Performed by: INTERNAL MEDICINE

## 2024-04-11 PROCEDURE — 999N000141 HC STATISTIC PRE-PROCEDURE NURSING ASSESSMENT: Performed by: INTERNAL MEDICINE

## 2024-04-11 PROCEDURE — 999N000179 XR SURGERY CARM FLUORO LESS THAN 5 MIN W STILLS: Mod: TC

## 2024-04-11 PROCEDURE — 43237 ENDOSCOPIC US EXAM ESOPH: CPT | Performed by: ANESTHESIOLOGY

## 2024-04-11 PROCEDURE — 272N000001 HC OR GENERAL SUPPLY STERILE: Performed by: INTERNAL MEDICINE

## 2024-04-11 PROCEDURE — 86900 BLOOD TYPING SEROLOGIC ABO: CPT | Performed by: ANESTHESIOLOGY

## 2024-04-11 PROCEDURE — 36415 COLL VENOUS BLD VENIPUNCTURE: CPT | Performed by: ANESTHESIOLOGY

## 2024-04-11 PROCEDURE — C1726 CATH, BAL DIL, NON-VASCULAR: HCPCS | Performed by: INTERNAL MEDICINE

## 2024-04-11 DEVICE — SOLUS, DOUBLE PIGTAIL STENT WITH INTRODUCER
Type: IMPLANTABLE DEVICE | Site: STOMACH | Status: NON-FUNCTIONAL
Brand: SOLUS
Removed: 2024-04-18

## 2024-04-11 DEVICE — STENT AND ELECTROCAUTERY - ENHANCED DELIVERY SYSTEM
Type: IMPLANTABLE DEVICE | Site: STOMACH | Status: NON-FUNCTIONAL
Brand: AXIOS™
Removed: 2024-04-18

## 2024-04-11 RX ORDER — DEXAMETHASONE SODIUM PHOSPHATE 4 MG/ML
INJECTION, SOLUTION INTRA-ARTICULAR; INTRALESIONAL; INTRAMUSCULAR; INTRAVENOUS; SOFT TISSUE PRN
Status: DISCONTINUED | OUTPATIENT
Start: 2024-04-11 | End: 2024-04-11

## 2024-04-11 RX ORDER — LIDOCAINE 40 MG/G
CREAM TOPICAL
Status: DISCONTINUED | OUTPATIENT
Start: 2024-04-11 | End: 2024-04-11 | Stop reason: HOSPADM

## 2024-04-11 RX ORDER — FENTANYL CITRATE 50 UG/ML
25 INJECTION, SOLUTION INTRAMUSCULAR; INTRAVENOUS EVERY 5 MIN PRN
Status: DISCONTINUED | OUTPATIENT
Start: 2024-04-11 | End: 2024-04-11 | Stop reason: HOSPADM

## 2024-04-11 RX ORDER — ONDANSETRON 2 MG/ML
4 INJECTION INTRAMUSCULAR; INTRAVENOUS EVERY 30 MIN PRN
Status: DISCONTINUED | OUTPATIENT
Start: 2024-04-11 | End: 2024-04-11 | Stop reason: HOSPADM

## 2024-04-11 RX ORDER — ONDANSETRON 4 MG/1
4 TABLET, ORALLY DISINTEGRATING ORAL EVERY 30 MIN PRN
Status: DISCONTINUED | OUTPATIENT
Start: 2024-04-11 | End: 2024-04-11 | Stop reason: HOSPADM

## 2024-04-11 RX ORDER — HYDROMORPHONE HCL IN WATER/PF 6 MG/30 ML
0.2 PATIENT CONTROLLED ANALGESIA SYRINGE INTRAVENOUS EVERY 5 MIN PRN
Status: DISCONTINUED | OUTPATIENT
Start: 2024-04-11 | End: 2024-04-11 | Stop reason: HOSPADM

## 2024-04-11 RX ORDER — LEVOFLOXACIN 5 MG/ML
INJECTION, SOLUTION INTRAVENOUS PRN
Status: DISCONTINUED | OUTPATIENT
Start: 2024-04-11 | End: 2024-04-11

## 2024-04-11 RX ORDER — SODIUM CHLORIDE, SODIUM LACTATE, POTASSIUM CHLORIDE, CALCIUM CHLORIDE 600; 310; 30; 20 MG/100ML; MG/100ML; MG/100ML; MG/100ML
INJECTION, SOLUTION INTRAVENOUS CONTINUOUS PRN
Status: DISCONTINUED | OUTPATIENT
Start: 2024-04-11 | End: 2024-04-11

## 2024-04-11 RX ORDER — HYDROMORPHONE HCL IN WATER/PF 6 MG/30 ML
0.4 PATIENT CONTROLLED ANALGESIA SYRINGE INTRAVENOUS EVERY 5 MIN PRN
Status: DISCONTINUED | OUTPATIENT
Start: 2024-04-11 | End: 2024-04-11 | Stop reason: HOSPADM

## 2024-04-11 RX ORDER — FENTANYL CITRATE 50 UG/ML
50 INJECTION, SOLUTION INTRAMUSCULAR; INTRAVENOUS EVERY 5 MIN PRN
Status: DISCONTINUED | OUTPATIENT
Start: 2024-04-11 | End: 2024-04-11 | Stop reason: HOSPADM

## 2024-04-11 RX ORDER — SODIUM CHLORIDE, SODIUM LACTATE, POTASSIUM CHLORIDE, CALCIUM CHLORIDE 600; 310; 30; 20 MG/100ML; MG/100ML; MG/100ML; MG/100ML
INJECTION, SOLUTION INTRAVENOUS CONTINUOUS
Status: DISCONTINUED | OUTPATIENT
Start: 2024-04-11 | End: 2024-04-11 | Stop reason: HOSPADM

## 2024-04-11 RX ORDER — LIDOCAINE HYDROCHLORIDE 20 MG/ML
INJECTION, SOLUTION INFILTRATION; PERINEURAL PRN
Status: DISCONTINUED | OUTPATIENT
Start: 2024-04-11 | End: 2024-04-11

## 2024-04-11 RX ORDER — OXYCODONE HYDROCHLORIDE 5 MG/1
5 TABLET ORAL
Status: DISCONTINUED | OUTPATIENT
Start: 2024-04-11 | End: 2024-04-11 | Stop reason: HOSPADM

## 2024-04-11 RX ORDER — PROPOFOL 10 MG/ML
INJECTION, EMULSION INTRAVENOUS PRN
Status: DISCONTINUED | OUTPATIENT
Start: 2024-04-11 | End: 2024-04-11

## 2024-04-11 RX ORDER — ONDANSETRON 2 MG/ML
INJECTION INTRAMUSCULAR; INTRAVENOUS PRN
Status: DISCONTINUED | OUTPATIENT
Start: 2024-04-11 | End: 2024-04-11

## 2024-04-11 RX ORDER — NALOXONE HYDROCHLORIDE 0.4 MG/ML
0.1 INJECTION, SOLUTION INTRAMUSCULAR; INTRAVENOUS; SUBCUTANEOUS
Status: DISCONTINUED | OUTPATIENT
Start: 2024-04-11 | End: 2024-04-11 | Stop reason: HOSPADM

## 2024-04-11 RX ORDER — FENTANYL CITRATE 50 UG/ML
INJECTION, SOLUTION INTRAMUSCULAR; INTRAVENOUS PRN
Status: DISCONTINUED | OUTPATIENT
Start: 2024-04-11 | End: 2024-04-11

## 2024-04-11 RX ORDER — OXYCODONE HYDROCHLORIDE 10 MG/1
10 TABLET ORAL
Status: DISCONTINUED | OUTPATIENT
Start: 2024-04-11 | End: 2024-04-11 | Stop reason: HOSPADM

## 2024-04-11 RX ORDER — IOPAMIDOL 510 MG/ML
INJECTION, SOLUTION INTRAVASCULAR PRN
Status: DISCONTINUED | OUTPATIENT
Start: 2024-04-11 | End: 2024-04-11 | Stop reason: HOSPADM

## 2024-04-11 RX ADMIN — NOREPINEPHRINE BITARTRATE 6.4 MCG: 1 INJECTION, SOLUTION, CONCENTRATE INTRAVENOUS at 11:19

## 2024-04-11 RX ADMIN — SODIUM CHLORIDE, POTASSIUM CHLORIDE, SODIUM LACTATE AND CALCIUM CHLORIDE: 600; 310; 30; 20 INJECTION, SOLUTION INTRAVENOUS at 10:20

## 2024-04-11 RX ADMIN — PROPOFOL 150 MG: 10 INJECTION, EMULSION INTRAVENOUS at 10:27

## 2024-04-11 RX ADMIN — PHENYLEPHRINE HYDROCHLORIDE 300 MCG: 10 INJECTION INTRAVENOUS at 10:54

## 2024-04-11 RX ADMIN — DEXAMETHASONE SODIUM PHOSPHATE 4 MG: 4 INJECTION, SOLUTION INTRA-ARTICULAR; INTRALESIONAL; INTRAMUSCULAR; INTRAVENOUS; SOFT TISSUE at 10:32

## 2024-04-11 RX ADMIN — SUGAMMADEX 200 MG: 100 INJECTION, SOLUTION INTRAVENOUS at 11:48

## 2024-04-11 RX ADMIN — Medication 50 MG: at 10:28

## 2024-04-11 RX ADMIN — LIDOCAINE HYDROCHLORIDE 60 MG: 20 INJECTION, SOLUTION INFILTRATION; PERINEURAL at 10:26

## 2024-04-11 RX ADMIN — HYDROMORPHONE HYDROCHLORIDE 0.5 MG: 1 INJECTION, SOLUTION INTRAMUSCULAR; INTRAVENOUS; SUBCUTANEOUS at 10:56

## 2024-04-11 RX ADMIN — NOREPINEPHRINE BITARTRATE 6.4 MCG: 1 INJECTION, SOLUTION, CONCENTRATE INTRAVENOUS at 11:08

## 2024-04-11 RX ADMIN — FENTANYL CITRATE 100 MCG: 50 INJECTION INTRAMUSCULAR; INTRAVENOUS at 10:26

## 2024-04-11 RX ADMIN — PHENYLEPHRINE HYDROCHLORIDE 100 MCG: 10 INJECTION INTRAVENOUS at 10:57

## 2024-04-11 RX ADMIN — PHENYLEPHRINE HYDROCHLORIDE 200 MCG: 10 INJECTION INTRAVENOUS at 10:39

## 2024-04-11 RX ADMIN — Medication 10 MG: at 11:12

## 2024-04-11 RX ADMIN — MIDAZOLAM 2 MG: 1 INJECTION INTRAMUSCULAR; INTRAVENOUS at 10:17

## 2024-04-11 RX ADMIN — Medication 20 MG: at 10:53

## 2024-04-11 RX ADMIN — LEVOFLOXACIN 500 MG: 5 INJECTION, SOLUTION INTRAVENOUS at 10:32

## 2024-04-11 RX ADMIN — NOREPINEPHRINE BITARTRATE 6.4 MCG: 1 INJECTION, SOLUTION, CONCENTRATE INTRAVENOUS at 11:02

## 2024-04-11 RX ADMIN — ONDANSETRON 4 MG: 2 INJECTION INTRAMUSCULAR; INTRAVENOUS at 11:42

## 2024-04-11 ASSESSMENT — ACTIVITIES OF DAILY LIVING (ADL)
ADLS_ACUITY_SCORE: 20

## 2024-04-11 NOTE — ANESTHESIA POSTPROCEDURE EVALUATION
Patient: Duane Ramos    Procedure: Procedure(s):  ESOPHAGOGASTRODUODENOSCOPY, WITH ENDOSCOPIC ULTRASOUND, WITH ENDOSCOPIC Cystogastrostomy and with stent placement..       Anesthesia Type:  General    Note:  Disposition: Outpatient   Postop Pain Control: Uneventful            Sign Out: Well controlled pain   PONV: No   Neuro/Psych: Uneventful            Sign Out: Acceptable/Baseline neuro status   Airway/Respiratory:    CV/Hemodynamics: Uneventful            Sign Out: Acceptable CV status; No obvious hypovolemia; No obvious fluid overload   Other NRE: NONE   DID A NON-ROUTINE EVENT OCCUR? No           Last vitals:  Vitals Value Taken Time   /78 04/11/24 1230   Temp 36.2  C (97.2  F) 04/11/24 1200   Pulse 86 04/11/24 1238   Resp 12 04/11/24 1238   SpO2 96 % 04/11/24 1238   Vitals shown include unfiled device data.    Electronically Signed By: Duke Frazier MD  April 11, 2024  12:38 PM

## 2024-04-11 NOTE — BRIEF OP NOTE
[facility]    Brief Operative Note    Pre-operative diagnosis: Alcohol-induced acute pancreatitis without infection or necrosis [K85.20]  Pancreas hemorrhage [K86.89]  Post-operative diagnosis Same as pre-operative diagnosis    Procedure: ESOPHAGOGASTRODUODENOSCOPY, WITH ENDOSCOPIC ULTRASOUND, WITH ENDOSCOPIC EXCISION OF NECROTIC PANCREATIC TISSUE with stent placement., N/A - Esophagus    Surgeon: Surgeons and Role:     * Marc Gallardo MD - Primary  Anesthesia: General   Estimated Blood Loss: None    Drains: None  Specimens: * No specimens in log *  Findings:   Very large walled-off necrosis seen posterior to the stomach and extending into the pancreatic head. Compresses the antrum/gastric outlet and the common bile duct.  No pseudaneurysms seen.    No stones in the biliary tree/gallbladder.    Successful EUS-guided cystgastrostomy x 2 with 10mm length/15 mm mahad Hot Axios stents. Two coaxial 10F x 1 cm Solus stents placed across both Axios stents.    Copious fluid aspirated. No fresh blood.  No free subdiaphragmatic gas.    Less solid necrosis than expected.  Complications: None.  Implants:   Implant Name Type Inv. Item Serial No.  Lot No. LRB No. Used Action   STENT ESU AXIOS W/DEL SYS [id number] 10.8FR 138CM [id number] - [id number] Stent STENT ESU AXIOS W/DEL SYS [id number] 10.8FR 138CM [id number]  [employer] [id number] N/A 1 Implanted   STENT ESU AXIOS W/DEL SYS [id number] 10.8FR 138CM [id number] - [id number] Stent STENT ESU AXIOS W/DEL SYS [id number] 10.8FR 138CM [id number]  [employer] [id number] N/A 1 Implanted   STENT SOLUS BILIARY [id number] DBL PIGTAIL W/INTRO [id number] - [id number] Stent STENT SOLUS BILIARY [id number] DBL PIGTAIL W/INTRO [id number]  [employer] [id number] N/A 1 Implanted   STENT SOLUS BILIARY [id number] DBL PIGTAIL W/INTRO [id number] - [id number] Stent STENT SOLUS BILIARY [id number] DBL PIGTAIL W/INTRO [id number]  [facility] [employer]  [id number] N/A 1 Implanted   STENT SOLUS BILIARY [id number] DBL PIGTAIL W/INTRO [id number] - [id number] Stent STENT SOLUS BILIARY [id number] DBL PIGTAIL W/INTRO [id number]  [employer] [id number] N/A 1 Implanted   STENT SOLUS BILIARY [id number] DBL PIGTAIL W/INTRO [id number] - [id number] Stent STENT SOLUS BILIARY [id number] DBL PIGTAIL W/INTRO [id number]  [employer] [id number] N/A 1 Implanted     ADARSH Gallardo MD  Professor of Medicine  Division of Gastroenterology, Hepatology and Nutrition  [facility]

## 2024-04-11 NOTE — OR NURSING
Dr Gallardo came to phase two to update patient about the procedures and answered questions.  Pt is alert and oriented x4  Denies pain/nausea at this time  VSS    Discharged instructions given to patient and father, questions and concerns addressed.  Father was instructed to  patient's prescription medication from the discharge pharmacy.    Fabby CENTENO

## 2024-04-11 NOTE — ANESTHESIA PREPROCEDURE EVALUATION
Anesthesia Pre-Procedure Evaluation    Patient: Duane Ramos   MRN: [id number] : 1995        Procedure : Procedure(s):  ESOPHAGOGASTRODUODENOSCOPY, WITH ENDOSCOPIC ULTRASOUND, WITH ENDOSCOPIC EXCISION OF NECROTIC PANCREATIC TISSUE          History reviewed. No pertinent past medical history.   Past Surgical History:   Procedure Laterality Date    IR VISCERAL ANGIOGRAM  3/24/2024    OTHER SURGICAL HISTORY Right 2008    broken clavicle    [place] LAP,APPENDECTOMY N/A 2017    Procedure: APPENDECTOMY, LAPAROSCOPIC;  Surgeon: Milton Francois MD;  Location: [facility];  Service: General      No Known Allergies   Social History     Tobacco Use    Smoking status: Former     Types: Cigarettes    Smokeless tobacco: Never   Substance Use Topics    Alcohol use: Yes      Wt Readings from Last 1 Encounters:   24 77.2 kg (170 lb 3.1 oz)        Anesthesia Evaluation   Pt has had prior anesthetic. Type: MAC.        ROS/MED HX  ENT/Pulmonary:     (+)     LOWELL risk factors,                                   Neurologic:  - neg neurologic ROS     Cardiovascular:       METS/Exercise Tolerance: >4 METS    Hematologic:     (+)      anemia, history of blood transfusion, no previous transfusion reaction, Known PRBC Anitbodies:No       Musculoskeletal:  - neg musculoskeletal ROS     GI/Hepatic: Comment: Necrotizing pancreatitis complicated with hemorrhage      Renal/Genitourinary:  - neg Renal ROS     Endo:  - neg endo ROS     Psychiatric/Substance Use:     (+)   alcohol abuse      Infectious Disease:  - neg infectious disease ROS     Malignancy:  - neg malignancy ROS     Other:            Physical Exam    Airway        Mallampati: II   TM distance: > 3 FB   Neck ROM: full   Mouth opening: > 3 cm    Respiratory Devices and Support         Dental       (+) Multiple crowns, permanant bridges      Cardiovascular   cardiovascular exam normal          Pulmonary   pulmonary exam normal                OUTSIDE LABS:  CBC:  "  Lab Results   Component Value Date    WBC 9.9 04/01/2024    WBC 5.1 03/28/2024    HGB 11.2 (L) 04/01/2024    HGB 9.7 (L) 03/28/2024    HCT 35.4 (L) 04/01/2024    HCT 29.5 (L) 03/28/2024     04/01/2024     03/28/2024     BMP:   Lab Results   Component Value Date     04/01/2024     03/28/2024    POTASSIUM 4.2 04/01/2024    POTASSIUM 3.7 03/28/2024    CHLORIDE 99 04/01/2024    CHLORIDE 103 03/28/2024    CO2 23 04/01/2024    CO2 23 03/28/2024    BUN 12.6 04/01/2024    BUN 6.5 03/28/2024    CR 1.12 04/01/2024    CR 0.74 03/28/2024     (H) 04/01/2024     (H) 03/28/2024     COAGS:   Lab Results   Component Value Date    PTT 31 03/24/2024    INR 1.32 (H) 04/01/2024    FIBR 320 03/24/2024     POC: No results found for: \"BGM\", \"HCG\", \"HCGS\"  HEPATIC:   Lab Results   Component Value Date    ALBUMIN 4.4 04/01/2024    PROTTOTAL 7.9 04/01/2024     (H) 04/01/2024    AST 66 (H) 04/01/2024    ALKPHOS 79 04/01/2024    BILITOTAL 0.6 04/01/2024     OTHER:   Lab Results   Component Value Date    LACT 1.8 03/23/2024    A1C 5.3 03/28/2024    BUZZ 9.9 04/01/2024    PHOS 4.3 02/11/2024    MAG 2.2 02/11/2024    LIPASE 884 (H) 03/23/2024       Anesthesia Plan    ASA Status:  3    NPO Status:  NPO Appropriate    Anesthesia Type: General.     - Airway: ETT   Induction: Intravenous.   Maintenance: Balanced.   Techniques and Equipment:       - Blood: T&S     Consents    Anesthesia Plan(s) and associated risks, benefits, and realistic alternatives discussed. Questions answered and patient/representative(s) expressed understanding.     - Discussed:     - Discussed with:  Patient      - Extended Intubation/Ventilatory Support Discussed: Yes.      - Patient is DNR/DNI Status: No     Use of blood products discussed: Yes.     - Discussed with: Patient.     - Consented: consented to blood products     Postoperative Care    Pain management: Multi-modal analgesia.   PONV prophylaxis: Ondansetron (or other " "5HT-3)     Comments:              PAC Discussion and Assessment    ASA Classification: 3    Anesthetic techniques and relevant risks discussed: GA  Invasive monitoring and risk discussed: No    Possibility and Risk of blood transfusion discussed: Yes  NPO instructions given: NPO after midnight    Needs early admission to pre-op area: No                                            Jelly Mesa MD    I have reviewed the pertinent notes and labs in the chart from the past 30 days and (re)examined the patient.  Any updates or changes from those notes are reflected in this note.       # Hypocalcemia: Lowest Ca = 8.4 mg/dL in last 30 days, will monitor and replace as appropriate      # Coagulation Defect: INR = 1.32 (Ref range: 0.85 - 1.15) and/or PTT = 31 Seconds (Ref range: 22 - 38 Seconds), will monitor for bleeding   # Overweight: Estimated body mass index is 26.66 kg/m  as calculated from the following:    Height as of this encounter: 1.702 m (5' 7\").    Weight as of this encounter: 77.2 kg (170 lb 3.1 oz).      "

## 2024-04-11 NOTE — ANESTHESIA PROCEDURE NOTES
Airway       Patient location during procedure: OR       Procedure Start/Stop Times: 4/11/2024 10:30 AM  Staff -        Anesthesiologist:  Jelly Mesa MD       CRNA: Jermaine Shrestha APRN CRNA       Performed By: CRNA  Consent for Airway        Urgency: elective  Indications and Patient Condition       Indications for airway management: evette-procedural       Induction type:intravenous       Mask difficulty assessment: 1 - vent by mask    Final Airway Details       Final airway type: endotracheal airway       Successful airway: ETT - single and Oral  Endotracheal Airway Details        ETT size (mm): 7.5       Cuffed: yes       Successful intubation technique: direct laryngoscopy       DL Blade Type: MAC 3       Grade View of Cords: 1       Adjucts: stylet       Position: Right       Measured from: gums/teeth       Secured at (cm): 23       Bite block used: None    Post intubation assessment        Placement verified by: capnometry, equal breath sounds and chest rise        Number of attempts at approach: 1       Number of other approaches attempted: 0       Secured with: tape       Ease of procedure: easy       Dentition: Unchanged (unchanged from prior condition)    Medication(s) Administered   Medication Administration Time: 4/11/2024 10:30 AM

## 2024-04-11 NOTE — TELEPHONE ENCOUNTER
Sarah Beth:    Please schedule CT abdomen with IV contrast next Wed 4/17 so available prior to Thursday's procedure.  Ind - follow-up after endoscopic drainage of walled-off necrosis.    ADARSH Gallardo MD  Professor of Medicine  Division of Gastroenterology, Hepatology and Nutrition  [facility]

## 2024-04-11 NOTE — ANESTHESIA CARE TRANSFER NOTE
Patient: Duane Ramos    Procedure: Procedure(s):  ESOPHAGOGASTRODUODENOSCOPY, WITH ENDOSCOPIC ULTRASOUND, WITH ENDOSCOPIC Cystogastrostomy and with stent placement..       Diagnosis: Alcohol-induced acute pancreatitis without infection or necrosis [K85.20]  Pancreas hemorrhage [K86.89]  Diagnosis Additional Information: No value filed.    Anesthesia Type:   General     Note:    Oropharynx: oropharynx clear of all foreign objects and spontaneously breathing  Level of Consciousness: awake  Oxygen Supplementation: face mask  Level of Supplemental Oxygen (L/min / FiO2): 6 (6)  Independent Airway: airway patency satisfactory and stable  Dentition: dentition unchanged  Vital Signs Stable: post-procedure vital signs reviewed and stable  Report to RN Given: handoff report given  Patient transferred to: PACU    Handoff Report: Identifed the Patient, Identified the Reponsible Provider, Reviewed the pertinent medical history, Discussed the surgical course, Reviewed Intra-OP anesthesia mangement and issues during anesthesia, Set expectations for post-procedure period and Allowed opportunity for questions and acknowledgement of understanding      Vitals:  Vitals Value Taken Time   /72 04/11/24 1203   Temp 36    Pulse 96 04/11/24 1203   Resp 12    SpO2 99 % 04/11/24 1203   Vitals shown include unfiled device data.    Electronically Signed By: MOHSEN BATEMAN CRNA  April 11, 2024  12:04 PM

## 2024-04-15 ENCOUNTER — MYC MEDICAL ADVICE (OUTPATIENT)
Dept: GASTROENTEROLOGY | Facility: CLINIC | Age: 29
End: 2024-04-15
Payer: COMMERCIAL

## 2024-04-15 ENCOUNTER — PATIENT OUTREACH (OUTPATIENT)
Dept: GASTROENTEROLOGY | Facility: CLINIC | Age: 29
End: 2024-04-15
Payer: COMMERCIAL

## 2024-04-15 NOTE — TELEPHONE ENCOUNTER
Called pt to check in post op and discuss plans for CT on Weds, EUS/necrosectomy on Thurs. Pt said his pain is almost gone, minimal in comparison to prior to procedure. Pt said his appetite came back and he has not had any fevers.  Did discuss the CT abd with contrast timing, pt aware of appointment. The CT abd/pelvis without contrast ordered by urology is scheduled on Sun, will send msg to that dept to see if our CT would suffice and their CT can be cancelled. Pt states his kidney stone pain is minimal as well. Pt made aware of arrival time for procedure on 4/18, briefly discussed NPO guidelines. No questions.    Indu Smith, RN, BSN,   [facility]  Care coordinator

## 2024-04-16 LAB — UPPER EUS: NORMAL

## 2024-04-16 RX ORDER — OXYCODONE HYDROCHLORIDE 5 MG/1
5 TABLET ORAL EVERY 6 HOURS PRN
COMMUNITY
End: 2024-05-17

## 2024-04-17 ENCOUNTER — HOSPITAL ENCOUNTER (OUTPATIENT)
Dept: CT IMAGING | Facility: CLINIC | Age: 29
Discharge: HOME | End: 2024-04-17
Attending: INTERNAL MEDICINE | Admitting: INTERNAL MEDICINE
Payer: COMMERCIAL

## 2024-04-17 ENCOUNTER — ANESTHESIA EVENT (OUTPATIENT)
Dept: SURGERY | Facility: CLINIC | Age: 29
End: 2024-04-17
Payer: COMMERCIAL

## 2024-04-17 DIAGNOSIS — K85.20 ALCOHOL-INDUCED ACUTE PANCREATITIS WITHOUT INFECTION OR NECROSIS: ICD-10-CM

## 2024-04-17 PROCEDURE — 74177 CT ABD & PELVIS W/CONTRAST: CPT

## 2024-04-17 PROCEDURE — 250N000009 HC RX 250: Performed by: INTERNAL MEDICINE

## 2024-04-17 PROCEDURE — 250N000011 HC RX IP 250 OP 636: Performed by: INTERNAL MEDICINE

## 2024-04-17 RX ORDER — IOPAMIDOL 755 MG/ML
83 INJECTION, SOLUTION INTRAVASCULAR ONCE
Status: COMPLETED | OUTPATIENT
Start: 2024-04-17 | End: 2024-04-17

## 2024-04-17 RX ADMIN — IOPAMIDOL 83 ML: 755 INJECTION, SOLUTION INTRAVENOUS at 13:49

## 2024-04-17 RX ADMIN — SODIUM CHLORIDE 61 ML: 9 INJECTION, SOLUTION INTRAVENOUS at 13:49

## 2024-04-18 ENCOUNTER — ANESTHESIA (OUTPATIENT)
Dept: SURGERY | Facility: CLINIC | Age: 29
End: 2024-04-18
Payer: COMMERCIAL

## 2024-04-18 ENCOUNTER — HOSPITAL ENCOUNTER (OUTPATIENT)
Facility: CLINIC | Age: 29
Discharge: HOME | End: 2024-04-18
Attending: INTERNAL MEDICINE | Admitting: INTERNAL MEDICINE
Payer: COMMERCIAL

## 2024-04-18 ENCOUNTER — TELEPHONE (OUTPATIENT)
Dept: GASTROENTEROLOGY | Facility: CLINIC | Age: 29
End: 2024-04-18
Payer: COMMERCIAL

## 2024-04-18 ENCOUNTER — APPOINTMENT (OUTPATIENT)
Dept: GENERAL RADIOLOGY | Facility: CLINIC | Age: 29
End: 2024-04-18
Attending: INTERNAL MEDICINE
Payer: COMMERCIAL

## 2024-04-18 VITALS
HEIGHT: 67 IN | DIASTOLIC BLOOD PRESSURE: 73 MMHG | SYSTOLIC BLOOD PRESSURE: 115 MMHG | TEMPERATURE: 97.8 F | WEIGHT: 164.24 LBS | HEART RATE: 78 BPM | OXYGEN SATURATION: 99 % | RESPIRATION RATE: 16 BRPM | BODY MASS INDEX: 25.78 KG/M2

## 2024-04-18 DIAGNOSIS — K85.20 ALCOHOL-INDUCED ACUTE PANCREATITIS: Primary | ICD-10-CM

## 2024-04-18 DIAGNOSIS — K85.91 NECROTIZING PANCREATITIS: ICD-10-CM

## 2024-04-18 LAB — UPPER GI ENDOSCOPY: NORMAL

## 2024-04-18 PROCEDURE — 43237 ENDOSCOPIC US EXAM ESOPH: CPT | Performed by: STUDENT IN AN ORGANIZED HEALTH CARE EDUCATION/TRAINING PROGRAM

## 2024-04-18 PROCEDURE — 43237 ENDOSCOPIC US EXAM ESOPH: CPT | Performed by: ANESTHESIOLOGY

## 2024-04-18 PROCEDURE — 710N000012 HC RECOVERY PHASE 2, PER MINUTE: Performed by: INTERNAL MEDICINE

## 2024-04-18 PROCEDURE — 250N000011 HC RX IP 250 OP 636: Performed by: STUDENT IN AN ORGANIZED HEALTH CARE EDUCATION/TRAINING PROGRAM

## 2024-04-18 PROCEDURE — 710N000009 HC RECOVERY PHASE 1, LEVEL 1, PER MIN: Performed by: INTERNAL MEDICINE

## 2024-04-18 PROCEDURE — 250N000009 HC RX 250: Performed by: STUDENT IN AN ORGANIZED HEALTH CARE EDUCATION/TRAINING PROGRAM

## 2024-04-18 PROCEDURE — C2625 STENT, NON-COR, TEM W/DEL SY: HCPCS | Performed by: INTERNAL MEDICINE

## 2024-04-18 PROCEDURE — 250N000025 HC SEVOFLURANE, PER MIN: Performed by: INTERNAL MEDICINE

## 2024-04-18 PROCEDURE — 370N000017 HC ANESTHESIA TECHNICAL FEE, PER MIN: Performed by: INTERNAL MEDICINE

## 2024-04-18 PROCEDURE — C1769 GUIDE WIRE: HCPCS | Performed by: INTERNAL MEDICINE

## 2024-04-18 PROCEDURE — C1726 CATH, BAL DIL, NON-VASCULAR: HCPCS | Performed by: INTERNAL MEDICINE

## 2024-04-18 PROCEDURE — 258N000003 HC RX IP 258 OP 636: Performed by: STUDENT IN AN ORGANIZED HEALTH CARE EDUCATION/TRAINING PROGRAM

## 2024-04-18 PROCEDURE — 255N000002 HC RX 255 OP 636: Performed by: INTERNAL MEDICINE

## 2024-04-18 PROCEDURE — 360N000083 HC SURGERY LEVEL 3 W/ FLUORO, PER MIN: Performed by: INTERNAL MEDICINE

## 2024-04-18 PROCEDURE — 272N000001 HC OR GENERAL SUPPLY STERILE: Performed by: INTERNAL MEDICINE

## 2024-04-18 PROCEDURE — 999N000179 XR SURGERY CARM FLUORO LESS THAN 5 MIN W STILLS: Mod: TC

## 2024-04-18 PROCEDURE — 999N000141 HC STATISTIC PRE-PROCEDURE NURSING ASSESSMENT: Performed by: INTERNAL MEDICINE

## 2024-04-18 DEVICE — SOLUS, DOUBLE PIGTAIL STENT WITH INTRODUCER
Type: IMPLANTABLE DEVICE | Site: STOMACH | Status: FUNCTIONAL
Brand: SOLUS

## 2024-04-18 RX ORDER — LIDOCAINE 40 MG/G
CREAM TOPICAL
Status: DISCONTINUED | OUTPATIENT
Start: 2024-04-18 | End: 2024-04-18 | Stop reason: HOSPADM

## 2024-04-18 RX ORDER — NALOXONE HYDROCHLORIDE 0.4 MG/ML
0.1 INJECTION, SOLUTION INTRAMUSCULAR; INTRAVENOUS; SUBCUTANEOUS
Status: DISCONTINUED | OUTPATIENT
Start: 2024-04-18 | End: 2024-04-18 | Stop reason: HOSPADM

## 2024-04-18 RX ORDER — DIPHENHYDRAMINE HCL 25 MG
25 CAPSULE ORAL EVERY 6 HOURS PRN
Status: DISCONTINUED | OUTPATIENT
Start: 2024-04-18 | End: 2024-04-18 | Stop reason: HOSPADM

## 2024-04-18 RX ORDER — ONDANSETRON 4 MG/1
4 TABLET, ORALLY DISINTEGRATING ORAL EVERY 30 MIN PRN
Status: DISCONTINUED | OUTPATIENT
Start: 2024-04-18 | End: 2024-04-18 | Stop reason: HOSPADM

## 2024-04-18 RX ORDER — HYDROMORPHONE HCL IN WATER/PF 6 MG/30 ML
0.2 PATIENT CONTROLLED ANALGESIA SYRINGE INTRAVENOUS EVERY 5 MIN PRN
Status: DISCONTINUED | OUTPATIENT
Start: 2024-04-18 | End: 2024-04-18 | Stop reason: HOSPADM

## 2024-04-18 RX ORDER — OXYCODONE HYDROCHLORIDE 5 MG/1
5 TABLET ORAL
Status: DISCONTINUED | OUTPATIENT
Start: 2024-04-18 | End: 2024-04-18 | Stop reason: HOSPADM

## 2024-04-18 RX ORDER — ONDANSETRON 2 MG/ML
4 INJECTION INTRAMUSCULAR; INTRAVENOUS EVERY 30 MIN PRN
Status: DISCONTINUED | OUTPATIENT
Start: 2024-04-18 | End: 2024-04-18 | Stop reason: HOSPADM

## 2024-04-18 RX ORDER — ACETAMINOPHEN 325 MG/1
975 TABLET ORAL ONCE
Status: DISCONTINUED | OUTPATIENT
Start: 2024-04-18 | End: 2024-04-18 | Stop reason: HOSPADM

## 2024-04-18 RX ORDER — ONDANSETRON 2 MG/ML
4 INJECTION INTRAMUSCULAR; INTRAVENOUS EVERY 6 HOURS PRN
Status: DISCONTINUED | OUTPATIENT
Start: 2024-04-18 | End: 2024-04-18 | Stop reason: HOSPADM

## 2024-04-18 RX ORDER — DEXAMETHASONE SODIUM PHOSPHATE 4 MG/ML
INJECTION, SOLUTION INTRA-ARTICULAR; INTRALESIONAL; INTRAMUSCULAR; INTRAVENOUS; SOFT TISSUE PRN
Status: DISCONTINUED | OUTPATIENT
Start: 2024-04-18 | End: 2024-04-18

## 2024-04-18 RX ORDER — NALOXONE HYDROCHLORIDE 0.4 MG/ML
0.2 INJECTION, SOLUTION INTRAMUSCULAR; INTRAVENOUS; SUBCUTANEOUS
Status: DISCONTINUED | OUTPATIENT
Start: 2024-04-18 | End: 2024-04-18 | Stop reason: HOSPADM

## 2024-04-18 RX ORDER — METOPROLOL TARTRATE 1 MG/ML
1-2 INJECTION, SOLUTION INTRAVENOUS EVERY 5 MIN PRN
Status: DISCONTINUED | OUTPATIENT
Start: 2024-04-18 | End: 2024-04-18 | Stop reason: HOSPADM

## 2024-04-18 RX ORDER — FENTANYL CITRATE 50 UG/ML
INJECTION, SOLUTION INTRAMUSCULAR; INTRAVENOUS PRN
Status: DISCONTINUED | OUTPATIENT
Start: 2024-04-18 | End: 2024-04-18

## 2024-04-18 RX ORDER — DEXAMETHASONE SODIUM PHOSPHATE 4 MG/ML
4 INJECTION, SOLUTION INTRA-ARTICULAR; INTRALESIONAL; INTRAMUSCULAR; INTRAVENOUS; SOFT TISSUE
Status: DISCONTINUED | OUTPATIENT
Start: 2024-04-18 | End: 2024-04-18 | Stop reason: HOSPADM

## 2024-04-18 RX ORDER — DIPHENHYDRAMINE HYDROCHLORIDE 50 MG/ML
25 INJECTION INTRAMUSCULAR; INTRAVENOUS EVERY 6 HOURS PRN
Status: DISCONTINUED | OUTPATIENT
Start: 2024-04-18 | End: 2024-04-18 | Stop reason: HOSPADM

## 2024-04-18 RX ORDER — FENTANYL CITRATE 50 UG/ML
50 INJECTION, SOLUTION INTRAMUSCULAR; INTRAVENOUS EVERY 5 MIN PRN
Status: DISCONTINUED | OUTPATIENT
Start: 2024-04-18 | End: 2024-04-18 | Stop reason: HOSPADM

## 2024-04-18 RX ORDER — ONDANSETRON 4 MG/1
4 TABLET, ORALLY DISINTEGRATING ORAL EVERY 6 HOURS PRN
Status: DISCONTINUED | OUTPATIENT
Start: 2024-04-18 | End: 2024-04-18 | Stop reason: HOSPADM

## 2024-04-18 RX ORDER — IOPAMIDOL 510 MG/ML
INJECTION, SOLUTION INTRAVASCULAR PRN
Status: DISCONTINUED | OUTPATIENT
Start: 2024-04-18 | End: 2024-04-18 | Stop reason: HOSPADM

## 2024-04-18 RX ORDER — HYDRALAZINE HYDROCHLORIDE 20 MG/ML
2.5-5 INJECTION INTRAMUSCULAR; INTRAVENOUS EVERY 10 MIN PRN
Status: DISCONTINUED | OUTPATIENT
Start: 2024-04-18 | End: 2024-04-18 | Stop reason: HOSPADM

## 2024-04-18 RX ORDER — NALOXONE HYDROCHLORIDE 0.4 MG/ML
0.4 INJECTION, SOLUTION INTRAMUSCULAR; INTRAVENOUS; SUBCUTANEOUS
Status: DISCONTINUED | OUTPATIENT
Start: 2024-04-18 | End: 2024-04-18 | Stop reason: HOSPADM

## 2024-04-18 RX ORDER — LIDOCAINE HYDROCHLORIDE 20 MG/ML
INJECTION, SOLUTION INFILTRATION; PERINEURAL PRN
Status: DISCONTINUED | OUTPATIENT
Start: 2024-04-18 | End: 2024-04-18

## 2024-04-18 RX ORDER — SODIUM CHLORIDE, SODIUM LACTATE, POTASSIUM CHLORIDE, CALCIUM CHLORIDE 600; 310; 30; 20 MG/100ML; MG/100ML; MG/100ML; MG/100ML
INJECTION, SOLUTION INTRAVENOUS CONTINUOUS
Status: DISCONTINUED | OUTPATIENT
Start: 2024-04-18 | End: 2024-04-18 | Stop reason: HOSPADM

## 2024-04-18 RX ORDER — FENTANYL CITRATE 50 UG/ML
25 INJECTION, SOLUTION INTRAMUSCULAR; INTRAVENOUS EVERY 5 MIN PRN
Status: DISCONTINUED | OUTPATIENT
Start: 2024-04-18 | End: 2024-04-18 | Stop reason: HOSPADM

## 2024-04-18 RX ORDER — KETOROLAC TROMETHAMINE 30 MG/ML
15 INJECTION, SOLUTION INTRAMUSCULAR; INTRAVENOUS
Status: DISCONTINUED | OUTPATIENT
Start: 2024-04-18 | End: 2024-04-18 | Stop reason: HOSPADM

## 2024-04-18 RX ORDER — FENTANYL CITRATE 50 UG/ML
25 INJECTION, SOLUTION INTRAMUSCULAR; INTRAVENOUS
Status: DISCONTINUED | OUTPATIENT
Start: 2024-04-18 | End: 2024-04-18 | Stop reason: HOSPADM

## 2024-04-18 RX ORDER — FLUMAZENIL 0.1 MG/ML
0.2 INJECTION, SOLUTION INTRAVENOUS
Status: DISCONTINUED | OUTPATIENT
Start: 2024-04-18 | End: 2024-04-18 | Stop reason: HOSPADM

## 2024-04-18 RX ORDER — PROPOFOL 10 MG/ML
INJECTION, EMULSION INTRAVENOUS PRN
Status: DISCONTINUED | OUTPATIENT
Start: 2024-04-18 | End: 2024-04-18

## 2024-04-18 RX ORDER — OXYCODONE HYDROCHLORIDE 10 MG/1
10 TABLET ORAL
Status: DISCONTINUED | OUTPATIENT
Start: 2024-04-18 | End: 2024-04-18 | Stop reason: HOSPADM

## 2024-04-18 RX ORDER — HYDROMORPHONE HCL IN WATER/PF 6 MG/30 ML
0.4 PATIENT CONTROLLED ANALGESIA SYRINGE INTRAVENOUS EVERY 5 MIN PRN
Status: DISCONTINUED | OUTPATIENT
Start: 2024-04-18 | End: 2024-04-18 | Stop reason: HOSPADM

## 2024-04-18 RX ORDER — SODIUM CHLORIDE, SODIUM LACTATE, POTASSIUM CHLORIDE, CALCIUM CHLORIDE 600; 310; 30; 20 MG/100ML; MG/100ML; MG/100ML; MG/100ML
INJECTION, SOLUTION INTRAVENOUS CONTINUOUS PRN
Status: DISCONTINUED | OUTPATIENT
Start: 2024-04-18 | End: 2024-04-18

## 2024-04-18 RX ORDER — ONDANSETRON 2 MG/ML
INJECTION INTRAMUSCULAR; INTRAVENOUS PRN
Status: DISCONTINUED | OUTPATIENT
Start: 2024-04-18 | End: 2024-04-18

## 2024-04-18 RX ORDER — AMPICILLIN AND SULBACTAM 1; .5 G/1; G/1
INJECTION, POWDER, FOR SOLUTION INTRAMUSCULAR; INTRAVENOUS PRN
Status: DISCONTINUED | OUTPATIENT
Start: 2024-04-18 | End: 2024-04-18

## 2024-04-18 RX ADMIN — PHENYLEPHRINE HYDROCHLORIDE 100 MCG: 10 INJECTION INTRAVENOUS at 07:52

## 2024-04-18 RX ADMIN — Medication 50 MG: at 07:40

## 2024-04-18 RX ADMIN — FENTANYL CITRATE 100 MCG: 50 INJECTION INTRAMUSCULAR; INTRAVENOUS at 07:40

## 2024-04-18 RX ADMIN — PHENYLEPHRINE HYDROCHLORIDE 100 MCG: 10 INJECTION INTRAVENOUS at 08:06

## 2024-04-18 RX ADMIN — PHENYLEPHRINE HYDROCHLORIDE 100 MCG: 10 INJECTION INTRAVENOUS at 08:19

## 2024-04-18 RX ADMIN — MIDAZOLAM 2 MG: 1 INJECTION INTRAMUSCULAR; INTRAVENOUS at 07:33

## 2024-04-18 RX ADMIN — DEXAMETHASONE SODIUM PHOSPHATE 4 MG: 4 INJECTION, SOLUTION INTRA-ARTICULAR; INTRALESIONAL; INTRAMUSCULAR; INTRAVENOUS; SOFT TISSUE at 07:40

## 2024-04-18 RX ADMIN — PHENYLEPHRINE HYDROCHLORIDE 200 MCG: 10 INJECTION INTRAVENOUS at 08:35

## 2024-04-18 RX ADMIN — PHENYLEPHRINE HYDROCHLORIDE 100 MCG: 10 INJECTION INTRAVENOUS at 09:00

## 2024-04-18 RX ADMIN — PHENYLEPHRINE HYDROCHLORIDE 200 MCG: 10 INJECTION INTRAVENOUS at 08:25

## 2024-04-18 RX ADMIN — PROPOFOL 200 MG: 10 INJECTION, EMULSION INTRAVENOUS at 07:40

## 2024-04-18 RX ADMIN — ONDANSETRON 4 MG: 2 INJECTION INTRAMUSCULAR; INTRAVENOUS at 07:45

## 2024-04-18 RX ADMIN — AMPICILLIN SODIUM AND SULBACTAM SODIUM 3 G: 1; .5 INJECTION, POWDER, FOR SOLUTION INTRAMUSCULAR; INTRAVENOUS at 07:32

## 2024-04-18 RX ADMIN — SODIUM CHLORIDE, POTASSIUM CHLORIDE, SODIUM LACTATE AND CALCIUM CHLORIDE: 600; 310; 30; 20 INJECTION, SOLUTION INTRAVENOUS at 07:30

## 2024-04-18 RX ADMIN — SUGAMMADEX 150 MG: 100 INJECTION, SOLUTION INTRAVENOUS at 09:06

## 2024-04-18 RX ADMIN — LIDOCAINE HYDROCHLORIDE 100 MG: 20 INJECTION, SOLUTION INFILTRATION; PERINEURAL at 07:40

## 2024-04-18 ASSESSMENT — ACTIVITIES OF DAILY LIVING (ADL)
ADLS_ACUITY_SCORE: 31

## 2024-04-18 ASSESSMENT — LIFESTYLE VARIABLES: TOBACCO_USE: 1

## 2024-04-18 NOTE — ANESTHESIA PREPROCEDURE EVALUATION
Anesthesia Pre-Procedure Evaluation    Patient: Duane Ramos   MRN: [id number] : 1995        Procedure : Procedure(s):  ESOPHAGOGASTRODUODENOSCOPY, WITH ENDOSCOPIC ULTRASOUND, WITH ENDOSCOPIC EXCISION OF NECROTIC PANCREATIC TISSUE          History reviewed. No pertinent past medical history.   Past Surgical History:   Procedure Laterality Date    ENDOSCOPIC ULTRASOUND, ESOPHAGOSCOPY, GASTROSCOPY, DUODENOSCOPY (EGD), NECROSECTOMY N/A 2024    Procedure: ESOPHAGOGASTRODUODENOSCOPY, WITH ENDOSCOPIC ULTRASOUND, WITH ENDOSCOPIC Cystogastrostomy and with stent placement..;  Surgeon: Marc Gallardo MD;  Location: [facility] OR    IR VISCERAL ANGIOGRAM  3/24/2024    OTHER SURGICAL HISTORY Right 2008    broken clavicle    [place] LAP,APPENDECTOMY N/A 2017    Procedure: APPENDECTOMY, LAPAROSCOPIC;  Surgeon: Milton Francois MD;  Location: [facility] OR;  Service: General      No Known Allergies   Social History     Tobacco Use    Smoking status: Former     Types: Cigarettes    Smokeless tobacco: Former    Tobacco comments:     Quit chewing tobacco a year ago   Substance Use Topics    Alcohol use: Not Currently      Wt Readings from Last 1 Encounters:   24 74.5 kg (164 lb 3.9 oz)        Anesthesia Evaluation            ROS/MED HX  ENT/Pulmonary:     (+)                tobacco use, Past use,                       Neurologic:  - neg neurologic ROS     Cardiovascular:  - neg cardiovascular ROS     METS/Exercise Tolerance: >4 METS    Hematologic: Comments: Lab Test        24                       0913          1310          0800          1231          1011          WBC          5.2          9.9          5.1            < >         --           HGB          10.2*        11.2*        9.7*           < >        7.9*          MCV          84           86           86             < >         --           PLT          248          386          235             < >         --           INR           --          1.32*         --           --          1.23*          < > = values in this interval not displayed.                  Lab Test        04/01/24 03/28/24 03/27/24 03/26/24 03/26/24                       1310          0800          0756          1557          0536          NA           137          139           --           --          139           POTASSIUM    4.2          3.7          3.7            < >        3.4           CHLORIDE     99           103           --           --          107           CO2          23           23            --           --          22            BUN          12.6         6.5           --           --          4.4*          CR           1.12         0.74          --           --          0.75          ANIONGAP     15           13            --           --          10            BUZZ          9.9          9.4           --           --          8.4*          GLC          135*         103*          --           --          83             < > = values in this interval not displayed.                     (+)      anemia,          Musculoskeletal:  - neg musculoskeletal ROS     GI/Hepatic: Comment: Necrotizing pancreatitis      Renal/Genitourinary:  - neg Renal ROS     Endo:  - neg endo ROS     Psychiatric/Substance Use:     (+)   alcohol abuse      Infectious Disease:  - neg infectious disease ROS     Malignancy:  - neg malignancy ROS     Other:  - neg other ROS          Physical Exam    Airway        Mallampati: II   TM distance: > 3 FB   Neck ROM: full   Mouth opening: > 3 cm    Respiratory Devices and Support         Dental       (+) Multiple crowns, permanant bridges      Cardiovascular   cardiovascular exam normal          Pulmonary   pulmonary exam normal                OUTSIDE LABS:  CBC:   Lab Results   Component Value Date    WBC 5.2 04/11/2024    WBC 9.9 04/01/2024    HGB 10.2 (L) 04/11/2024    HGB 11.2 (L)  "04/01/2024    HCT 31.2 (L) 04/11/2024    HCT 35.4 (L) 04/01/2024     04/11/2024     04/01/2024     BMP:   Lab Results   Component Value Date     04/01/2024     03/28/2024    POTASSIUM 4.2 04/01/2024    POTASSIUM 3.7 03/28/2024    CHLORIDE 99 04/01/2024    CHLORIDE 103 03/28/2024    CO2 23 04/01/2024    CO2 23 03/28/2024    BUN 12.6 04/01/2024    BUN 6.5 03/28/2024    CR 1.12 04/01/2024    CR 0.74 03/28/2024     (H) 04/01/2024     (H) 03/28/2024     COAGS:   Lab Results   Component Value Date    PTT 31 03/24/2024    INR 1.32 (H) 04/01/2024    FIBR 320 03/24/2024     POC: No results found for: \"BGM\", \"HCG\", \"HCGS\"  HEPATIC:   Lab Results   Component Value Date    ALBUMIN 4.4 04/01/2024    PROTTOTAL 7.9 04/01/2024     (H) 04/01/2024    AST 66 (H) 04/01/2024    ALKPHOS 79 04/01/2024    BILITOTAL 0.6 04/01/2024     OTHER:   Lab Results   Component Value Date    LACT 1.8 03/23/2024    A1C 5.3 03/28/2024    BUZZ 9.9 04/01/2024    PHOS 4.3 02/11/2024    MAG 2.2 02/11/2024    LIPASE 884 (H) 03/23/2024       Anesthesia Plan    ASA Status:  2       Anesthesia Type: General.     - Airway: ETT   Induction: Intravenous, Propofol.   Maintenance: Balanced.        Consents    Anesthesia Plan(s) and associated risks, benefits, and realistic alternatives discussed. Questions answered and patient/representative(s) expressed understanding.     - Discussed:     - Discussed with:  Patient      - Extended Intubation/Ventilatory Support Discussed: No.      - Patient is DNR/DNI Status: No     Use of blood products discussed: No .     Postoperative Care    Pain management: IV analgesics.   PONV prophylaxis: Dexamethasone or Solumedrol, Ondansetron (or other 5HT-3)     Comments:               Duane Montenegro MD    I have reviewed the pertinent notes and labs in the chart from the past 30 days and (re)examined the patient.  Any updates or changes from those notes are reflected in this note.       # " "Hypocalcemia: Lowest Ca = 8.4 mg/dL in last 30 days, will monitor and replace as appropriate      # Coagulation Defect: INR = 1.32 (Ref range: 0.85 - 1.15) and/or PTT = 31 Seconds (Ref range: 22 - 38 Seconds), will monitor for bleeding   # Overweight: Estimated body mass index is 25.72 kg/m  as calculated from the following:    Height as of this encounter: 1.702 m (5' 7\").    Weight as of this encounter: 74.5 kg (164 lb 3.9 oz).      "

## 2024-04-18 NOTE — TELEPHONE ENCOUNTER
Sarah Beth:    Please arrange for:  1) CT abdomen with IV contrast in 2 weeks.  2) Clinic visit with me in 2 months with labs: CMP, CRP CBC.  3) Fecal elastase testing in the next few weeks.    No need for further scheduled endoscopic procedures at this time.    ADARSH Gallardo MD  Professor of Medicine  Division of Gastroenterology, Hepatology and Nutrition  [facility]

## 2024-04-18 NOTE — ANESTHESIA PROCEDURE NOTES
Airway       Patient location during procedure: OR       Procedure Start/Stop Times: 4/18/2024 7:40 AM  Staff -        CRNA: Daren Childress APRN CRNA       Performed By: CRNA  Consent for Airway        Urgency: elective  Indications and Patient Condition       Indications for airway management: evette-procedural       Induction type:intravenous       Mask difficulty assessment: 1 - vent by mask    Final Airway Details       Final airway type: endotracheal airway       Successful airway: ETT - single  Endotracheal Airway Details        Successful intubation technique: direct laryngoscopy       DL Blade Type: MAC 3       Grade View of Cords: 1       Adjucts: stylet       Position: Right       Measured from: lips       Secured at (cm): 23    Post intubation assessment        Placement verified by: capnometry, equal breath sounds and chest rise        Number of attempts at approach: 1       Secured with: pink tape       Ease of procedure: easy       Dentition: Intact and Unchanged    Medication(s) Administered   Medication Administration Time: 4/18/2024 7:40 AM

## 2024-04-18 NOTE — BRIEF OP NOTE
[facility]    Brief Operative Note    Pre-operative diagnosis: Pancreas hemorrhage [K86.89]  Alcohol-induced acute pancreatitis without infection or necrosis [K85.20]  Post-operative diagnosis Same as pre-operative diagnosis    Procedure: ESOPHAGOGASTRODUODENOSCOPY,  WITH ENDOSCOPIC EXCISION OF NECROTIC PANCREATIC TISSUE and stent exchange, N/A - Esophagus    Surgeon: Surgeons and Role:     * Marc Gallardo MD - Primary  Anesthesia: General   Estimated Blood Loss: Minimal    Drains: None  Specimens: * No specimens in log *  Findings:   The existing Axios and Solus stents were occluded with solid necrosis as is expected.   Contrast was injected into the necrosis cavity to delineate the necrosis. Contrast did not fill the periduodenal collection.   Each Axios site was marked with a single hemoclip and all stents were removed.   The cavity was lavaged and suctioned. Debridement of solid necrosis was performed with snares.  All accessible necrosis was debrided.  There was a large caliber artery traversing the right portion of the cavity which limited debridement in this region.    Three 10F x 1 cm Solus stents were then placed across the proximal gastrostomy site. I did not place stents across the distal gastrostomy site due to the immediately adjacent artery.    Fluoroscopy showed no subdiaphragmatic gas.  Complications: None.  Implants:   Implant Name Type Inv. Item Serial No.  Lot No. LRB No. Used Action   STENT ESU AXIOS W/DEL SYS [id number] 10.8FR 138CM [id number] - [id number] Stent STENT ESU AXIOS W/DEL SYS [id number] 10.8FR 138CM [id number]  [employer] [id number] N/A 1 Explanted   STENT ESU AXIOS W/DEL SYS [id number] 10.8FR 138CM [id number] - [id number] Stent STENT ESU AXIOS W/DEL SYS [id number] 10.8FR 138CM [id number]  [employer] [id number] N/A 1 Explanted   STENT SOLUS BILIARY [id number] DBL PIGTAIL W/INTRO [id number] - [id number] Stent STENT SOLUS  BILIARY [id number] DBL PIGTAIL W/INTRO [id number]  [facility] [employer] [id number] N/A 1 Explanted   STENT SOLUS BILIARY [id number] DBL PIGTAIL W/INTRO [id number] - [id number] Stent STENT SOLUS BILIARY [id number] DBL PIGTAIL W/INTRO [id number]  [employer] [id number] N/A 1 Explanted   STENT SOLUS BILIARY [id number] DBL PIGTAIL W/INTRO [id number] - [id number] Stent STENT SOLUS BILIARY [id number] DBL PIGTAIL W/INTRO [id number]  [facility] [employer] [id number] N/A 1 Explanted   STENT SOLUS BILIARY [id number] DBL PIGTAIL W/INTRO [id number] - [id number] Stent STENT SOLUS BILIARY [id number] DBL PIGTAIL W/INTRO [id number]  [facility] [employer] [id number] N/A 1 Explanted   STENT SOLUS BILIARY [id number] DBL PIGTAIL W/INTRO [id number] - [id number] Stent STENT SOLUS BILIARY [id number] DBL PIGTAIL W/INTRO [id number]  [facility] [employer] [id number] N/A 1 Implanted   STENT SOLUS BILIARY [id number] DBL PIGTAIL W/INTRO Z98723 - [id number] Stent STENT SOLUS BILIARY [id number] DBL PIGTAIL W/INTRO [id number]  [employer] [id number] N/A 1 Implanted   STENT SOLUS BILIARY [id number] DBL PIGTAIL W/INTRO [id number] - [id number] Stent STENT SOLUS BILIARY [id number] DBL PIGTAIL W/INTRO J06124  [facility] [employer] [id number] N/A 1 Implanted     ADARSH Gallardo MD  Professor of Medicine  Division of Gastroenterology, Hepatology and Nutrition  [facility]

## 2024-04-18 NOTE — ANESTHESIA POSTPROCEDURE EVALUATION
Patient: Duane Ramos    Procedure: Procedure(s):  ESOPHAGOGASTRODUODENOSCOPY,  WITH ENDOSCOPIC EXCISION OF NECROTIC PANCREATIC TISSUE and stent exchange       Anesthesia Type:  General    Note:  Disposition: Outpatient   Postop Pain Control: Uneventful            Sign Out: Well controlled pain   PONV: No   Neuro/Psych: Uneventful            Sign Out: Acceptable/Baseline neuro status   Airway/Respiratory: Uneventful            Sign Out: Acceptable/Baseline resp. status   CV/Hemodynamics: Uneventful            Sign Out: Acceptable CV status; No obvious hypovolemia; No obvious fluid overload   Other NRE: NONE   DID A NON-ROUTINE EVENT OCCUR? No           Last vitals:  Vitals Value Taken Time   /81 04/18/24 0930   Temp 37.5  C (99.5  F) 04/18/24 0915   Pulse 86 04/18/24 0941   Resp 18 04/18/24 0941   SpO2 97 % 04/18/24 0941   Vitals shown include unfiled device data.    Electronically Signed By: Duke Frazier MD  April 18, 2024  9:41 AM

## 2024-04-18 NOTE — DISCHARGE INSTRUCTIONS
Contacting your Doctor -   To contact a doctor, call Dr Gallardo at the  GI clinic at [phone] or:  [phone] and ask for the resident on call for Gastroenterology (answered 24 hours a day)   Emergency Department:  [facility]: [phone]  [facility]: [phone] 911 if you are in need of immediate or emergent help

## 2024-04-18 NOTE — ANESTHESIA CARE TRANSFER NOTE
Patient: Duane Ramos    Procedure: Procedure(s):  ESOPHAGOGASTRODUODENOSCOPY,  WITH ENDOSCOPIC EXCISION OF NECROTIC PANCREATIC TISSUE and stent exchange       Diagnosis: Pancreas hemorrhage [K86.89]  Alcohol-induced acute pancreatitis without infection or necrosis [K85.20]  Diagnosis Additional Information: No value filed.    Anesthesia Type:   General     Note:    Oropharynx: oropharynx clear of all foreign objects and spontaneously breathing  Level of Consciousness: awake  Oxygen Supplementation: nasal cannula    Independent Airway: airway patency satisfactory and stable  Dentition: dentition unchanged  Vital Signs Stable: post-procedure vital signs reviewed and stable  Report to RN Given: handoff report given  Patient transferred to: PACU    Handoff Report: Identifed the Patient, Identified the Reponsible Provider, Reviewed the pertinent medical history, Discussed the surgical course, Reviewed Intra-OP anesthesia mangement and issues during anesthesia, Set expectations for post-procedure period and Allowed opportunity for questions and acknowledgement of understanding      Vitals:  Vitals Value Taken Time   /73 04/18/24 0915   Temp     Pulse 93 04/18/24 0916   Resp 21 04/18/24 0916   SpO2 100 % 04/18/24 0916   Vitals shown include unfiled device data.    Electronically Signed By: MOHSEN Mohan CRNA  April 18, 2024  9:17 AM

## 2024-04-23 ENCOUNTER — OFFICE VISIT (OUTPATIENT)
Dept: UROLOGY | Facility: CLINIC | Age: 29
End: 2024-04-23
Payer: COMMERCIAL

## 2024-04-23 VITALS
OXYGEN SATURATION: 98 % | HEIGHT: 67 IN | SYSTOLIC BLOOD PRESSURE: 121 MMHG | WEIGHT: 164 LBS | DIASTOLIC BLOOD PRESSURE: 76 MMHG | BODY MASS INDEX: 25.74 KG/M2 | TEMPERATURE: 97.6 F | HEART RATE: 67 BPM

## 2024-04-23 DIAGNOSIS — N20.0 NEPHROLITHIASIS: ICD-10-CM

## 2024-04-23 DIAGNOSIS — N20.0 CALCULUS OF KIDNEY: ICD-10-CM

## 2024-04-23 PROCEDURE — 99214 OFFICE O/P EST MOD 30 MIN: CPT | Performed by: STUDENT IN AN ORGANIZED HEALTH CARE EDUCATION/TRAINING PROGRAM

## 2024-04-23 RX ORDER — TAMSULOSIN HYDROCHLORIDE 0.4 MG/1
0.4 CAPSULE ORAL DAILY
Qty: 20 CAPSULE | Refills: 0 | Status: SHIPPED | OUTPATIENT
Start: 2024-04-23 | End: 2024-05-15

## 2024-04-23 ASSESSMENT — PAIN SCALES - GENERAL: PAINLEVEL: NO PAIN (0)

## 2024-04-23 NOTE — PROGRESS NOTES
"    UROLOGY FOLLOW-UP NOTE          Chief Complaint:   Today I had the pleasure of seeing Mr. Duane Ramos in follow-up for a chief complaint of kidney stones.          Interval Update   Duane Ramos is a very pleasant 28 year old male with a history of pancreatitis.     Brief History: Mr. Duane Ramos has been followed for a 5 mm right proximal ureteral stone. This was his first kidney stone. He was started on tamsulosin 0.4 mg and proceeded with medical expulsive therapy.     Today's notes: He is doing well today. He has not had any episodes of pain in the last three days. He denies dysuria, urinary frequency and urgency, and gross hematuria. He has not been straining his urine, but has been collecting it in a large jug and has not seen the stone pass.          Physical Exam:   Patient is a 28 year old  male   Vitals: Blood pressure 121/76, pulse 67, temperature 97.6  F (36.4  C), temperature source Tympanic, height 1.702 m (5' 7\"), weight 74.4 kg (164 lb), SpO2 98%.  General: Alert and oriented x 3, no acute distress.  Respiratory: Non-labored breathing.  Cardiac: Regular rate.      Labs and Pathology:    I personally reviewed all applicable laboratory data and went over findings with patient  Significant for:    CBC RESULTS:  Recent Labs   Lab Test 04/11/24  0913 04/01/24  1310 03/28/24  0800 03/27/24  1929 03/26/24  1258 03/26/24  0536   WBC 5.2 9.9 5.1  --   --  4.8   HGB 10.2* 11.2* 9.7* 9.4*   < > 7.8*    386 235  --   --  167    < > = values in this interval not displayed.        BMP RESULTS:  Recent Labs   Lab Test 04/01/24  1310 03/28/24  0800 03/27/24  0756 03/26/24  1557 03/26/24  0536 03/25/24  0539 03/24/24  0528    139  --   --  139  --  139   POTASSIUM 4.2 3.7 3.7 3.9 3.4   < > 4.1   CHLORIDE 99 103  --   --  107  --  103   CO2 23 23  --   --  22  --  22   ANIONGAP 15 13  --   --  10  --  14   * 103*  --   --  83  --  126*   BUN 12.6 6.5  --   --  4.4*  --  14.9   CR 1.12 " 0.74  --   --  0.75  --  0.88   GFRESTIMATED >90 >90  --   --  >90  --  >90   BUZZ 9.9 9.4  --   --  8.4*  --  8.7    < > = values in this interval not displayed.       UA RESULTS:   Recent Labs   Lab Test 04/05/24  1301 02/11/24 2056   SG >=1.030 1.015   URINEPH 6.0 5.0   NITRITE Negative Negative   RBCU 25-50*  --    WBCU 0-5  --             Assessment/Plan   28 year old male seen in follow up for a 5 mm right proximal ureteral stone. This was his first kidney stone. He was started on tamsulosin 0.4 mg and proceeded with medical expulsive therapy.     CT abdomen pelvis from 4/17/2024 showed progression of the stone, now in the distal right ureter. He has not had any episodes of pain in the last three days. He denies dysuria, urinary frequency and urgency, and gross hematuria. He has not been straining his urine, but has been collecting it in a large jug and has not seen the stone pass.     Since he is essentially asymptomatic, we will continue with a trial of passage with planned imaging in 2-3 weeks. The patient is in agreement with the plan.     Plan:  CT pelvis without contrast in 2-3  weeks to evaluate for stone passage.   Continue tamsulosin 0.4 mg daily.  Follow up pending results.            Past Medical History:   No past medical history on file.         Past Surgical History:     Past Surgical History:   Procedure Laterality Date    ENDOSCOPIC ULTRASOUND, ESOPHAGOSCOPY, GASTROSCOPY, DUODENOSCOPY (EGD), NECROSECTOMY N/A 4/11/2024    Procedure: ESOPHAGOGASTRODUODENOSCOPY, WITH ENDOSCOPIC ULTRASOUND, WITH ENDOSCOPIC Cystogastrostomy and with stent placement..;  Surgeon: Marc Gallardo MD;  Location: [facility] OR    ENDOSCOPIC ULTRASOUND, ESOPHAGOSCOPY, GASTROSCOPY, DUODENOSCOPY (EGD), NECROSECTOMY N/A 4/18/2024    Procedure: ESOPHAGOGASTRODUODENOSCOPY,  WITH ENDOSCOPIC EXCISION OF NECROTIC PANCREATIC TISSUE and stent exchange;  Surgeon: Marc Gallardo MD;  Location: [facility] OR    IR VISCERAL ANGIOGRAM   3/24/2024    OTHER SURGICAL HISTORY Right 2008    broken clavicle    IA LAP,APPENDECTOMY N/A 11/18/2017    Procedure: APPENDECTOMY, LAPAROSCOPIC;  Surgeon: Milton Francois MD;  [facility];  Service: General            Medications     Current Outpatient Medications   Medication Sig Dispense Refill    tamsulosin (FLOMAX) 0.4 MG capsule Take 1 capsule (0.4 mg) by mouth daily 20 capsule 0    acetaminophen (TYLENOL) 325 MG tablet Take 2 tablets (650 mg) by mouth every 8 hours as needed for mild pain or other (and adjunct with moderate or severe pain or per patient request)      amoxicillin-clavulanate (AUGMENTIN) 875-125 MG tablet Take 1 tablet by mouth 2 times daily 28 tablet 0    oxyCODONE (ROXICODONE) 5 MG tablet Take 5 mg by mouth every 6 hours as needed for severe pain       No current facility-administered medications for this visit.            Family History:   No family history on file.         Social History:     Social History     Socioeconomic History    Marital status: Single     Spouse name: Not on file    Number of children: Not on file    Years of education: Not on file    Highest education level: Not on file   Occupational History    Not on file   Tobacco Use    Smoking status: Former     Types: Cigarettes    Smokeless tobacco: Former    Tobacco comments:     Quit chewing tobacco a year ago   Vaping Use    Vaping status: Never Used   Substance and Sexual Activity    Alcohol use: Not Currently    Drug use: Yes     Comment: THC drink    Sexual activity: Not on file   Other Topics Concern    Not on file   Social History Narrative    Works as  at [employer].      Social Determinants of Health     Financial Resource Strain: Not on file   Food Insecurity: Not on file   Transportation Needs: Not on file   Physical Activity: Not on file   Stress: Not on file   Social Connections: Not on file   Interpersonal Safety: Low Risk  (2/29/2024)    Interpersonal Safety     Do you feel physically  and emotionally safe where you currently live?: Yes     Within the past 12 months, have you been hit, slapped, kicked or otherwise physically hurt by someone?: No     Within the past 12 months, have you been humiliated or emotionally abused in other ways by your partner or ex-partner?: No   Housing Stability: Not on file            Allergies:   Patient has no known allergies.         Review of Systems:  From intake questionnaire   Negative 14 system review except as noted on HPI, nurse's note.        ANNABELLA PACHECO PA-C  Department of Urology

## 2024-05-09 ENCOUNTER — HOSPITAL ENCOUNTER (OUTPATIENT)
Dept: CT IMAGING | Facility: CLINIC | Age: 29
Discharge: HOME | End: 2024-05-09
Attending: STUDENT IN AN ORGANIZED HEALTH CARE EDUCATION/TRAINING PROGRAM | Admitting: STUDENT IN AN ORGANIZED HEALTH CARE EDUCATION/TRAINING PROGRAM
Payer: COMMERCIAL

## 2024-05-09 DIAGNOSIS — N20.0 CALCULUS OF KIDNEY: ICD-10-CM

## 2024-05-09 PROCEDURE — 72192 CT PELVIS W/O DYE: CPT

## 2024-05-10 DIAGNOSIS — K85.91 NECROTIZING PANCREATITIS: Primary | ICD-10-CM

## 2024-05-15 ENCOUNTER — TELEPHONE (OUTPATIENT)
Dept: SURGERY | Facility: CLINIC | Age: 29
End: 2024-05-15
Payer: COMMERCIAL

## 2024-05-15 ENCOUNTER — HOSPITAL ENCOUNTER (OUTPATIENT)
Facility: CLINIC | Age: 29
End: 2024-05-15
Attending: STUDENT IN AN ORGANIZED HEALTH CARE EDUCATION/TRAINING PROGRAM | Admitting: STUDENT IN AN ORGANIZED HEALTH CARE EDUCATION/TRAINING PROGRAM
Payer: COMMERCIAL

## 2024-05-15 DIAGNOSIS — N20.0 NEPHROLITHIASIS: Primary | ICD-10-CM

## 2024-05-15 DIAGNOSIS — N39.0 URINARY TRACT INFECTION: Primary | ICD-10-CM

## 2024-05-15 RX ORDER — TAMSULOSIN HYDROCHLORIDE 0.4 MG/1
0.4 CAPSULE ORAL DAILY
Qty: 30 CAPSULE | Refills: 0 | Status: SHIPPED | OUTPATIENT
Start: 2024-05-15 | End: 2024-05-17

## 2024-05-16 ENCOUNTER — PATIENT OUTREACH (OUTPATIENT)
Dept: GASTROENTEROLOGY | Facility: CLINIC | Age: 29
End: 2024-05-16
Payer: COMMERCIAL

## 2024-05-16 NOTE — PROGRESS NOTES
Attempted to reach patient to discuss symptoms reported via Fareye message. Unable to reach at this time. LVM with clinic contact information.    Radha Lobo RN Care Coordinator

## 2024-05-17 ENCOUNTER — OFFICE VISIT (OUTPATIENT)
Dept: FAMILY MEDICINE | Facility: CLINIC | Age: 29
End: 2024-05-17
Payer: COMMERCIAL

## 2024-05-17 ENCOUNTER — HOSPITAL ENCOUNTER (OUTPATIENT)
Dept: CT IMAGING | Facility: CLINIC | Age: 29
Discharge: HOME | End: 2024-05-17
Attending: NURSE PRACTITIONER | Admitting: NURSE PRACTITIONER
Payer: COMMERCIAL

## 2024-05-17 DIAGNOSIS — Z87.19 HISTORY OF PANCREATITIS: ICD-10-CM

## 2024-05-17 DIAGNOSIS — Z87.442 HISTORY OF KIDNEY STONES: ICD-10-CM

## 2024-05-17 DIAGNOSIS — N13.30 HYDRONEPHROSIS, UNSPECIFIED HYDRONEPHROSIS TYPE: ICD-10-CM

## 2024-05-17 DIAGNOSIS — R10.11 ABDOMINAL PAIN, RIGHT UPPER QUADRANT: ICD-10-CM

## 2024-05-17 DIAGNOSIS — R10.11 ABDOMINAL PAIN, RIGHT UPPER QUADRANT: Primary | ICD-10-CM

## 2024-05-17 LAB
ALBUMIN SERPL BCG-MCNC: 4.3 G/DL (ref 3.5–5.2)
ALBUMIN UR-MCNC: NEGATIVE MG/DL
ALP SERPL-CCNC: 71 U/L (ref 40–150)
ALT SERPL W P-5'-P-CCNC: 18 U/L (ref 0–70)
ANION GAP SERPL CALCULATED.3IONS-SCNC: 12 MMOL/L (ref 7–15)
APPEARANCE UR: CLEAR
AST SERPL W P-5'-P-CCNC: 16 U/L (ref 0–45)
BACTERIA #/AREA URNS HPF: ABNORMAL /HPF
BASOPHILS # BLD AUTO: 0 10E3/UL (ref 0–0.2)
BASOPHILS NFR BLD AUTO: 0 %
BILIRUB SERPL-MCNC: 0.6 MG/DL
BILIRUB UR QL STRIP: NEGATIVE
BUN SERPL-MCNC: 15.5 MG/DL (ref 6–20)
CALCIUM SERPL-MCNC: 9.9 MG/DL (ref 8.6–10)
CHLORIDE SERPL-SCNC: 103 MMOL/L (ref 98–107)
COLOR UR AUTO: YELLOW
CREAT SERPL-MCNC: 1.08 MG/DL (ref 0.67–1.17)
DEPRECATED HCO3 PLAS-SCNC: 25 MMOL/L (ref 22–29)
EGFRCR SERPLBLD CKD-EPI 2021: >90 ML/MIN/1.73M2
EOSINOPHIL # BLD AUTO: 0.1 10E3/UL (ref 0–0.7)
EOSINOPHIL NFR BLD AUTO: 1 %
ERYTHROCYTE [DISTWIDTH] IN BLOOD BY AUTOMATED COUNT: 14.1 % (ref 10–15)
GLUCOSE SERPL-MCNC: 103 MG/DL (ref 70–99)
GLUCOSE UR STRIP-MCNC: NEGATIVE MG/DL
HCT VFR BLD AUTO: 36.3 % (ref 40–53)
HGB BLD-MCNC: 11.4 G/DL (ref 13.3–17.7)
HGB UR QL STRIP: ABNORMAL
IMM GRANULOCYTES # BLD: 0 10E3/UL
IMM GRANULOCYTES NFR BLD: 0 %
KETONES UR STRIP-MCNC: NEGATIVE MG/DL
LEUKOCYTE ESTERASE UR QL STRIP: NEGATIVE
LIPASE SERPL-CCNC: 16 U/L (ref 13–60)
LYMPHOCYTES # BLD AUTO: 1.9 10E3/UL (ref 0.8–5.3)
LYMPHOCYTES NFR BLD AUTO: 17 %
MCH RBC QN AUTO: 25.8 PG (ref 26.5–33)
MCHC RBC AUTO-ENTMCNC: 31.4 G/DL (ref 31.5–36.5)
MCV RBC AUTO: 82 FL (ref 78–100)
MONOCYTES # BLD AUTO: 1 10E3/UL (ref 0–1.3)
MONOCYTES NFR BLD AUTO: 9 %
MUCOUS THREADS #/AREA URNS LPF: PRESENT /LPF
NEUTROPHILS # BLD AUTO: 8.1 10E3/UL (ref 1.6–8.3)
NEUTROPHILS NFR BLD AUTO: 73 %
NITRATE UR QL: NEGATIVE
PH UR STRIP: 6 [PH] (ref 5–7)
PLATELET # BLD AUTO: 253 10E3/UL (ref 150–450)
POTASSIUM SERPL-SCNC: 4 MMOL/L (ref 3.4–5.3)
PROT SERPL-MCNC: 7.4 G/DL (ref 6.4–8.3)
RBC # BLD AUTO: 4.42 10E6/UL (ref 4.4–5.9)
RBC #/AREA URNS AUTO: ABNORMAL /HPF
SODIUM SERPL-SCNC: 140 MMOL/L (ref 135–145)
SP GR UR STRIP: 1.01 (ref 1–1.03)
SQUAMOUS #/AREA URNS AUTO: ABNORMAL /LPF
UROBILINOGEN UR STRIP-ACNC: 0.2 E.U./DL
WBC # BLD AUTO: 11 10E3/UL (ref 4–11)
WBC #/AREA URNS AUTO: ABNORMAL /HPF

## 2024-05-17 PROCEDURE — 85025 COMPLETE CBC W/AUTO DIFF WBC: CPT | Performed by: NURSE PRACTITIONER

## 2024-05-17 PROCEDURE — 80053 COMPREHEN METABOLIC PANEL: CPT | Performed by: NURSE PRACTITIONER

## 2024-05-17 PROCEDURE — 83690 ASSAY OF LIPASE: CPT | Performed by: NURSE PRACTITIONER

## 2024-05-17 PROCEDURE — 36415 COLL VENOUS BLD VENIPUNCTURE: CPT | Performed by: NURSE PRACTITIONER

## 2024-05-17 PROCEDURE — 250N000009 HC RX 250: Performed by: NURSE PRACTITIONER

## 2024-05-17 PROCEDURE — 99214 OFFICE O/P EST MOD 30 MIN: CPT | Performed by: NURSE PRACTITIONER

## 2024-05-17 PROCEDURE — 81001 URINALYSIS AUTO W/SCOPE: CPT | Performed by: NURSE PRACTITIONER

## 2024-05-17 PROCEDURE — 250N000011 HC RX IP 250 OP 636: Performed by: NURSE PRACTITIONER

## 2024-05-17 PROCEDURE — 74177 CT ABD & PELVIS W/CONTRAST: CPT

## 2024-05-17 RX ORDER — IOPAMIDOL 755 MG/ML
82 INJECTION, SOLUTION INTRAVASCULAR ONCE
Status: COMPLETED | OUTPATIENT
Start: 2024-05-17 | End: 2024-05-17

## 2024-05-17 RX ORDER — OXYCODONE HYDROCHLORIDE 5 MG/1
5 TABLET ORAL EVERY 6 HOURS PRN
Qty: 12 TABLET | Refills: 0 | Status: SHIPPED | OUTPATIENT
Start: 2024-05-17

## 2024-05-17 RX ADMIN — SODIUM CHLORIDE 61 ML: 9 INJECTION, SOLUTION INTRAVENOUS at 12:04

## 2024-05-17 RX ADMIN — IOPAMIDOL 82 ML: 755 INJECTION, SOLUTION INTRAVENOUS at 12:03

## 2024-05-17 ASSESSMENT — PAIN SCALES - GENERAL: PAINLEVEL: SEVERE PAIN (6)

## 2024-05-17 NOTE — PROGRESS NOTES
Assessment & Plan     Abdominal pain, right upper quadrant  -discussed CT scan results, patient is scheduled with his GI provider in 1 week  -I send message to his urologist regarding mild hydronephrosis, his scheduled time for lithotripsy is still appropriate, if patient will continue to have more pain, they will try to move scheduled procedure for sooner time   - CBC with platelets and differential; Future  - Comprehensive metabolic panel (BMP + Alb, Alk Phos, ALT, AST, Total. Bili, TP); Future  - Lipase; Future  - CT Abdomen Pelvis w Contrast; Future  - CBC with platelets and differential  - Comprehensive metabolic panel (BMP + Alb, Alk Phos, ALT, AST, Total. Bili, TP)  - Lipase  - oxyCODONE (ROXICODONE) 5 MG tablet; Take 1 tablet (5 mg) by mouth every 6 hours as needed for pain    History of kidney stones    - UA with Microscopic reflex to Culture - lab collect; Future  - Comprehensive metabolic panel (BMP + Alb, Alk Phos, ALT, AST, Total. Bili, TP); Future  - CT Abdomen Pelvis w Contrast; Future  - UA with Microscopic reflex to Culture - lab collect  - Comprehensive metabolic panel (BMP + Alb, Alk Phos, ALT, AST, Total. Bili, TP)  - UA Microscopic with Reflex to Culture  - oxyCODONE (ROXICODONE) 5 MG tablet; Take 1 tablet (5 mg) by mouth every 6 hours as needed for pain  Addendum 5/22:  -patient contacted urologist on 5/21-he passed kidney stone, his procedure will be canceled     History of pancreatitis  -Lipase level normal  - Comprehensive metabolic panel (BMP + Alb, Alk Phos, ALT, AST, Total. Bili, TP); Future  - Lipase; Future  - CT Abdomen Pelvis w Contrast; Future  - Comprehensive metabolic panel (BMP + Alb, Alk Phos, ALT, AST, Total. Bili, TP)  - Lipase    Hydronephrosis, unspecified hydronephrosis type  -scheduled for lithotripsy on 5/29 th  -I informed his urologist about CT findings      Blaire Zepeda is a 28 year old, presenting for the following health issues:  Abdominal Pain (Mid upper and  right flank - has past of Pancreatitis  - also passing a kidney stone currently - has a procedure next week for this if it does not pass out on its own )    History of Present Illness       Reason for visit:  Stomach pain  Symptom onset:  1-3 days ago    He eats 0-1 servings of fruits and vegetables daily.He consumes 3 sweetened beverage(s) daily.He exercises with enough effort to increase his heart rate 30 to 60 minutes per day.  He exercises with enough effort to increase his heart rate 5 days per week.   He is taking medications regularly.       Chief Complaint   Patient presents with    Abdominal Pain     Mid upper and right flank - has past of Pancreatitis  - also passing a kidney stone currently - has a procedure next week for this if it does not pass out on its own        Pain History:  When did you first notice your pain? 2 days ago    Have you seen anyone else for your pain? No  How has your pain affected your ability to work? Can not work due to pain  Where in your body do you have pain? Abdominal/Flank Pain  Onset/Duration: x 2 days   Description:   Character: Sharp and Stabbing  Location: mid upper and Right Flank   Radiation: None  Intensity: moderate  Progression of Symptoms:  worsening  Accompanying Signs & Symptoms:  Fever/Chills: No  Gas/Bloating: No  Nausea: No  Vomitting: YES - once   Diarrhea: No  Constipation: No  Dysuria or Hematuria: YES- pain with urination - currently passing a kidney stone   History:   Trauma: No  Previous similar pain: YES- with pancreatitis and kidney stone   Previous tests done: CT  Precipitating factors:   Does the pain change with:     Food: No    Bowel Movement: No    Urination: No   Other factors:  has recent history of kidney stone and pancreatitis   Therapies tried and outcome: tylenol - short term relief        Review of Systems  Constitutional, neuro, ENT, endocrine, pulmonary, cardiac, gastrointestinal, genitourinary, musculoskeletal, integument and psychiatric  "systems are negative, except as otherwise noted.      Objective    BP (!) 122/100 (BP Location: Left arm, Patient Position: Sitting, Cuff Size: Adult Regular)   Pulse 62   Temp 98.3  F (36.8  C) (Tympanic)   Resp 20   Ht 1.695 m (5' 6.75\")   Wt 76.7 kg (169 lb)   SpO2 99%   BMI 26.67 kg/m    Body mass index is 26.67 kg/m .  Physical Exam   GENERAL: alert and no distress  EYES: Eyes grossly normal to inspection, PERRL and conjunctivae and sclerae normal  CV: regular rate and rhythm, normal S1 S2, no S3 or S4, no murmur, click or rub, no peripheral edema   ABDOMEN: tenderness right middle abdomen and liver span normal to percussion  SKIN: no suspicious lesions or rashes  NEURO: Normal strength and tone, mentation intact and speech normal  PSYCH: mentation appears normal, affect normal/bright    Results for orders placed or performed during the hospital encounter of 05/17/24   CT Abdomen Pelvis w Contrast     Status: None    Narrative    CT ABDOMEN/PELVIS WITH CONTRAST May 17, 2024 12:13 PM    CLINICAL HISTORY: Abdominal pain, right upper quadrant. History of  kidney stones. History of pancreatitis.    TECHNIQUE: CT scan of the abdomen and pelvis was performed following  injection of IV contrast. Multiplanar reformats were obtained. Dose  reduction techniques were used.  CONTRAST: 82mL Isovue-370.    COMPARISON: 5/9/2024, 4/1/2024.    FINDINGS:   LOWER CHEST: Normal.    HEPATOBILIARY: No signs of cholelithiasis, acute cholecystitis, or  bile duct dilation. No suspicious appearing hepatic observations.    PANCREAS: Two adjacent cystogastrostomy tubes present with one end of  the loops positioned between the pancreatic body and tail and lesser  curvature of the stomach and the other end located within the gastric  body. There has been interval resolution of the previously seen large  lobulated hemorrhagic pseudocyst measuring up to 16 cm in the left  upper quadrant. No residual organized fluid collection is " associated  with these tubes. A separate peripherally enhancing low-density fluid  collection is seen inferior to the head of the pancreas measuring 7.1  x 4 x 7.6 cm (5-103-36), which has increased in size to comparison,  previously measuring up to 4.5 cm. There is heterogenous enhancement  seen throughout the pancreatic parenchyma with mild fat stranding in  the upper abdomen adjacent to the pancreas, first portions of the  duodenum, and right retroperitoneum.    SPLEEN: Spleen is enlarged at 16.2 cm in cranial caudal dimension. No  splenic masses or signs of splenic infarct.    ADRENAL GLANDS: Normal.    KIDNEYS/BLADDER: A 5 x 4 mm calculus is seen at the right  ureterovesicular junction, which likely corresponds to a previously  seen proximal right ureteral calculus. As a result, there is mild  right-sided hydroureteronephrosis present, which is mildly increased  T2 comparison. A couple punctate 2-3 mm nonobstructing right  intrarenal calculi are present. No nephrolithiasis or hydronephrosis  on the left. No cystic or solid renal mass. Mild delayed nephrogram on  the right. No urinary bladder wall thickening.    BOWEL: Normal.    PELVIC ORGANS: Normal.    ADDITIONAL FINDINGS: Trace free pelvic fluid, likely reactive.    MUSCULOSKELETAL: Normal.      Impression    IMPRESSION:   1.  5 x 4 mm right ureterovesicular junction calculus resulting in  mild right-sided hydroureteronephrosis and delayed nephrogram.  2.  Inflammatory changes in the upper abdomen centered about the  pancreas and right retroperitoneum, consistent with sequela of chronic  pancreatitis. Acute on chronic pancreatitis is difficult to entirely  exclude.  3.  Mild interval enlargement of previously seen pseudocyst inferior  to the head of the pancreas measuring up to 7.6 cm, previously 4.5 cm.  4.  Interval resolution of previously seen dominant large pseudocyst  in the left upper quadrant.  5.  Two adjacent cystogastrostomy tubes in the left  upper quadrant. No  residual fluid collection is associated with these tubes.  6.  Splenomegaly.  7.  Nonacute findings as above.    Results discussed with the ordering provider, Jody Fischer CNP, at  1:00 PM on 5/17/2024.    COLETTE ARAIZA MD         SYSTEM ID:  [id number]   Results for orders placed or performed in visit on 05/17/24   UA with Microscopic reflex to Culture - lab collect     Status: Abnormal    Specimen: Urine, Midstream   Result Value Ref Range    Color Urine Yellow Colorless, Straw, Light Yellow, Yellow    Appearance Urine Clear Clear    Glucose Urine Negative Negative mg/dL    Bilirubin Urine Negative Negative    Ketones Urine Negative Negative mg/dL    Specific Gravity Urine 1.010 1.003 - 1.035    Blood Urine Moderate (A) Negative    pH Urine 6.0 5.0 - 7.0    Protein Albumin Urine Negative Negative mg/dL    Urobilinogen Urine 0.2 0.2, 1.0 E.U./dL    Nitrite Urine Negative Negative    Leukocyte Esterase Urine Negative Negative   Comprehensive metabolic panel (BMP + Alb, Alk Phos, ALT, AST, Total. Bili, TP)     Status: Abnormal   Result Value Ref Range    Sodium 140 135 - 145 mmol/L    Potassium 4.0 3.4 - 5.3 mmol/L    Carbon Dioxide (CO2) 25 22 - 29 mmol/L    Anion Gap 12 7 - 15 mmol/L    Urea Nitrogen 15.5 6.0 - 20.0 mg/dL    Creatinine 1.08 0.67 - 1.17 mg/dL    GFR Estimate >90 >60 mL/min/1.73m2    Calcium 9.9 8.6 - 10.0 mg/dL    Chloride 103 98 - 107 mmol/L    Glucose 103 (H) 70 - 99 mg/dL    Alkaline Phosphatase 71 40 - 150 U/L    AST 16 0 - 45 U/L    ALT 18 0 - 70 U/L    Protein Total 7.4 6.4 - 8.3 g/dL    Albumin 4.3 3.5 - 5.2 g/dL    Bilirubin Total 0.6 <=1.2 mg/dL   Lipase     Status: Normal   Result Value Ref Range    Lipase 16 13 - 60 U/L   CBC with platelets and differential     Status: Abnormal   Result Value Ref Range    WBC Count 11.0 4.0 - 11.0 10e3/uL    RBC Count 4.42 4.40 - 5.90 10e6/uL    Hemoglobin 11.4 (L) 13.3 - 17.7 g/dL    Hematocrit 36.3 (L) 40.0 - 53.0 %    MCV 82 78  - 100 fL    MCH 25.8 (L) 26.5 - 33.0 pg    MCHC 31.4 (L) 31.5 - 36.5 g/dL    RDW 14.1 10.0 - 15.0 %    Platelet Count 253 150 - 450 10e3/uL    % Neutrophils 73 %    % Lymphocytes 17 %    % Monocytes 9 %    % Eosinophils 1 %    % Basophils 0 %    % Immature Granulocytes 0 %    Absolute Neutrophils 8.1 1.6 - 8.3 10e3/uL    Absolute Lymphocytes 1.9 0.8 - 5.3 10e3/uL    Absolute Monocytes 1.0 0.0 - 1.3 10e3/uL    Absolute Eosinophils 0.1 0.0 - 0.7 10e3/uL    Absolute Basophils 0.0 0.0 - 0.2 10e3/uL    Absolute Immature Granulocytes 0.0 <=0.4 10e3/uL   UA Microscopic with Reflex to Culture     Status: Abnormal   Result Value Ref Range    Bacteria Urine Few (A) None Seen /HPF    RBC Urine 5-10 (A) 0-2 /HPF /HPF    WBC Urine 0-5 0-5 /HPF /HPF    Squamous Epithelials Urine Few (A) None Seen /LPF    Mucus Urine Present (A) None Seen /LPF    Narrative    Urine Culture not indicated   CBC with platelets and differential     Status: Abnormal    Narrative    The following orders were created for panel order CBC with platelets and differential.  Procedure                               Abnormality         Status                     ---------                               -----------         ------                     CBC with platelets and d...[[id number]]  Abnormal            Final result                 Please view results for these tests on the individual orders.     CT Abdomen Pelvis w Contrast    Result Date: 5/17/2024  CT ABDOMEN/PELVIS WITH CONTRAST May 17, 2024 12:13 PM CLINICAL HISTORY: Abdominal pain, right upper quadrant. History of kidney stones. History of pancreatitis. TECHNIQUE: CT scan of the abdomen and pelvis was performed following injection of IV contrast. Multiplanar reformats were obtained. Dose reduction techniques were used. CONTRAST: 82mL Isovue-370. COMPARISON: 5/9/2024, 4/1/2024. FINDINGS: LOWER CHEST: Normal. HEPATOBILIARY: No signs of cholelithiasis, acute cholecystitis, or bile duct dilation. No  suspicious appearing hepatic observations. PANCREAS: Two adjacent cystogastrostomy tubes present with one end of the loops positioned between the pancreatic body and tail and lesser curvature of the stomach and the other end located within the gastric body. There has been interval resolution of the previously seen large lobulated hemorrhagic pseudocyst measuring up to 16 cm in the left upper quadrant. No residual organized fluid collection is associated with these tubes. A separate peripherally enhancing low-density fluid collection is seen inferior to the head of the pancreas measuring 7.1 x 4 x 7.6 cm (5-103-36), which has increased in size to comparison, previously measuring up to 4.5 cm. There is heterogenous enhancement seen throughout the pancreatic parenchyma with mild fat stranding in the upper abdomen adjacent to the pancreas, first portions of the duodenum, and right retroperitoneum. SPLEEN: Spleen is enlarged at 16.2 cm in cranial caudal dimension. No splenic masses or signs of splenic infarct. ADRENAL GLANDS: Normal. KIDNEYS/BLADDER: A 5 x 4 mm calculus is seen at the right ureterovesicular junction, which likely corresponds to a previously seen proximal right ureteral calculus. As a result, there is mild right-sided hydroureteronephrosis present, which is mildly increased T2 comparison. A couple punctate 2-3 mm nonobstructing right intrarenal calculi are present. No nephrolithiasis or hydronephrosis on the left. No cystic or solid renal mass. Mild delayed nephrogram on the right. No urinary bladder wall thickening. BOWEL: Normal. PELVIC ORGANS: Normal. ADDITIONAL FINDINGS: Trace free pelvic fluid, likely reactive. MUSCULOSKELETAL: Normal.     IMPRESSION: 1.  5 x 4 mm right ureterovesicular junction calculus resulting in mild right-sided hydroureteronephrosis and delayed nephrogram. 2.  Inflammatory changes in the upper abdomen centered about the pancreas and right retroperitoneum, consistent with  sequela of chronic pancreatitis. Acute on chronic pancreatitis is difficult to entirely exclude. 3.  Mild interval enlargement of previously seen pseudocyst inferior to the head of the pancreas measuring up to 7.6 cm, previously 4.5 cm. 4.  Interval resolution of previously seen dominant large pseudocyst in the left upper quadrant. 5.  Two adjacent cystogastrostomy tubes in the left upper quadrant. No residual fluid collection is associated with these tubes. 6.  Splenomegaly. 7.  Nonacute findings as above. Results discussed with the ordering provider, Jody Fischer CNP, at 1:00 PM on 5/17/2024. COLETTE ARAIZA MD   SYSTEM ID:  [id number]         Signed Electronically by: MOHSEN Andre CNP

## 2024-05-17 NOTE — PATIENT INSTRUCTIONS
Lipase level normal, you do not have pancreatitis  Urine negative for urinary infection, you have some blood in urine due to kidney stone    Your liver and kidney function normal  Hemoglobin level improved, but still slightly low, this is due to recent bleeding    Per radiologist you have mild hydronephrosis in your right kidney    I will contact your urologist to see if you should have procedure done sooner than its scheduled now

## 2024-05-21 ENCOUNTER — TELEPHONE (OUTPATIENT)
Dept: SURGERY | Facility: CLINIC | Age: 29
End: 2024-05-21
Payer: COMMERCIAL

## 2024-05-21 VITALS
HEART RATE: 62 BPM | OXYGEN SATURATION: 99 % | RESPIRATION RATE: 20 BRPM | WEIGHT: 169 LBS | TEMPERATURE: 98.3 F | DIASTOLIC BLOOD PRESSURE: 88 MMHG | SYSTOLIC BLOOD PRESSURE: 122 MMHG | BODY MASS INDEX: 26.53 KG/M2 | HEIGHT: 67 IN

## 2024-05-21 DIAGNOSIS — N20.0 NEPHROLITHIASIS: Primary | ICD-10-CM

## 2024-05-21 NOTE — TELEPHONE ENCOUNTER
Spoke to patient on phone  He passed stone and caught it  Will cancel surgery  Ordered stone analysis to analyze it    Alexandra Bowers MD

## 2024-06-06 ENCOUNTER — TELEPHONE (OUTPATIENT)
Dept: GASTROENTEROLOGY | Facility: CLINIC | Age: 29
End: 2024-06-06
Payer: COMMERCIAL

## 2024-06-06 NOTE — TELEPHONE ENCOUNTER
Left Voicemail (1st Attempt) for the patient to call back and schedule the following:    Appointment type: CT abdomen  Provider: Imaging  Return date: prior to 6/24  Specialty phone number: [phone] and   Additional appointment(s) needed:   Additonal Notes:         *Hi team,     This patient was supposed to have a CT abd on 6/2 ordered by Dr Gallardo and cancelled for a reason I am not sure of.     Please call him to reschedule this and also he will need labs drawn prior to Dr Gallardo's clinic on 6/24. These labs were ordered on 5/10. Just have him get these labs done at the same time as the CT scan. He needs to submit a fecal elastase (stool test) as well. Ask him to do this before the clinic appt so that we can discuss results and address if anything is needed.     These can all be scheduled close to his home in [place], [place]     Thanks   Sarah Beth

## 2024-06-10 ENCOUNTER — TELEPHONE (OUTPATIENT)
Dept: GASTROENTEROLOGY | Facility: CLINIC | Age: 29
End: 2024-06-10
Payer: COMMERCIAL

## 2024-06-10 NOTE — TELEPHONE ENCOUNTER
Contacted pt when connecting to imaging call was disconnected and called back no answer sending [id number]/10

## 2024-06-14 ENCOUNTER — PATIENT OUTREACH (OUTPATIENT)
Dept: GASTROENTEROLOGY | Facility: CLINIC | Age: 29
End: 2024-06-14
Payer: COMMERCIAL

## 2024-06-14 NOTE — TELEPHONE ENCOUNTER
Called pt to discuss upcoming virtual visit with Dr Gallardo on 6/24. Pt did complete CT abd/pelvis with contrast on 5/17, ordered with indication of abdominal pain right upper quadrant. History of kidney stones. History of pancreatitis . Ordered by urology.                                                         IMPRESSION:   1.  5 x 4 mm right ureterovesicular junction calculus resulting in   mild right-sided hydroureteronephrosis and delayed nephrogram.   2.  Inflammatory changes in the upper abdomen centered about the   pancreas and right retroperitoneum, consistent with sequela of chronic   pancreatitis. Acute on chronic pancreatitis is difficult to entirely   exclude.   3. Mild interval enlargement of previously seen pseudocyst inferior   to the head of the pancreas measuring up to 7.6 cm, previously 4.5 cm.   4.  Interval resolution of previously seen dominant large pseudocyst   in the left upper quadrant.   5.  Two adjacent cystogastrostomy tubes in the left upper quadrant. No   residual fluid collection is associated with these tubes.   6.  Splenomegaly.   7.  Nonacute findings as above     Also completed labs on this date, CMP and CBC. The CRP was not done , nor has he submitted the fecal elastase.   Note from urology states he passed the kidney stone a few days after this CT was done.     Reiterated the need for fecal elastase test and the need for the missing lab. Pt will try to complete these early next week and is aware of timing for virtual visit. Said since passing the stone he has had no further abd pain.     Indu Smith, RN, BSN,   [facility]  Care coordinator

## 2024-06-20 ENCOUNTER — MYC MEDICAL ADVICE (OUTPATIENT)
Dept: GASTROENTEROLOGY | Facility: CLINIC | Age: 29
End: 2024-06-20
Payer: COMMERCIAL

## 2024-06-21 ENCOUNTER — TELEPHONE (OUTPATIENT)
Dept: GASTROENTEROLOGY | Facility: CLINIC | Age: 29
End: 2024-06-21
Payer: COMMERCIAL

## 2024-06-21 NOTE — TELEPHONE ENCOUNTER
LAZAROM for the patient in regards to a reminder call to his upcoming appointment on Monday June 24th at 8:00 AM. Also that he needs to get the fecal elastase submitted as soon as he can in prep for clinic.

## 2024-06-21 NOTE — TELEPHONE ENCOUNTER
----- Message from Indu LEBLANC sent at 6/20/2024 10:31 AM CDT -----  Regarding: Monday 6/24 reminders have been sent, please call this patient for stool test yet to do  Hector Allen,    In prep for Monday clinic with Dr Gallardo, I did all of the reach out for reminders today. I am out of the office tomorrow. If you would please call Duane though and ask him (again) to get the fecal elastase submitted as soon as he can in prep for clinic.     Sarah Beth

## 2024-08-05 ENCOUNTER — TELEPHONE (OUTPATIENT)
Dept: GASTROENTEROLOGY | Facility: CLINIC | Age: 29
End: 2024-08-05
Payer: COMMERCIAL

## 2024-08-05 NOTE — TELEPHONE ENCOUNTER
Left Voicemail (1st Attempt) for the patient to call back and schedule the following:    Appointment type: return  Provider: Dr. Gallardo  Return date: next available   Specialty phone number: [phone]  Additional appointment(s) needed:   Additonal Notes:

## 2024-08-07 ENCOUNTER — TELEPHONE (OUTPATIENT)
Dept: GASTROENTEROLOGY | Facility: CLINIC | Age: 29
End: 2024-08-07
Payer: COMMERCIAL

## 2024-08-07 NOTE — TELEPHONE ENCOUNTER
Patient confirmed scheduled appointment:  Date: 3/3/25  Time: 3:00pm  Visit type: MRI  Provider: From Dr. Gallardo  Location: [place]  Testing/imaging: imaging  Additional notes:

## 2025-03-03 ENCOUNTER — PATIENT OUTREACH (OUTPATIENT)
Dept: GASTROENTEROLOGY | Facility: CLINIC | Age: 30
End: 2025-03-03
Payer: COMMERCIAL

## 2025-03-03 ENCOUNTER — TELEPHONE (OUTPATIENT)
Dept: FAMILY MEDICINE | Facility: CLINIC | Age: 30
End: 2025-03-03
Payer: COMMERCIAL

## 2025-03-03 NOTE — TELEPHONE ENCOUNTER
Patient Quality Outreach    Patient is due for the following:   Depression  -  Depression follow-up visit    Action(s) Taken:   Patient has upcoming appointment, these items will be addressed at that time.    Type of outreach:    Chart review performed, no outreach needed.    Questions for provider review:    None           Eliud Kline MA

## 2025-03-03 NOTE — TELEPHONE ENCOUNTER
Left VM to follow up on mychart communication sent on 2/28. Cancelled CT for 3/3, pending fecal elastase needed in prep for virtual visit 3/10.    Indu Smith, RN, BSN,   [facility]  Care coordinator

## 2025-03-16 ENCOUNTER — HEALTH MAINTENANCE LETTER (OUTPATIENT)
Age: 30
End: 2025-03-16

## (undated) DEVICE — KIT CONNECTOR FOR OLYMPUS ENDOSCOPES DEFENDO 100310

## (undated) DEVICE — SOL NACL 0.9% IRRIG 1000ML BOTTLE 07138-09

## (undated) DEVICE — WIRE GUIDE 0.025"X450CM ANG VISIGLIDE G-240-2545A

## (undated) DEVICE — BOWL 32OZ STERILE 01232

## (undated) DEVICE — ENDO FCP GRASPING ROTATABLE 7.2MMX2.6MM FG-244NR

## (undated) DEVICE — INFLATION DEVICE BIG 60 ENDO-AN6012

## (undated) DEVICE — CLIP HEMOSTATIC ASSURANCE W11 MM 00711882

## (undated) DEVICE — PAD CHUX UNDERPAD 23X24" 7136

## (undated) DEVICE — ENDO CAP AND TUBING STERILE FOR ENDOGATOR  100130

## (undated) DEVICE — SNARE CAPIVATOR II POLYPECTOMY 25X240MM M00561190

## (undated) DEVICE — PACK ENDOSCOPY GI CUSTOM UMMC

## (undated) DEVICE — SOL WATER IRRIG 1000ML BOTTLE 2F7114

## (undated) DEVICE — KIT ENDO FIRST STEP DISINFECTANT 200ML W/POUCH EP-4

## (undated) DEVICE — SUCTION MANIFOLD NEPTUNE 2 SYS 4 PORT 0702-020-000

## (undated) DEVICE — BALLOON ELATION 180CML 14-15-16.5-18-19MM 5.5CM EPX15

## (undated) DEVICE — ENDO PROBE COVER ULTRASOUND BALLOON LATEX  MAJ-249

## (undated) DEVICE — CATH RETRIEVAL BALLOON EXTRACTOR PRO RX-S INJ ABOVE 15-18MM

## (undated) DEVICE — ENDO BITE BLOCK ADULT OMNI-BLOC

## (undated) DEVICE — SNARE CAPIVATOR II POLYPECTOMY 15X240MM M00561230

## (undated) DEVICE — ENDO TUBING CO2 SMARTCAP STERILE DISP 100145CO2EXT

## (undated) RX ORDER — FENTANYL CITRATE 50 UG/ML
INJECTION, SOLUTION INTRAMUSCULAR; INTRAVENOUS
Status: DISPENSED
Start: 2024-04-18

## (undated) RX ORDER — PROPOFOL 10 MG/ML
INJECTION, EMULSION INTRAVENOUS
Status: DISPENSED
Start: 2024-04-18

## (undated) RX ORDER — FENTANYL CITRATE 50 UG/ML
INJECTION, SOLUTION INTRAMUSCULAR; INTRAVENOUS
Status: DISPENSED
Start: 2024-04-11

## (undated) RX ORDER — LIDOCAINE HYDROCHLORIDE 10 MG/ML
INJECTION, SOLUTION INFILTRATION; PERINEURAL
Status: DISPENSED
Start: 2024-03-24

## (undated) RX ORDER — ONDANSETRON 2 MG/ML
INJECTION INTRAMUSCULAR; INTRAVENOUS
Status: DISPENSED
Start: 2024-04-18

## (undated) RX ORDER — FENTANYL CITRATE 50 UG/ML
INJECTION, SOLUTION INTRAMUSCULAR; INTRAVENOUS
Status: DISPENSED
Start: 2024-03-24

## (undated) RX ORDER — WATER 10 ML/10ML
INJECTION INTRAMUSCULAR; INTRAVENOUS; SUBCUTANEOUS
Status: DISPENSED
Start: 2024-04-18

## (undated) RX ORDER — DEXAMETHASONE SODIUM PHOSPHATE 4 MG/ML
INJECTION, SOLUTION INTRA-ARTICULAR; INTRALESIONAL; INTRAMUSCULAR; INTRAVENOUS; SOFT TISSUE
Status: DISPENSED
Start: 2024-04-18

## (undated) RX ORDER — FENTANYL CITRATE-0.9 % NACL/PF 10 MCG/ML
PLASTIC BAG, INJECTION (ML) INTRAVENOUS
Status: DISPENSED
Start: 2024-04-18

## (undated) RX ORDER — HEPARIN SODIUM 200 [USP'U]/100ML
INJECTION, SOLUTION INTRAVENOUS
Status: DISPENSED
Start: 2024-03-24